# Patient Record
Sex: FEMALE | Race: WHITE | NOT HISPANIC OR LATINO | Employment: OTHER | URBAN - NONMETROPOLITAN AREA
[De-identification: names, ages, dates, MRNs, and addresses within clinical notes are randomized per-mention and may not be internally consistent; named-entity substitution may affect disease eponyms.]

---

## 2017-11-13 ENCOUNTER — TRANSCRIBE ORDERS (OUTPATIENT)
Dept: ADMINISTRATIVE | Facility: HOSPITAL | Age: 57
End: 2017-11-13

## 2017-11-13 DIAGNOSIS — E11.9 DIABETES MELLITUS WITHOUT COMPLICATION (HCC): Primary | ICD-10-CM

## 2021-02-25 DIAGNOSIS — Z23 IMMUNIZATION DUE: ICD-10-CM

## 2021-03-08 ENCOUNTER — IMMUNIZATION (OUTPATIENT)
Dept: VACCINE CLINIC | Facility: HOSPITAL | Age: 61
End: 2021-03-08

## 2021-03-08 PROCEDURE — 91300 HC SARSCOV02 VAC 30MCG/0.3ML IM: CPT | Performed by: INTERNAL MEDICINE

## 2021-03-08 PROCEDURE — 0001A: CPT | Performed by: INTERNAL MEDICINE

## 2021-03-29 ENCOUNTER — IMMUNIZATION (OUTPATIENT)
Dept: VACCINE CLINIC | Facility: HOSPITAL | Age: 61
End: 2021-03-29

## 2021-03-29 PROCEDURE — 0002A: CPT | Performed by: INTERNAL MEDICINE

## 2021-03-29 PROCEDURE — 91300 HC SARSCOV02 VAC 30MCG/0.3ML IM: CPT | Performed by: INTERNAL MEDICINE

## 2021-08-06 LAB — DIABETIC RETINOPATHY: NEGATIVE

## 2022-04-06 ENCOUNTER — OFFICE VISIT (OUTPATIENT)
Dept: INTERNAL MEDICINE CLINIC | Age: 62
End: 2022-04-06
Payer: MEDICAID

## 2022-04-06 VITALS
HEART RATE: 88 BPM | DIASTOLIC BLOOD PRESSURE: 64 MMHG | SYSTOLIC BLOOD PRESSURE: 132 MMHG | WEIGHT: 171.2 LBS | OXYGEN SATURATION: 98 % | BODY MASS INDEX: 27.51 KG/M2 | HEIGHT: 66 IN

## 2022-04-06 DIAGNOSIS — Z12.11 SCREEN FOR COLON CANCER: ICD-10-CM

## 2022-04-06 DIAGNOSIS — K75.81 NASH (NONALCOHOLIC STEATOHEPATITIS): ICD-10-CM

## 2022-04-06 DIAGNOSIS — E78.49 OTHER HYPERLIPIDEMIA: ICD-10-CM

## 2022-04-06 DIAGNOSIS — Z76.89 ENCOUNTER TO ESTABLISH CARE: Primary | ICD-10-CM

## 2022-04-06 DIAGNOSIS — I10 ESSENTIAL HYPERTENSION: ICD-10-CM

## 2022-04-06 DIAGNOSIS — G25.81 RESTLESS LEG SYNDROME: ICD-10-CM

## 2022-04-06 DIAGNOSIS — I25.9 CHRONIC ISCHEMIC HEART DISEASE: ICD-10-CM

## 2022-04-06 DIAGNOSIS — F41.9 ANXIETY AND DEPRESSION: ICD-10-CM

## 2022-04-06 DIAGNOSIS — K21.9 GASTROESOPHAGEAL REFLUX DISEASE WITHOUT ESOPHAGITIS: ICD-10-CM

## 2022-04-06 DIAGNOSIS — E11.42 DIABETIC POLYNEUROPATHY ASSOCIATED WITH TYPE 2 DIABETES MELLITUS (HCC): ICD-10-CM

## 2022-04-06 DIAGNOSIS — F32.A ANXIETY AND DEPRESSION: ICD-10-CM

## 2022-04-06 DIAGNOSIS — E21.3 HYPERPARATHYROIDISM (HCC): ICD-10-CM

## 2022-04-06 DIAGNOSIS — E11.8 TYPE 2 DIABETES MELLITUS WITH COMPLICATIONS (HCC): ICD-10-CM

## 2022-04-06 DIAGNOSIS — C95.11 CHRONIC LEUKEMIA IN REMISSION (HCC): ICD-10-CM

## 2022-04-06 DIAGNOSIS — Z11.59 SCREENING FOR VIRAL DISEASE: ICD-10-CM

## 2022-04-06 DIAGNOSIS — E03.8 OTHER SPECIFIED HYPOTHYROIDISM: ICD-10-CM

## 2022-04-06 DIAGNOSIS — G47.30 SLEEP APNEA, UNSPECIFIED TYPE: ICD-10-CM

## 2022-04-06 DIAGNOSIS — E53.8 B12 DEFICIENCY: ICD-10-CM

## 2022-04-06 DIAGNOSIS — Z12.31 ENCOUNTER FOR SCREENING MAMMOGRAM FOR MALIGNANT NEOPLASM OF BREAST: ICD-10-CM

## 2022-04-06 DIAGNOSIS — J45.30 MILD PERSISTENT ASTHMA WITHOUT COMPLICATION: ICD-10-CM

## 2022-04-06 PROBLEM — J45.20 MILD INTERMITTENT ASTHMA WITHOUT COMPLICATION: Status: ACTIVE | Noted: 2022-04-06

## 2022-04-06 PROBLEM — R26.89 BALANCE DISORDER: Status: ACTIVE | Noted: 2022-04-06

## 2022-04-06 PROBLEM — E03.9 HYPOTHYROIDISM: Status: ACTIVE | Noted: 2022-04-06

## 2022-04-06 PROBLEM — C95.90 LEUKEMIA (HCC): Status: ACTIVE | Noted: 2022-04-06

## 2022-04-06 PROCEDURE — 1036F TOBACCO NON-USER: CPT | Performed by: INTERNAL MEDICINE

## 2022-04-06 PROCEDURE — G8427 DOCREV CUR MEDS BY ELIG CLIN: HCPCS | Performed by: INTERNAL MEDICINE

## 2022-04-06 PROCEDURE — 3046F HEMOGLOBIN A1C LEVEL >9.0%: CPT | Performed by: INTERNAL MEDICINE

## 2022-04-06 PROCEDURE — G8419 CALC BMI OUT NRM PARAM NOF/U: HCPCS | Performed by: INTERNAL MEDICINE

## 2022-04-06 PROCEDURE — 3017F COLORECTAL CA SCREEN DOC REV: CPT | Performed by: INTERNAL MEDICINE

## 2022-04-06 PROCEDURE — 2022F DILAT RTA XM EVC RTNOPTHY: CPT | Performed by: INTERNAL MEDICINE

## 2022-04-06 PROCEDURE — 99205 OFFICE O/P NEW HI 60 MIN: CPT | Performed by: INTERNAL MEDICINE

## 2022-04-06 RX ORDER — LEVOTHYROXINE SODIUM 0.05 MG/1
TABLET ORAL
Qty: 90 TABLET | Refills: 1 | Status: SHIPPED | OUTPATIENT
Start: 2022-04-06 | End: 2022-06-17 | Stop reason: SDUPTHER

## 2022-04-06 RX ORDER — B-COMPLEX WITH VITAMIN C
1 TABLET ORAL DAILY
COMMUNITY

## 2022-04-06 RX ORDER — INSULIN LISPRO 100 [IU]/ML
INJECTION, SOLUTION INTRAVENOUS; SUBCUTANEOUS
Qty: 10 PEN | Refills: 1 | Status: SHIPPED
Start: 2022-04-06 | End: 2022-04-06

## 2022-04-06 RX ORDER — INSULIN LISPRO 100 [IU]/ML
INJECTION, SOLUTION INTRAVENOUS; SUBCUTANEOUS
COMMUNITY
Start: 2021-08-31 | End: 2022-04-06

## 2022-04-06 RX ORDER — LEVOTHYROXINE SODIUM 0.05 MG/1
TABLET ORAL
COMMUNITY
Start: 2021-12-22 | End: 2022-04-06 | Stop reason: SDUPTHER

## 2022-04-06 RX ORDER — METHOCARBAMOL 500 MG/1
500 TABLET, FILM COATED ORAL 3 TIMES DAILY PRN
COMMUNITY
Start: 2021-11-17 | End: 2022-09-07

## 2022-04-06 RX ORDER — NORTRIPTYLINE HYDROCHLORIDE 25 MG/1
25 CAPSULE ORAL NIGHTLY
COMMUNITY
Start: 2022-01-19 | End: 2022-04-06

## 2022-04-06 RX ORDER — EMPAGLIFLOZIN 25 MG/1
25 TABLET, FILM COATED ORAL DAILY
COMMUNITY
Start: 2022-02-15 | End: 2022-04-06

## 2022-04-06 RX ORDER — PANTOPRAZOLE SODIUM 40 MG/1
40 TABLET, DELAYED RELEASE ORAL DAILY
Qty: 90 TABLET | Refills: 1 | Status: SHIPPED | OUTPATIENT
Start: 2022-04-06 | End: 2022-07-05

## 2022-04-06 RX ORDER — ASPIRIN 81 MG/1
81 TABLET ORAL DAILY
COMMUNITY

## 2022-04-06 RX ORDER — ALBUTEROL SULFATE 90 UG/1
2 AEROSOL, METERED RESPIRATORY (INHALATION) EVERY 6 HOURS PRN
COMMUNITY
End: 2022-04-06 | Stop reason: SDUPTHER

## 2022-04-06 RX ORDER — DULOXETIN HYDROCHLORIDE 60 MG/1
60 CAPSULE, DELAYED RELEASE ORAL DAILY
Qty: 90 CAPSULE | Refills: 1 | Status: SHIPPED | OUTPATIENT
Start: 2022-04-06 | End: 2022-07-06

## 2022-04-06 RX ORDER — ISOSORBIDE MONONITRATE 30 MG/1
15 TABLET, EXTENDED RELEASE ORAL DAILY
COMMUNITY
Start: 2021-10-27 | End: 2022-04-06 | Stop reason: SDUPTHER

## 2022-04-06 RX ORDER — ATORVASTATIN CALCIUM 40 MG/1
40 TABLET, FILM COATED ORAL DAILY
COMMUNITY
Start: 2022-03-15 | End: 2022-04-06 | Stop reason: SDUPTHER

## 2022-04-06 RX ORDER — BLOOD-GLUCOSE METER
KIT MISCELLANEOUS
COMMUNITY
End: 2022-04-27 | Stop reason: SDUPTHER

## 2022-04-06 RX ORDER — CLOPIDOGREL BISULFATE 75 MG/1
75 TABLET ORAL DAILY
Qty: 90 TABLET | Refills: 1 | Status: SHIPPED | OUTPATIENT
Start: 2022-04-06 | End: 2022-05-05 | Stop reason: ALTCHOICE

## 2022-04-06 RX ORDER — CLOPIDOGREL BISULFATE 75 MG/1
75 TABLET ORAL DAILY
COMMUNITY
Start: 2021-12-22 | End: 2022-04-06 | Stop reason: SDUPTHER

## 2022-04-06 RX ORDER — INSULIN GLARGINE 100 [IU]/ML
35 INJECTION, SOLUTION SUBCUTANEOUS NIGHTLY
Qty: 5 PEN | Refills: 0
Start: 2022-04-06 | End: 2022-04-07

## 2022-04-06 RX ORDER — ALBUTEROL SULFATE 90 UG/1
2 AEROSOL, METERED RESPIRATORY (INHALATION) EVERY 6 HOURS PRN
Qty: 18 G | Refills: 5 | Status: SHIPPED | OUTPATIENT
Start: 2022-04-06

## 2022-04-06 RX ORDER — ISOSORBIDE MONONITRATE 30 MG/1
15 TABLET, EXTENDED RELEASE ORAL DAILY
Qty: 45 TABLET | Refills: 1 | Status: SHIPPED | OUTPATIENT
Start: 2022-04-06 | End: 2022-04-11

## 2022-04-06 RX ORDER — INSULIN LISPRO 100 [IU]/ML
INJECTION, SOLUTION INTRAVENOUS; SUBCUTANEOUS
Qty: 10 PEN | Refills: 1
Start: 2022-04-06

## 2022-04-06 RX ORDER — INSULIN GLARGINE 100 [IU]/ML
35 INJECTION, SOLUTION SUBCUTANEOUS
COMMUNITY
Start: 2022-01-31 | End: 2022-04-06 | Stop reason: SDUPTHER

## 2022-04-06 RX ORDER — GABAPENTIN 800 MG/1
800 TABLET ORAL 2 TIMES DAILY PRN
COMMUNITY
Start: 2022-01-19 | End: 2022-04-06 | Stop reason: SDUPTHER

## 2022-04-06 RX ORDER — ATORVASTATIN CALCIUM 40 MG/1
40 TABLET, FILM COATED ORAL DAILY
Qty: 90 TABLET | Refills: 1 | Status: SHIPPED | OUTPATIENT
Start: 2022-04-06 | End: 2022-07-05

## 2022-04-06 RX ORDER — MULTIVIT WITH MINERALS/LUTEIN
500 TABLET ORAL DAILY
COMMUNITY

## 2022-04-06 RX ORDER — CETIRIZINE HYDROCHLORIDE 10 MG/1
10 TABLET ORAL DAILY
COMMUNITY
Start: 2022-04-06

## 2022-04-06 RX ORDER — DULOXETIN HYDROCHLORIDE 60 MG/1
60 CAPSULE, DELAYED RELEASE ORAL DAILY
COMMUNITY
Start: 2022-02-01 | End: 2022-04-06 | Stop reason: SDUPTHER

## 2022-04-06 RX ORDER — GABAPENTIN 800 MG/1
800 TABLET ORAL 2 TIMES DAILY
Qty: 180 TABLET | Refills: 1 | Status: SHIPPED | OUTPATIENT
Start: 2022-04-06 | End: 2022-07-05

## 2022-04-06 RX ORDER — PANTOPRAZOLE SODIUM 40 MG/1
40 TABLET, DELAYED RELEASE ORAL DAILY
COMMUNITY
Start: 2021-11-16 | End: 2022-04-06 | Stop reason: SDUPTHER

## 2022-04-06 SDOH — ECONOMIC STABILITY: FOOD INSECURITY: WITHIN THE PAST 12 MONTHS, YOU WORRIED THAT YOUR FOOD WOULD RUN OUT BEFORE YOU GOT MONEY TO BUY MORE.: NEVER TRUE

## 2022-04-06 SDOH — ECONOMIC STABILITY: FOOD INSECURITY: WITHIN THE PAST 12 MONTHS, THE FOOD YOU BOUGHT JUST DIDN'T LAST AND YOU DIDN'T HAVE MONEY TO GET MORE.: NEVER TRUE

## 2022-04-06 ASSESSMENT — ENCOUNTER SYMPTOMS
ABDOMINAL PAIN: 1
VOICE CHANGE: 1
CONSTIPATION: 1
COUGH: 1
TROUBLE SWALLOWING: 1
WHEEZING: 1
SORE THROAT: 1
ROS SKIN COMMENTS: ITCHING
DIARRHEA: 1
SHORTNESS OF BREATH: 1

## 2022-04-06 ASSESSMENT — PATIENT HEALTH QUESTIONNAIRE - PHQ9
DEPRESSION UNABLE TO ASSESS: PT REFUSES
8. MOVING OR SPEAKING SO SLOWLY THAT OTHER PEOPLE COULD HAVE NOTICED. OR THE OPPOSITE, BEING SO FIGETY OR RESTLESS THAT YOU HAVE BEEN MOVING AROUND A LOT MORE THAN USUAL: 0
10. IF YOU CHECKED OFF ANY PROBLEMS, HOW DIFFICULT HAVE THESE PROBLEMS MADE IT FOR YOU TO DO YOUR WORK, TAKE CARE OF THINGS AT HOME, OR GET ALONG WITH OTHER PEOPLE: 1
7. TROUBLE CONCENTRATING ON THINGS, SUCH AS READING THE NEWSPAPER OR WATCHING TELEVISION: 3
SUM OF ALL RESPONSES TO PHQ QUESTIONS 1-9: 14
9. THOUGHTS THAT YOU WOULD BE BETTER OFF DEAD, OR OF HURTING YOURSELF: 0
1. LITTLE INTEREST OR PLEASURE IN DOING THINGS: 3
4. FEELING TIRED OR HAVING LITTLE ENERGY: 3
SUM OF ALL RESPONSES TO PHQ QUESTIONS 1-9: 14
3. TROUBLE FALLING OR STAYING ASLEEP: 3
2. FEELING DOWN, DEPRESSED OR HOPELESS: 1
SUM OF ALL RESPONSES TO PHQ QUESTIONS 1-9: 14
SUM OF ALL RESPONSES TO PHQ QUESTIONS 1-9: 14
6. FEELING BAD ABOUT YOURSELF - OR THAT YOU ARE A FAILURE OR HAVE LET YOURSELF OR YOUR FAMILY DOWN: 0
SUM OF ALL RESPONSES TO PHQ9 QUESTIONS 1 & 2: 4
5. POOR APPETITE OR OVEREATING: 1

## 2022-04-06 ASSESSMENT — SOCIAL DETERMINANTS OF HEALTH (SDOH): HOW HARD IS IT FOR YOU TO PAY FOR THE VERY BASICS LIKE FOOD, HOUSING, MEDICAL CARE, AND HEATING?: NOT HARD AT ALL

## 2022-04-06 NOTE — PROGRESS NOTES
Osman Mitchell (:  1960) is a 60 y.o. female, here for evaluation of the following chief complaint(s):    New Patient      ASSESSMENT/PLAN:  1. Encounter to establish care  Chart extensively reviewed and updated  2. Chronic ischemic heart disease  -     Referral to: Francy Saavedra MD, Cardiology, [facility]  -     clopidogrel (PLAVIX) 75 MG tablet; Take 1 tablet by mouth daily, Disp-90 tablet, R-1Normal  -     isosorbide mononitrate (IMDUR) 30 MG extended release tablet; Take 0.5 tablets by mouth daily, Disp-45 tablet, R-1Normal    3. CORONA (nonalcoholic steatohepatitis)  -     [facility]  4. Gastroesophageal reflux disease without esophagitis  -     pantoprazole (PROTONIX) 40 MG tablet; Take 1 tablet by mouth daily, Disp-90 tablet, R-1Normal  -     Magnesium; Future  -     [facility]  5. Type 2 diabetes mellitus with complications [facility])  -     Lynne Vences MD, Endocrinology, [facility]  -     insulin glargine (LANTUS SOLOSTAR) 100 UNIT/ML injection pen; Inject 35 Units into the skin nightly, Disp-5 pen, R-0Adjust Sig  -     Hemoglobin A1C; Future  -     insulin lispro, 1 Unit Dial, (HUMALOG KWIKPEN) 100 UNIT/ML SOPN; Use up  To 35 units daily per sliding scale, Disp-10 pen, R-1NO PRINT  -Metformin  6. Other specified hypothyroidism  -     levothyroxine (SYNTHROID) 50 MCG tablet; TAKE ONE TABLET BY MOUTH ON MONDAY THROUGH FRIDAY AND 2 TABLETS BY MOUTH ON SATURDAY AND , Disp-90 tablet, R-1Normal  -     TSH; Future  -     T4, Free; Future  7. Diabetic polyneuropathy associated with type 2 diabetes mellitus (HCC)   Stable on gabapentin  8. Other hyperlipidemia  -     atorvastatin (LIPITOR) 40 MG tablet; Take 1 tablet by mouth daily, Disp-90 tablet, R-1Normal    9. Chronic leukemia in remission ([address]  -     CBC; Future  10. B12 deficiency  -     Vitamin B12; Future  11.  Anxiety and depression   Patient feels her symptoms are not well controlled. Referral to Dr. Mari Hernandez  -     DULoxetine (CYMBALTA) 60 MG extended release capsule; Take 1 capsule by mouth daily, Disp-90 capsule, R-1Normal  12. Mild persistent asthma without complication  -     albuterol sulfate  (90 Base) MCG/ACT inhaler; Inhale 2 puffs into the lungs every 6 hours as needed for Wheezing, Disp-18 g, R-5Normal  -     cetirizine (ZYRTEC ALLERGY) 10 MG tablet; Take 1 tablet by mouth dailyOTC  13. Essential hypertension  Reasonable control on metoprolol. Consider ACE or ARB in the future  -     metoprolol tartrate (LOPRESSOR) 25 MG tablet; Take 0.5 tablets by mouth 2 times daily, Disp-90 tablet, R-1Normal  -     Comprehensive Metabolic Panel; Future  14. Hyperparathyroidism ([facility]  -     PTH, Intact; Future  -     Vitamin D 25 Hydroxy; Future  15. Restless leg syndrome  May relate to her SNRI. Continue gabapentin  -     Iron and TIBC; Future  16. Encounter for screening mammogram for malignant neoplasm of breast  -     [facility] DIGITAL SCREEN W OR WO CAD BILATERAL; Future  17. Screening for viral disease  -     Hepatitis C Antibody; Future  -     HIV Screen; Future  18. Screen for colon cancer  -     AFL - Rachel Keys MD, Gastroenterology, [place]  19. Sleep apnea, unspecified type  -     [facility] Sleep Medicine      Return in about 4 weeks (around 5/4/2022). SUBJECTIVE/OBJECTIVE:  HPI     Patient is here to establish care. Chart is reviewed and updated. She feels her antidepressants are not working well enough for her. 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 She states that she has had neuropsychology evaluations at [facility]. She is interested in seeing a psychiatrist to further assess. Regarding her diabetes, she has been off Jardiance for few weeks due to cost.  She was seeing an endocrinologist at [facility].   She would like a referral in the [facility] system. She states they were monitoring her parathyroid. She states that she needs a referral to have her CPAP or BiPAP checked. Review of Systems   Constitutional: Positive for appetite change, fatigue and unexpected weight change. Snoring   HENT: Positive for hearing loss, sore throat, tinnitus, trouble swallowing and voice change. Eyes: Positive for visual disturbance. Respiratory: Positive for cough, shortness of breath and wheezing. Cardiovascular: Positive for chest pain, palpitations and leg swelling. Gastrointestinal: Positive for abdominal pain, constipation and diarrhea. Genitourinary: Positive for frequency. Musculoskeletal: Positive for arthralgias and myalgias. Skin:        itching   Neurological: Positive for headaches. Psychiatric/Behavioral: Positive for decreased concentration, dysphoric mood and sleep disturbance. Past Medical History:   Diagnosis Date    Anxiety and depression     AV block, 1st degree     B12 deficiency     Balance disorder     frequent falls    Chronic ischemic heart disease     sp stents-was at [facility]-dr davalos?     Diabetic polyneuropathy associated with type 2 diabetes mellitus ([address]     Essential (primary) hypertension     GERD (gastroesophageal reflux disease)     Hyperparathyroidism ([address]     Hypothyroidism     Leukemia ([address]     acute lymphoblastic leukemia- [facility]    Low back pain     Mild intermittent asthma without complication     cold induced    Muscle spasms of neck     CORONA (nonalcoholic steatohepatitis)     saw dr Dinah Cooks    Osteoarthritis     hands knees, hips    Osteopenia     Other hyperlipidemia     PSVT (paroxysmal supraventricular tachycardia) (HCC)     Restless leg syndrome     Sleep apnea     Subarachnoid hemorrhage ([address] 2003    Type 2 diabetes mellitus with complications (HCC)        Current Outpatient Medications   Medication Sig Dispense Refill    Ascorbic Acid (VITAMIN C) 1000 MG tablet       aspirin 81 MG EC tablet Take 81 mg by mouth daily      Calcium Carbonate-Vitamin D (OYSTER SHELL CALCIUM/D) 500-200 MG-UNIT TABS Take 1 tablet by mouth 2 times daily (with meals)      Cholecalciferol (VITAMIN D3) 125 MCG (5000 UT) CAPS       cyanocobalamin 250 MCG tablet Take 250 mcg by mouth daily      blood glucose test strips (FREESTYLE LITE) strip       Insulin Pen Needle 32G X 5 MM MISC       Melatonin 10 MG SUBL       methocarbamol (ROBAXIN) 500 MG tablet Take 500 mg by mouth 3 times daily as needed      albuterol sulfate  (90 Base) MCG/ACT inhaler Inhale 2 puffs into the lungs every 6 hours as needed for Wheezing 18 g 5    atorvastatin (LIPITOR) 40 MG tablet Take 1 tablet by mouth daily 90 tablet 1    clopidogrel (PLAVIX) 75 MG tablet Take 1 tablet by mouth daily 90 tablet 1    DULoxetine (CYMBALTA) 60 MG extended release capsule Take 1 capsule by mouth daily 90 capsule 1    gabapentin (NEURONTIN) 800 MG tablet Take 1 tablet by mouth 2 times daily for 90 days.  180 tablet 1    insulin glargine (LANTUS SOLOSTAR) 100 UNIT/ML injection pen Inject 35 Units into the skin nightly 5 pen 0    isosorbide mononitrate (IMDUR) 30 MG extended release tablet Take 0.5 tablets by mouth daily 45 tablet 1    levothyroxine (SYNTHROID) 50 MCG tablet TAKE ONE TABLET BY MOUTH ON MONDAY THROUGH FRIDAY AND 2 TABLETS BY MOUTH ON SATURDAY AND SUNDAY 90 tablet 1    metFORMIN (GLUCOPHAGE) 500 MG tablet Take 2 tablets by mouth 2 times daily 360 tablet 1    metoprolol tartrate (LOPRESSOR) 25 MG tablet Take 0.5 tablets by mouth 2 times daily 90 tablet 1    pantoprazole (PROTONIX) 40 MG tablet Take 1 tablet by mouth daily 90 tablet 1    insulin lispro, 1 Unit Dial, (HUMALOG KWIKPEN) 100 UNIT/ML SOPN Use up  To 35 units daily per sliding scale 10 pen 1    cetirizine (ZYRTEC ALLERGY) 10 MG tablet Take 1 tablet by mouth daily       No current facility-administered medications for this visit.    tylenol, ibuprofen, occ benardyl for itchy dry skin    Physical Exam  Vitals and nursing note reviewed. Constitutional:       General: She is not in acute distress. Appearance: She is well-developed. HENT:      Head: Normocephalic and atraumatic. Right Ear: External ear normal.      Left Ear: External ear normal.   Eyes:      General: No scleral icterus. Extraocular Movements: Extraocular movements intact. Neck:      Thyroid: No thyromegaly. Cardiovascular:      Rate and Rhythm: Normal rate and regular rhythm. Heart sounds: No murmur heard. Pulmonary:      Effort: No respiratory distress. Breath sounds: Normal breath sounds. No wheezing or rales. Abdominal:      General: Bowel sounds are normal. There is no distension. Palpations: Abdomen is soft. Tenderness: There is no abdominal tenderness. Musculoskeletal:         General: Normal range of motion. Right lower leg: No edema. Left lower leg: No edema. Lymphadenopathy:      Cervical: No cervical adenopathy. Skin:     General: Skin is warm and dry. Neurological:      Mental Status: She is alert and oriented to person, place, and time. Cranial Nerves: No cranial nerve deficit. Sensory: No sensory deficit. Coordination: Coordination normal.   Psychiatric:         Behavior: Behavior normal.         On this date I have spent 65 minutes reviewing previous notes, test results and face to face with the patient discussing the diagnosis and importance of compliance with the treatment plan as well as documenting on the day of the visit. This note was generated completely or in part utilizing Dragon dictation speech recognition software. Occasionally, words are mistranscribed and despite editing, the text may contain inaccuracies due to incorrect word recognition.   If further clarification is needed please contact the office at [phone]          An electronic signature was used to

## 2022-04-07 RX ORDER — MAGNESIUM OXIDE 400 MG/1
400 TABLET ORAL DAILY
Qty: 90 TABLET | Refills: 1 | Status: ON HOLD | OUTPATIENT
Start: 2022-04-07 | End: 2022-05-27

## 2022-04-07 RX ORDER — INSULIN GLARGINE 100 [IU]/ML
38 INJECTION, SOLUTION SUBCUTANEOUS NIGHTLY
Qty: 5 PEN | Refills: 0
Start: 2022-04-07 | End: 2022-08-19

## 2022-04-10 DIAGNOSIS — I49.8 OTHER CARDIAC ARRHYTHMIA: Primary | ICD-10-CM

## 2022-04-11 ENCOUNTER — TELEPHONE (OUTPATIENT)
Dept: INTERNAL MEDICINE CLINIC | Age: 62
End: 2022-04-11

## 2022-04-11 DIAGNOSIS — I25.9 CHRONIC ISCHEMIC HEART DISEASE: ICD-10-CM

## 2022-04-11 RX ORDER — ISOSORBIDE MONONITRATE 30 MG/1
30 TABLET, EXTENDED RELEASE ORAL DAILY
Qty: 90 TABLET | Refills: 1
Start: 2022-04-11 | End: 2022-04-13 | Stop reason: SDUPTHER

## 2022-04-12 ENCOUNTER — OFFICE VISIT (OUTPATIENT)
Dept: SLEEP MEDICINE | Age: 62
End: 2022-04-12
Payer: MEDICAID

## 2022-04-12 VITALS
WEIGHT: 170 LBS | TEMPERATURE: 96.6 F | OXYGEN SATURATION: 98 % | BODY MASS INDEX: 27.32 KG/M2 | SYSTOLIC BLOOD PRESSURE: 110 MMHG | DIASTOLIC BLOOD PRESSURE: 60 MMHG | HEIGHT: 66 IN | HEART RATE: 73 BPM | RESPIRATION RATE: 18 BRPM

## 2022-04-12 DIAGNOSIS — R53.83 FATIGUE, UNSPECIFIED TYPE: ICD-10-CM

## 2022-04-12 DIAGNOSIS — G47.33 OSA (OBSTRUCTIVE SLEEP APNEA): Primary | ICD-10-CM

## 2022-04-12 DIAGNOSIS — R06.83 SNORING: ICD-10-CM

## 2022-04-12 DIAGNOSIS — Z86.79 H/O ISCHEMIC HEART DISEASE: ICD-10-CM

## 2022-04-12 DIAGNOSIS — G47.19 EXCESSIVE DAYTIME SLEEPINESS: ICD-10-CM

## 2022-04-12 DIAGNOSIS — R06.89 GASPING FOR BREATH: ICD-10-CM

## 2022-04-12 DIAGNOSIS — G47.30 OBSERVED SLEEP APNEA: ICD-10-CM

## 2022-04-12 PROCEDURE — 1036F TOBACCO NON-USER: CPT | Performed by: PSYCHIATRY & NEUROLOGY

## 2022-04-12 PROCEDURE — G8427 DOCREV CUR MEDS BY ELIG CLIN: HCPCS | Performed by: PSYCHIATRY & NEUROLOGY

## 2022-04-12 PROCEDURE — 3017F COLORECTAL CA SCREEN DOC REV: CPT | Performed by: PSYCHIATRY & NEUROLOGY

## 2022-04-12 PROCEDURE — 99204 OFFICE O/P NEW MOD 45 MIN: CPT | Performed by: PSYCHIATRY & NEUROLOGY

## 2022-04-12 PROCEDURE — G8419 CALC BMI OUT NRM PARAM NOF/U: HCPCS | Performed by: PSYCHIATRY & NEUROLOGY

## 2022-04-12 ASSESSMENT — SLEEP AND FATIGUE QUESTIONNAIRES
ESS TOTAL SCORE: 8
HOW LIKELY ARE YOU TO NOD OFF OR FALL ASLEEP WHILE SITTING AND READING: 2
HOW LIKELY ARE YOU TO NOD OFF OR FALL ASLEEP WHILE WATCHING TV: 2
NECK CIRCUMFERENCE (INCHES): 14.5
HOW LIKELY ARE YOU TO NOD OFF OR FALL ASLEEP WHILE SITTING AND TALKING TO SOMEONE: 0
HOW LIKELY ARE YOU TO NOD OFF OR FALL ASLEEP WHILE SITTING INACTIVE IN A PUBLIC PLACE: 1
HOW LIKELY ARE YOU TO NOD OFF OR FALL ASLEEP WHILE SITTING QUIETLY AFTER LUNCH WITHOUT ALCOHOL: 1
HOW LIKELY ARE YOU TO NOD OFF OR FALL ASLEEP WHEN YOU ARE A PASSENGER IN A CAR FOR AN HOUR WITHOUT A BREAK: 1
HOW LIKELY ARE YOU TO NOD OFF OR FALL ASLEEP IN A CAR, WHILE STOPPED FOR A FEW MINUTES IN TRAFFIC: 0
HOW LIKELY ARE YOU TO NOD OFF OR FALL ASLEEP WHILE LYING DOWN TO REST IN THE AFTERNOON WHEN CIRCUMSTANCES PERMIT: 1

## 2022-04-12 ASSESSMENT — ENCOUNTER SYMPTOMS
CHOKING: 1
GASTROINTESTINAL NEGATIVE: 1
SHORTNESS OF BREATH: 1
WHEEZING: 1
APNEA: 1
SORE THROAT: 1

## 2022-04-12 NOTE — PROGRESS NOTES
MD BARBARA Benavides Board Certified in Sleep Medicine  Certified [facility] Sleep Medicine  Board Certified in Neurology [address]  [address] [place] ([address]  [address], [address]           [address]  [facility] SLEEP MEDICINE [address] [address] [phone]    Subjective:     Patient ID: Shanice Sales is a 60 y.o. female. Chief Complaint   Patient presents with    [facility]    Sleep Apnea       HPI:        Shanice Sales is a 60 y.o. female referred by Dr. Funmilayo Millan for a sleep evaluation. She complains of snoring, snorting, choking, periods of not breathing, tossing and turning, decreased concentration, excessive daytime sleepiness, feels sleepy during the day but she denies knees buckling with laughing, completely or partially paralyzed while falling asleep or waking up, noisy environment, uncomfortable room temperature, uncomfortable bedding. Symptoms began several years ago, gradually worsening since that time. The patient's bed-partner confirmed the snoring and stopped breathing at night  SLEEP SCHEDULE: Goes to bed around 11 PM-12 AM in the weekdays and 11 PM-12 AM in the weekends. It usually takes the patient 120 minutes to fall asleep. The patient gets up 1-2 per night to go to the bathroom. The Patient finally gets up at 9-10 AM during the weekdays and 9-10 AM in the weekends. patient wakes up with dry mouth and sometimes morning headache. . the headache usually dull headache. .   The patient has restless sleep with frequent arousals in addition to the Patient has significant daytime sleepiness. The Patient scored Total score: 8 on Jasper Sleepiness Scale ( more than 10 is indicative of daytime sleepiness)and 57 in fatigue scale ( more than 36 is indicative of daytime fatigue). The patient takes no naps. Previous evaluation and treatment has included- PSG and PSG with C PAP titration. 40 and 17 years ago. DOT/CDL - N/A  LIUDMILA/Erlin - N/A  The patient CAD is stable. Previous Report(s) Reviewed: historical medical records       Social History     Socioeconomic History    Marital status:      Spouse name: Not on file    Number of children: 2    Years of education: Not on file    Highest education level: Not on file   Occupational History    Occupation: retail work, retired nurse   Tobacco Use    Smoking status: Never Smoker    Smokeless tobacco: Never Used   Substance and Sexual Activity    Alcohol use: Never    Drug use: Never    Sexual activity: Not on file   Other Topics Concern    Not on file   Social History Narrative    Lives w daughter    Has 2 daughters 44 and 45 4/22     Social Determinants of Health     Financial Resource Strain: Low Risk     Difficulty of Paying Living Expenses: Not hard at all   Food Insecurity: No Food Insecurity    Worried About Running Out of Food in the Last Year: Never true    Bill of Food in the Last Year: Never true   Transportation Needs:     Lack of Transportation (Medical): Not on file    Lack of Transportation (Non-Medical):  Not on file   Physical Activity:     Days of Exercise per Week: Not on file    Minutes of Exercise per Session: Not on file   Stress:     Feeling of Stress : Not on file   Social Connections:     Frequency of Communication with Friends and Family: Not on file    Frequency of Social Gatherings with Friends and Family: Not on file    Attends [employer] Services: Not on file    Active Member of Clubs or Organizations: Not on file    Attends Club or Organization Meetings: Not on file    Marital Status: Not on file   Intimate Partner Violence:     Fear of Current or Ex-Partner: Not on file    Emotionally Abused: Not on file    Physically Abused: Not on file    Sexually Abused: Not on file   Housing Stability:     Unable to Pay for Housing in the Last Year: Not on file    Number of Places Lived in the Last Year: Not on file    Unstable Housing in the Last Year: Not on file       Prior to Admission medications    Medication Sig Start Date End Date Taking?  Authorizing Provider   isosorbide mononitrate (IMDUR) 30 MG extended release tablet Take 1 tablet by mouth daily 4/11/22  Yes Ru Pryor MD   magnesium oxide (MAG-OX) 400 MG tablet Take 1 tablet by mouth daily 4/7/22  Yes Ru Pryor MD   insulin glargine (LANTUS SOLOSTAR) 100 UNIT/ML injection pen Inject 38 Units into the skin nightly 4/7/22  Yes Ana Luisa Falcon MD   Ascorbic Acid (VITAMIN C) 1000 MG tablet    Yes Historical Provider, MD   aspirin 81 MG EC tablet Take 81 mg by mouth daily   Yes Historical Provider, MD   Calcium Carbonate-Vitamin D (OYSTER SHELL CALCIUM/D) 500-200 MG-UNIT TABS Take 1 tablet by mouth 2 times daily (with meals)   Yes Historical Provider, MD   Cholecalciferol (VITAMIN D3) 125 MCG (5000 UT) CAPS    Yes Historical Provider, MD   cyanocobalamin 250 MCG tablet Take 250 mcg by mouth daily   Yes Historical Provider, MD   blood glucose test strips (FREESTYLE LITE) strip    Yes Historical Provider, MD   Insulin Pen Needle 32G X 5 MM MISC    Yes Historical Provider, MD   Melatonin 10 MG SUBL    Yes Historical Provider, MD   methocarbamol (ROBAXIN) 500 MG tablet Take 500 mg by mouth 3 times daily as needed 11/17/21  Yes Historical Provider, MD   albuterol sulfate  (90 Base) MCG/ACT inhaler Inhale 2 puffs into the lungs every 6 hours as needed for Wheezing 4/6/22  Yes Ru Pryor MD   atorvastatin (LIPITOR) 40 MG tablet Take 1 tablet by mouth daily 4/6/22  Yes Ru Pryor MD   clopidogrel (PLAVIX) 75 MG tablet Take 1 tablet by mouth daily 4/6/22  Yes Ru Pryor MD   DULoxetine (CYMBALTA) 60 MG extended release capsule Take 1 capsule by mouth daily 4/6/22  Yes Meena Whitten MD   gabapentin (NEURONTIN) 800 MG tablet Take 1 tablet by mouth 2 times daily for 90 days.  4/6/22 7/5/22 Yes Ana Luisa Seymour MD   levothyroxine (SYNTHROID) 50 MCG tablet TAKE ONE TABLET BY MOUTH ON MONDAY THROUGH FRIDAY AND 2 TABLETS BY MOUTH ON SATURDAY AND SUNDAY 4/6/22  Yes Meena Whitten MD   metFORMIN (GLUCOPHAGE) 500 MG tablet Take 2 tablets by mouth 2 times daily 4/6/22  Yes Meena Whitten MD   metoprolol tartrate (LOPRESSOR) 25 MG tablet Take 0.5 tablets by mouth 2 times daily 4/6/22  Yes Meena Whitten MD   pantoprazole (PROTONIX) 40 MG tablet Take 1 tablet by mouth daily 4/6/22  Yes Meena Whitten MD   insulin lispro, 1 Unit Dial, (HUMALOG KWIKPEN) 100 UNIT/ML SOPN Use up  To 35 units daily per sliding scale 4/6/22  Yes Meena Whitten MD   cetirizine (ZYRTEC ALLERGY) 10 MG tablet Take 1 tablet by mouth daily 4/6/22  Yes Meena Whitten MD       Allergies as of 04/12/2022 - Fully Reviewed 04/12/2022   Allergen Reaction Noted    Nitrofurantoin Anaphylaxis, Hives, Itching, Shortness Of Breath, and Swelling 05/08/2020    Cisapride Diarrhea 06/25/2014    Fexofenadine-pseudoephed er Palpitations 06/25/2014    Metoclopramide Diarrhea and Hives 06/25/2014    Codeine Dizziness or Vertigo and Other (See Comments) 06/25/2014    Iodides Other (See Comments) 06/25/2014    Oxycodone-acetaminophen Other (See Comments) 06/25/2014    Sulfamethoxazole-trimethoprim Hives and Other (See Comments) 04/09/2018    Ciprofloxacin Hives 06/25/2014    Clarithromycin Hives 06/25/2014       Patient Active Problem List   Diagnosis    Anxiety and depression    B12 deficiency    Chronic ischemic heart disease    Diabetic polyneuropathy associated with type 2 diabetes mellitus ([facility]    GERD (gastroesophageal reflux disease)    Leukemia ([facility]    Hypothyroidism    CORONA (nonalcoholic steatohepatitis)    Other hyperlipidemia    Type 2 diabetes mellitus with complications (HCC)    Restless leg syndrome    Mild intermittent asthma without complication    Balance disorder       Past Medical History:   Diagnosis Date    Anxiety and depression     AV block, 1st degree     B12 deficiency     Balance disorder     frequent falls    Chronic ischemic heart disease     sp stents-[facility]dr davalos?  Diabetic polyneuropathy associated with type 2 diabetes mellitus ([address]     Essential (primary) hypertension     GERD (gastroesophageal reflux disease)     Hyperparathyroidism ([address]     Hypothyroidism     Leukemia ([address]     [facility]    Low back pain     Mild intermittent asthma without complication     cold induced    Muscle spasms of neck     CORONA (nonalcoholic steatohepatitis)     saw dr Paige Jeffrey    Osteoarthritis     hands knees, hips    Osteopenia 2021    dexa    Other hyperlipidemia     PSVT (paroxysmal supraventricular tachycardia) ([address]     Restless leg syndrome     Sleep apnea     Subarachnoid hemorrhage ([address] 2003    Type 2 diabetes mellitus with complications ([facility]        Past Surgical History:   Procedure Laterality Date    ABDOMINAL EXPLORATION SURGERY      CHOLECYSTECTOMY, LAPAROSCOPIC      LIVER BIOPSY      OTHER SURGICAL HISTORY Right     radial head fx    MALLORY AND BSO      2 surgeries, 1985, 2001    UPPER GASTROINTESTINAL ENDOSCOPY      ? Family History   Problem Relation Age of Onset    Asthma Mother     Heart Failure Mother     Heart Disease Father     Lung Cancer Father     Other Brother         factor 5 leiden       Review of Systems   Constitutional: Positive for unexpected weight change. HENT: Positive for congestion and sore throat. Respiratory: Positive for apnea, choking, shortness of breath and wheezing. Cardiovascular: Positive for leg swelling. Gastrointestinal: Negative.     Endocrine: Positive for cold intolerance and heat intolerance. Genitourinary: Positive for frequency. Musculoskeletal: Positive for arthralgias and myalgias. Skin: Negative. Neurological: Positive for headaches. Psychiatric/Behavioral: Positive for decreased concentration and dysphoric mood. The patient is nervous/anxious. Objective:     Vitals:  Weight BMI Neck circumference    Wt Readings from Last 3 Encounters:   04/12/22 170 lb (77.1 kg)   04/06/22 171 lb 3.2 oz (77.7 kg)    Body mass index is 27.27 kg/m². Neck circumference (Inches): 14.5     BP HR SaO2   BP Readings from Last 3 Encounters:   04/12/22 110/60   04/06/22 132/64    Pulse Readings from Last 3 Encounters:   04/12/22 73   04/06/22 88    SpO2 Readings from Last 3 Encounters:   04/12/22 98%   04/06/22 98%        The mandibular molar Class :   []1 []2 []3      Mallampati I Airway Classification:   []1 []2 []3 []4        Physical Exam  Vitals and nursing note reviewed. Constitutional:       Appearance: Normal appearance. HENT:      Head: Atraumatic. Nose: Nose normal.      Mouth/Throat:      Comments: Mallampati class 3, no retrognathia or hypognathia , normal airflow in bilateral nostrils, no septum deviation , crowded oropharynx with low soft palate, no tonsils enlargement. Eyes:      Extraocular Movements: Extraocular movements intact. Cardiovascular:      Rate and Rhythm: Normal rate and regular rhythm. Pulmonary:      Effort: Pulmonary effort is normal.      Breath sounds: Normal breath sounds. Musculoskeletal:      Cervical back: Normal range of motion and neck supple. Right lower leg: Edema (trace ) present. Left lower leg: Edema (trace) present. Neurological:      General: No focal deficit present. Psychiatric:         Mood and Affect: Mood normal.         Assessment:   Obstructive Sleep Apnea/Hypopnea Syndrome     Diagnosis Orders   1.  CHRIS (obstructive sleep apnea)  Baseline Diagnostic Sleep Study    Sleep Study with PAP Titration   2. H/O ischemic heart disease  Baseline Diagnostic Sleep Study   3. Gasping for breath  Baseline Diagnostic Sleep Study   4. Observed sleep apnea  Baseline Diagnostic Sleep Study   5. Snoring  Baseline Diagnostic Sleep Study   6. Fatigue, unspecified type  Baseline Diagnostic Sleep Study   7. Excessive daytime sleepiness  Baseline Diagnostic Sleep Study     Plan:     Patient was counseled about the pathophysiology of obstructive sleep apnea syndrome and the methods for evaluating its presence and severity. Patient was counseled to avoid driving and other potentially hazardous circumstances if the patient is experiencing excessive sleepiness. Treatment considerations include the use of nasal CPAP, oral dental appliance or a surgical intervention, which should be based on otolarygologic findings, In the meantime, the patient should be cautioned to avoid the use of alcohol or other depressant medications because of potential for increasing the duration and severity of apnea and cautioned regarding driving or operating and dangerous equipment if the patient is experiencing daytime sleepiness. .  Most likely treating the CHRIS will have positive impact on CAD control. Orders Placed This Encounter   Procedures    Baseline Diagnostic Sleep Study    Sleep Study with PAP Titration       Return for F/U between 31 and 90 days from the recieving CPAP.     Marlen Mcmahon MD  Medical Director - [facility]

## 2022-04-12 NOTE — TELEPHONE ENCOUNTER
States she used to take a whole one but would get dizziness & BP would drop. Thinks it was due to taking metoprolol as well.  She will remue the whole tablet and let us know if there are issues again

## 2022-04-12 NOTE — PATIENT INSTRUCTIONS
Orders Placed This Encounter   Procedures    Baseline Diagnostic Sleep Study     Standing Status:   Future     Standing Expiration Date:   4/12/2023     Order Specific Question:   Adult or Pediatric     Answer:   Adult Study (>7 Years)     Order Specific Question:   Location For Sleep Study     Answer:   [place]     Order Specific Question:   Select Sleep Lab Location     Answer:   [facility]    Sleep Study with PAP Titration     Standing Status:   Future     Standing Expiration Date:   4/12/2023     Order Specific Question:   Sleep Study Titration Type     Answer:   CPAP     Order Specific Question:   Location For Sleep Study     Answer:   [place]     Order Specific Question:   Select Sleep Lab Location     Answer:   [facility]        Patient Education        Sleep Apnea: Care Instructions  Overview     Sleep apnea means that you frequently stop breathing for 10 seconds or longer during sleep. It can be mild to severe, based on the number of times an hourthat you stop breathing. Blocked or narrowed airways in your nose, mouth, or throat can cause sleep apnea. Your airway can become blocked when your throat muscles and tongue relaxduring sleep. You can help treat sleep apnea at home by making lifestyle changes. You also can use a CPAP breathing machine that keeps tissues in the throat from blocking your airway. Or your doctor may suggest that you use a breathing device while you sleep. It helps keep your airway open. This could be a device that you put in your mouth. In some cases, surgery may be needed to remove enlarged tissuesin the throat. Follow-up care is a key part of your treatment and safety. Be sure to make and go to all appointments, and call your doctor if you are having problems. It's also a good idea to know your test results and keep alist of the medicines you take. How can you care for yourself at home?  Lose weight, if needed.  Sleep on your side.  It may help mild apnea.   Avoid alcohol and medicines such as sleeping pills, opioids, or sedatives before bed.  Don't smoke. If you need help quitting, talk to your doctor.  Prop up the head of your bed.  Treat breathing problems, such as a stuffy nose, that are caused by a cold or allergies.  Try a continuous positive airway pressure (CPAP) breathing machine if your doctor recommends it.  If CPAP doesn't work for you, ask your doctor if you can try other masks, settings, or breathing machines.  Try oral breathing devices or other nasal devices.  Talk to your doctor if your nose feels dry or bleeds, or if it gets runny or stuffy when you use a breathing machine.  Tell your doctor if you're sleepy during the day and it affects your daily life. Don't drive or operate machinery when you're drowsy. When should you call for help? Watch closely for changes in your health, and be sure to contact your doctor if:     You still have sleep apnea even though you have made lifestyle changes.      You are thinking of trying a device such as CPAP.      You are having problems using a CPAP or similar machine.      You are still sleepy during the day, and it affects your daily life. Where can you learn more? Go to https://[email or web address] Steward.[email or web address]. org and sign in to your [employer] account. Enter L588 in the [employer] box to learn more about \"Sleep Apnea: Care Instructions. \"     If you do not have an account, please click on the \"Sign Up Now\" link. Current as of: July 6, 2021               Content Version: 13.2  © 2006-2022 Healthwise, Incorporated. Care instructions adapted under license by [facility] ([facility]). If you have questions about a medical condition or this instruction, always ask your healthcare professional. [place] [address] any warranty or liability for your use of this information.

## 2022-04-12 NOTE — TELEPHONE ENCOUNTER
Tell patient that I got a note from her pharmacy it is not recommended for her to continue to split her isosorbide. Tell her to take a whole isosorbide 30mg tablet. Let me know if she gets side effects such as headache or dizziness.

## 2022-04-27 DIAGNOSIS — E11.8 TYPE 2 DIABETES MELLITUS WITH COMPLICATIONS (HCC): ICD-10-CM

## 2022-04-28 RX ORDER — BLOOD-GLUCOSE METER
1 KIT MISCELLANEOUS DAILY
Qty: 100 EACH | Refills: 0 | Status: SHIPPED | OUTPATIENT
Start: 2022-04-28 | End: 2022-05-02

## 2022-04-29 ENCOUNTER — PATIENT MESSAGE (OUTPATIENT)
Dept: INTERNAL MEDICINE CLINIC | Age: 62
End: 2022-04-29

## 2022-04-29 NOTE — TELEPHONE ENCOUNTER
From: Peter Schaefer  To: Dr. Felipe Lyn: 4/29/2022 4:03 PM EDT  Subject: Test strips    Hi Dr Landy Lundborg,  I saw that you sent a prescription to [facility] for test strips, but my insurance doesnt cover the freestyle lite. You ordered the [facility] 14 day reader and it has a built in glucometer in addition to the flash glucose monitoring system. Can you send an order to [facility] on [address] for the [facility] Precision Chad blood glucose test strips? Thanks!   VALENTINA Heller [facility]  [phone]

## 2022-05-02 ENCOUNTER — HOSPITAL ENCOUNTER (OUTPATIENT)
Dept: SLEEP CENTER | Age: 62
Discharge: HOME | End: 2022-05-02
Payer: MEDICAID

## 2022-05-02 DIAGNOSIS — G47.30 OBSERVED SLEEP APNEA: ICD-10-CM

## 2022-05-02 DIAGNOSIS — G47.33 OSA (OBSTRUCTIVE SLEEP APNEA): ICD-10-CM

## 2022-05-02 DIAGNOSIS — R53.83 FATIGUE, UNSPECIFIED TYPE: ICD-10-CM

## 2022-05-02 DIAGNOSIS — G47.19 EXCESSIVE DAYTIME SLEEPINESS: ICD-10-CM

## 2022-05-02 DIAGNOSIS — Z86.79 H/O ISCHEMIC HEART DISEASE: ICD-10-CM

## 2022-05-02 DIAGNOSIS — R06.83 SNORING: ICD-10-CM

## 2022-05-02 DIAGNOSIS — R06.89 GASPING FOR BREATH: ICD-10-CM

## 2022-05-02 PROCEDURE — 95810 POLYSOM 6/> YRS 4/> PARAM: CPT

## 2022-05-02 RX ORDER — BLOOD SUGAR DIAGNOSTIC
1 STRIP MISCELLANEOUS DAILY
Qty: 100 EACH | Refills: 3 | Status: SHIPPED | OUTPATIENT
Start: 2022-05-02 | End: 2022-06-15

## 2022-05-04 ENCOUNTER — TELEPHONE (OUTPATIENT)
Dept: PULMONOLOGY | Age: 62
End: 2022-05-04

## 2022-05-04 PROCEDURE — 95810 POLYSOM 6/> YRS 4/> PARAM: CPT | Performed by: PSYCHIATRY & NEUROLOGY

## 2022-05-04 NOTE — TELEPHONE ENCOUNTER
Message left for patient re; Sleep study showed a mild CHRIS. AHI was 8.6  per hr. And O2 Desaturations to 88%. Dr Tomas vieyra.

## 2022-05-04 NOTE — TELEPHONE ENCOUNTER
Patient returned call and expressed understanding. She was given the number to schedule a titration.

## 2022-05-04 NOTE — PROGRESS NOTES
[address] [facility]     Outpatient Cardiology         Patient Name:  Iván Wiseman  Requesting Physician: No admitting provider for patient encounter. Primary Care Physician: Tete Rush MD    Reason for Consultation/Chief Complaint:   No chief complaint on file. History of Present Illness:    JIMMY Howard a 60 y.o. female with PMH of CAD, S/p SAMARIA x 3 to LAD on 9/7/48 complicated with RP bleed after,  HLD, sleep apnea, DM II, HTN, PSVT, SAH, CORONA. Here to establish care for CAD/HLD/HTN/PSVT. Referred by PCP. Previously seen by Dr. Fabiola Langley. CAD, asymptomatic. Doing okay with aspirin. Today we will discontinue Plavix. HLD, on Lipitor 40, no side effects  HTN, controlled on current medications  PSVT, symptomatic PACs  Overall she is doing well from a cardiovascular perspective. PMH  Past Medical History:   Diagnosis Date    Anxiety and depression     AV block, 1st degree     B12 deficiency     Balance disorder     frequent falls    Chronic ischemic heart disease     sp stents-was at [facility]-dr davalos?     Diabetic polyneuropathy associated with type 2 diabetes mellitus ([address]     Essential (primary) hypertension     GERD (gastroesophageal reflux disease)     Hyperparathyroidism ([address]     Hypothyroidism     Leukemia ([address]     acute lymphoblastic leukemia- [facility]    Low back pain     Mild intermittent asthma without complication     cold induced    Muscle spasms of neck     CORONA (nonalcoholic steatohepatitis)     saw dr [place]    Osteoarthritis     hands knees, hips    Osteopenia 2021    dexa    Other hyperlipidemia     PSVT (paroxysmal supraventricular tachycardia) (HCC)     Restless leg syndrome     Sleep apnea     Subarachnoid hemorrhage ([address] 2003    Type 2 diabetes mellitus with complications (HCC)        PSH  Past Surgical History:   Procedure Laterality Date    ABDOMINAL EXPLORATION SURGERY      CHOLECYSTECTOMY, LAPAROSCOPIC      LIVER BIOPSY      OTHER SURGICAL HISTORY Right     radial head fx    MALLORY AND BSO      2 surgeries, 1985, 2001    UPPER GASTROINTESTINAL ENDOSCOPY      ? Social HIstory  Social History     Tobacco Use    Smoking status: Never Smoker    Smokeless tobacco: Never Used   Substance Use Topics    Alcohol use: Never    Drug use: Never       Family History  Family History   Problem Relation Age of Onset    Asthma Mother     Heart Failure Mother     Heart Disease Father     Lung Cancer Father     Other Brother         factor 5 leiden       Allergies   Allergies   Allergen Reactions    Nitrofurantoin Anaphylaxis, Hives, Itching, Shortness Of Breath and Swelling    Cisapride Diarrhea    Fexofenadine-Pseudoephed Er Palpitations     HEART RACING      Metoclopramide Diarrhea and Hives    Codeine Dizziness or Vertigo and Other (See Comments)     HALLUCINATE      Iodides Other (See Comments)     SNEEZING    Oxycodone-Acetaminophen Other (See Comments)     HALLUCINATE     Sulfamethoxazole-Trimethoprim Hives and Other (See Comments)     Surpresses bone marrow      Ciprofloxacin Hives    Clarithromycin Hives       Medications:     Home Medications:  Were reviewed and are listed in nursing record. and/or listed below    Prior to Admission medications    Medication Sig Start Date End Date Taking? Authorizing Provider   blood glucose test strips (FREESTYLE PRECISION WALE TEST) strip 1 each by In Vitro route daily As needed.  5/2/22   Carlota Mccracken MD   Continuous Blood Gluc Sensor (FREESTYLE RAND 14 DAY SENSOR) MISC 1 Device by Does not apply route daily DX E11.8 4/21/22   Carlota Mccracken MD   Continuous Blood Gluc  (FREESTYLE RAND 14 DAY READER) JESUS 1 Device by Does not apply route daily DX E11.8 4/21/22   Carlota Mccracken MD   isosorbide mononitrate (IMDUR) 30 MG extended release tablet Take 1 tablet by mouth daily 4/13/22   Carlota Mccracken MD   magnesium oxide (MAG-OX) 400 MG tablet Take 1 tablet by mouth daily 4/7/22   Meena Whitten MD   insulin glargine (LANTUS SOLOSTAR) 100 UNIT/ML injection pen Inject 38 Units into the skin nightly 4/7/22   Meena Whitten MD   Ascorbic Acid (VITAMIN C) 1000 MG tablet     Historical Provider, MD   aspirin 81 MG EC tablet Take 81 mg by mouth daily    Historical Provider, MD   Calcium Carbonate-Vitamin D (OYSTER SHELL CALCIUM/D) 500-200 MG-UNIT TABS Take 1 tablet by mouth 2 times daily (with meals)    Historical Provider, MD   Cholecalciferol (VITAMIN D3) 125 MCG (5000 UT) CAPS     Historical Provider, MD   cyanocobalamin 250 MCG tablet Take 250 mcg by mouth daily    Historical Provider, MD   Insulin Pen Needle 32G X 5 MM [facility]     Historical Provider, MD   Melatonin 10 MG SUBL     Historical Provider, MD   methocarbamol (ROBAXIN) 500 MG tablet Take 500 mg by mouth 3 times daily as needed 11/17/21   Historical Provider, MD   albuterol sulfate  (90 Base) MCG/ACT inhaler Inhale 2 puffs into the lungs every 6 hours as needed for Wheezing 4/6/22   Meena Whitten MD   atorvastatin (LIPITOR) 40 MG tablet Take 1 tablet by mouth daily 4/6/22   Meena Whitten MD   clopidogrel (PLAVIX) 75 MG tablet Take 1 tablet by mouth daily 4/6/22   Meena Whitten MD   DULoxetine (CYMBALTA) 60 MG extended release capsule Take 1 capsule by mouth daily 4/6/22   Meena Whitten MD   gabapentin (NEURONTIN) 800 MG tablet Take 1 tablet by mouth 2 times daily for 90 days.  4/6/22 7/5/22  Meena Whitten MD   levothyroxine (SYNTHROID) 50 MCG tablet TAKE ONE TABLET BY MOUTH ON MONDAY THROUGH FRIDAY AND 2 TABLETS BY MOUTH ON SATURDAY AND SUNDAY 4/6/22   Meena Whitten MD   metFORMIN (GLUCOPHAGE) 500 MG tablet Take 2 tablets by mouth 2 times daily 4/6/22   Meena Whitten MD   metoprolol tartrate (LOPRESSOR) 25 MG tablet Take 0.5 tablets by mouth 2 times daily 4/6/22   Aries Cui MD   pantoprazole (PROTONIX) 40 MG tablet Take 1 tablet by mouth daily 4/6/22   Aries Cui MD   insulin lispro, 1 Unit Dial, (HUMALOG KWIKPEN) 100 UNIT/ML SOPN Use up  To 35 units daily per sliding scale 4/6/22   Aries Cui MD   cetirizine (ZYRTEC ALLERGY) 10 MG tablet Take 1 tablet by mouth daily 4/6/22   Aries Cui MD        Review of Systems    There were no vitals filed for this visit. There were no vitals filed for this visit.     BP Readings from Last 3 Encounters:   04/12/22 110/60   04/06/22 132/64       Wt Readings from Last 3 Encounters:   04/12/22 170 lb (77.1 kg)   04/06/22 171 lb 3.2 oz (77.7 kg)       Physical Exam    Labs:       Lab Results   Component Value Date    WBC 3.6 (L) 04/06/2022    HGB 13.2 04/06/2022    HCT 39.1 04/06/2022    MCV 88.8 04/06/2022     04/06/2022     Lab Results   Component Value Date     04/06/2022    K 4.7 04/06/2022     04/06/2022    CO2 26 04/06/2022    BUN 18 04/06/2022    CREATININE 0.8 04/06/2022    GLUCOSE 178 (H) 04/06/2022    CALCIUM 9.5 04/06/2022    PROT 6.4 04/06/2022    LABALBU 4.4 04/06/2022    BILITOT 0.6 04/06/2022    ALKPHOS 85 04/06/2022    AST 25 04/06/2022    ALT 30 04/06/2022    LABGLOM >60 04/06/2022    GFRAA >60 04/06/2022    AGRATIO 2.2 04/06/2022         Lab Results   Component Value Date    CHOL 168 04/06/2022     Lab Results   Component Value Date    TRIG 158 (H) 04/06/2022     Lab Results   Component Value Date    HDL 57 04/06/2022     Lab Results   Component Value Date    LDLCALC 79 04/06/2022     Lab Results   Component Value Date    LABVLDL 32 04/06/2022     No results found for: CHOLHDLRATIO    No results found for: INR, PROTIME    The 10-year ASCVD risk score (Tiffany Hanks, et al., 2013) is: 5.7%    Values used to calculate the score:      Age: 60 years      Sex: Female      Is Non- : No      Diabetic: Yes      Tobacco smoker: No      Systolic Blood Pressure: 771 mmHg      Is BP treated: No      HDL Cholesterol: 57 mg/dL      Total Cholesterol: 168 mg/dL      Imaging:       Last ECG (if available, Personally interpreted):    Last Monitor/Holter (if available):    Last Stress (if available): 6/10/21  IMPRESSIONS    Normal perfusion images with no evidence of ischemia    Hyperdynamic LV function     Last Cath (if available): 3/8/19  Result Date: 3/8/2019  · 70% stenosis of the mid LAD and 60% of proximal LAD. FFR was 0.59 with significant stenosis of the proximal segment on pullback so both lesions were stented. · 1 SAMARIA placed in the proximal LAD · 2 SAMARIA placed in the mid to distal LAD with good results. Plan: 1. Ticagrelor daily for a minimum of 12 months 2. Daily aspirin indefinitely 3. Standard secondary preventative medical therapy (statin, beta blocker, ACEI if needed) 4. Start Imdur for anti-anginal management     Cardiac Procedure    Result Date: 3/8/2019  · Proximal LAD 30-40%, mid 30-40%, distal 40-50% · Cx 20-30% · Proximal RCA 20%. · The ejection fraction is greater than 55% by visual estimate. Plan FFR of LAD     Last TTE/KARLA(if available):    Last CMR  (if available):    Last Coronary Artery Calcium Score: Ankle-brachial index:    Carotid ultrasound screening:    Abdominal aortic aneurysm screening:       Assessment / Plan:     Coronary artery disease involving native coronary artery of native heart without angina pectoris  Asymptomatic. Continue aspirin 81. Discontinue Plavix. Discontinue Imdur. The change metoprolol to 25 mg daily (succinate)      Primary hypertension  Controlled on current dose of Imdur and metoprolol. Will adjust metoprolol discontinue Imdur. Palpitations  EKG today completely normal.  Likely PACs from apple watch strip    She also takes Lipitor 40, her LDL is at goal.    Follow up in 3 months.     I had the opportunity to review the clinical symptoms and presentation of Wendie Brand. Patient's allergies and medications were reviewed and updated. Patient's past medical, surgical, social and family history were reviewed and updated. Patient's testing including laboratory, ECGs, monitor, imaging (TTE,WILLIAM,CMR,cath) were reviewed. Tobacco use was discussed with the patient and educated on the negative effects. I have asked the patient to not utilize these agents. All questions and concerns were addressed to the patient/family. Alternatives to my treatment were discussed. The note was completed using EMR. Every effort wasmade to ensure accuracy; however, inadvertent computerized transcription errors may be present. Thank you for allowing me to participate in thedaniel or Harry Rodriguez MD, [facility], [facility] [address]

## 2022-05-05 ENCOUNTER — OFFICE VISIT (OUTPATIENT)
Dept: CARDIOLOGY CLINIC | Age: 62
End: 2022-05-05
Payer: MEDICAID

## 2022-05-05 VITALS
DIASTOLIC BLOOD PRESSURE: 84 MMHG | HEART RATE: 70 BPM | SYSTOLIC BLOOD PRESSURE: 122 MMHG | BODY MASS INDEX: 27.88 KG/M2 | WEIGHT: 173.8 LBS

## 2022-05-05 DIAGNOSIS — I25.10 CORONARY ARTERY DISEASE INVOLVING NATIVE CORONARY ARTERY OF NATIVE HEART WITHOUT ANGINA PECTORIS: ICD-10-CM

## 2022-05-05 DIAGNOSIS — I10 PRIMARY HYPERTENSION: ICD-10-CM

## 2022-05-05 DIAGNOSIS — I25.9 CHRONIC ISCHEMIC HEART DISEASE: Primary | ICD-10-CM

## 2022-05-05 DIAGNOSIS — R00.2 PALPITATIONS: ICD-10-CM

## 2022-05-05 DIAGNOSIS — Z01.810 PREOP CARDIOVASCULAR EXAM: ICD-10-CM

## 2022-05-05 PROCEDURE — G8419 CALC BMI OUT NRM PARAM NOF/U: HCPCS | Performed by: INTERNAL MEDICINE

## 2022-05-05 PROCEDURE — 99204 OFFICE O/P NEW MOD 45 MIN: CPT | Performed by: INTERNAL MEDICINE

## 2022-05-05 PROCEDURE — 93000 ELECTROCARDIOGRAM COMPLETE: CPT | Performed by: INTERNAL MEDICINE

## 2022-05-05 PROCEDURE — G8427 DOCREV CUR MEDS BY ELIG CLIN: HCPCS | Performed by: INTERNAL MEDICINE

## 2022-05-05 PROCEDURE — 1036F TOBACCO NON-USER: CPT | Performed by: INTERNAL MEDICINE

## 2022-05-05 PROCEDURE — 3017F COLORECTAL CA SCREEN DOC REV: CPT | Performed by: INTERNAL MEDICINE

## 2022-05-05 RX ORDER — METOPROLOL SUCCINATE 25 MG/1
25 TABLET, EXTENDED RELEASE ORAL DAILY
Qty: 30 TABLET | Refills: 5 | Status: SHIPPED | OUTPATIENT
Start: 2022-05-05 | End: 2022-09-13 | Stop reason: SDUPTHER

## 2022-05-05 NOTE — ASSESSMENT & PLAN NOTE
Okay to proceed with planned procedure  Okay to hold aspirin prior to procedure. Today we are discontinuing Plavix.

## 2022-05-05 NOTE — ASSESSMENT & PLAN NOTE
Asymptomatic. Continue aspirin 81. Discontinue Plavix. Discontinue Imdur.   The change metoprolol to 25 mg daily (succinate)  We will get echocardiogram

## 2022-05-13 ENCOUNTER — HOSPITAL ENCOUNTER (OUTPATIENT)
Dept: NON INVASIVE DIAGNOSTICS | Age: 62
Discharge: HOME | End: 2022-05-13
Payer: MEDICAID

## 2022-05-13 ENCOUNTER — HOSPITAL ENCOUNTER (OUTPATIENT)
Dept: MAMMOGRAPHY | Age: 62
Discharge: HOME | End: 2022-05-13
Payer: MEDICAID

## 2022-05-13 VITALS — WEIGHT: 170 LBS | HEIGHT: 66 IN | BODY MASS INDEX: 27.32 KG/M2

## 2022-05-13 DIAGNOSIS — I25.9 CHRONIC ISCHEMIC HEART DISEASE: ICD-10-CM

## 2022-05-13 DIAGNOSIS — I25.10 CORONARY ARTERY DISEASE INVOLVING NATIVE CORONARY ARTERY OF NATIVE HEART WITHOUT ANGINA PECTORIS: ICD-10-CM

## 2022-05-13 DIAGNOSIS — Z12.31 VISIT FOR SCREENING MAMMOGRAM: ICD-10-CM

## 2022-05-13 LAB
LV EF: 63 %
LVEF MODALITY: NORMAL

## 2022-05-13 PROCEDURE — 93306 TTE W/DOPPLER COMPLETE: CPT

## 2022-05-13 PROCEDURE — 77067 SCR MAMMO BI INCL CAD: CPT

## 2022-05-23 ENCOUNTER — TELEPHONE (OUTPATIENT)
Dept: INTERNAL MEDICINE CLINIC | Age: 62
End: 2022-05-23

## 2022-05-23 DIAGNOSIS — E11.8 TYPE 2 DIABETES MELLITUS WITH COMPLICATIONS (HCC): ICD-10-CM

## 2022-05-23 NOTE — TELEPHONE ENCOUNTER
----- Message from Tram Rob sent at 5/23/2022  4:23 PM EDT -----  Subject: Referral Request    QUESTIONS   Reason for referral request? Dr Grupo Nazario is asking that her thyroid issues   be added to her referral before her appt please. Has the physician seen you for this condition before? Yes  Select a date? 2022-04-06  Select the Provider the patient wants to be referred to, if known (PCP or   Specialist)? Mary Younger   Preferred Specialist (if applicable)? Do you already have an appointment scheduled? Yes  Select Scheduled Date? 2022-08-29  Select Scheduled Physician? Additional Information for Provider?   ---------------------------------------------------------------------------  --------------  CALL BACK INFO  What is the best way for the office to contact you? Do not leave any   message, patient will call back for answer  Preferred Call Back Phone Number? [phone]  ---------------------------------------------------------------------------  --------------  SCRIPT ANSWERS  Relationship to Patient?  Self

## 2022-05-23 NOTE — TELEPHONE ENCOUNTER
----- Message from Macho Steve sent at 5/23/2022  3:12 PM EDT -----  Subject: Refill Request    QUESTIONS  Name of Medication? Insulin Pen Needle 32G X 5 MM MISC  Patient-reported dosage and instructions? Patient uses needles to give   herself shot, 5 per day  How many days do you have left? 0  Preferred Pharmacy? [facility] [phone]  Pharmacy phone number (if available)? [phone]  ---------------------------------------------------------------------------  --------------  [employer]  What is the best way for the office to contact you? OK to leave message on   voicemail, OK to respond with electronic message via [employer] portal (only   for patients who have registered [employer] account)  Preferred Call Back Phone Number? [phone]  ---------------------------------------------------------------------------  --------------  SCRIPT ANSWERS  Relationship to Patient?  Self

## 2022-05-24 ENCOUNTER — TELEPHONE (OUTPATIENT)
Dept: ADMINISTRATIVE | Age: 62
End: 2022-05-24

## 2022-05-24 NOTE — TELEPHONE ENCOUNTER
Submitted PA for FreeStyle Precision Chad Test strips  [facility]  Key: [id number]  STATUS: DENIED. LETTER IS ATTACHED. If this requires a response please respond to the pool. [address]). Please advise patient thank you.

## 2022-05-26 NOTE — TELEPHONE ENCOUNTER
Let her know the Freestyle glucose monitoring system was denied by her insurance company. They want her to use \"One Touch ultra\" and Verio test strips. Does she want to try that instead?

## 2022-05-26 NOTE — TELEPHONE ENCOUNTER
The pt picked up Freestyle Precision Chad monitor and strips ( insurance covered) last week     If patient needs refills the pt will call when she needs refills

## 2022-05-27 ENCOUNTER — HOSPITAL ENCOUNTER (OUTPATIENT)
Age: 62
Setting detail: OUTPATIENT SURGERY
Discharge: HOME | End: 2022-05-27
Attending: INTERNAL MEDICINE | Admitting: INTERNAL MEDICINE
Payer: MEDICAID

## 2022-05-27 ENCOUNTER — ANESTHESIA EVENT (OUTPATIENT)
Dept: ENDOSCOPY | Age: 62
End: 2022-05-27
Payer: MEDICAID

## 2022-05-27 ENCOUNTER — ANESTHESIA (OUTPATIENT)
Dept: ENDOSCOPY | Age: 62
End: 2022-05-27
Payer: MEDICAID

## 2022-05-27 VITALS
HEART RATE: 81 BPM | WEIGHT: 170 LBS | OXYGEN SATURATION: 99 % | HEIGHT: 66 IN | TEMPERATURE: 97.1 F | DIASTOLIC BLOOD PRESSURE: 93 MMHG | BODY MASS INDEX: 27.32 KG/M2 | SYSTOLIC BLOOD PRESSURE: 162 MMHG | RESPIRATION RATE: 18 BRPM

## 2022-05-27 DIAGNOSIS — R13.19 ESOPHAGEAL DYSPHAGIA: ICD-10-CM

## 2022-05-27 DIAGNOSIS — R19.8 ALTERNATING CONSTIPATION AND DIARRHEA: ICD-10-CM

## 2022-05-27 LAB
GLUCOSE BLD-MCNC: 129 MG/DL (ref 70–99)
PERFORMED ON: ABNORMAL

## 2022-05-27 PROCEDURE — 6360000002 HC RX W HCPCS: Performed by: NURSE ANESTHETIST, CERTIFIED REGISTERED

## 2022-05-27 PROCEDURE — 7100000010 HC PHASE II RECOVERY - FIRST 15 MIN: Performed by: INTERNAL MEDICINE

## 2022-05-27 PROCEDURE — 88305 TISSUE EXAM BY PATHOLOGIST: CPT

## 2022-05-27 PROCEDURE — 3700000001 HC ADD 15 MINUTES (ANESTHESIA): Performed by: INTERNAL MEDICINE

## 2022-05-27 PROCEDURE — 2500000003 HC RX 250 WO HCPCS: Performed by: NURSE ANESTHETIST, CERTIFIED REGISTERED

## 2022-05-27 PROCEDURE — 3609012400 HC EGD TRANSORAL BIOPSY SINGLE/MULTIPLE: Performed by: INTERNAL MEDICINE

## 2022-05-27 PROCEDURE — 7100000011 HC PHASE II RECOVERY - ADDTL 15 MIN: Performed by: INTERNAL MEDICINE

## 2022-05-27 PROCEDURE — 3700000000 HC ANESTHESIA ATTENDED CARE: Performed by: INTERNAL MEDICINE

## 2022-05-27 PROCEDURE — 3609027000 HC COLONOSCOPY: Performed by: INTERNAL MEDICINE

## 2022-05-27 PROCEDURE — 88342 IMHCHEM/IMCYTCHM 1ST ANTB: CPT

## 2022-05-27 PROCEDURE — 2709999900 HC NON-CHARGEABLE SUPPLY: Performed by: INTERNAL MEDICINE

## 2022-05-27 PROCEDURE — 88341 IMHCHEM/IMCYTCHM EA ADD ANTB: CPT

## 2022-05-27 PROCEDURE — 2580000003 HC RX 258: Performed by: ANESTHESIOLOGY

## 2022-05-27 RX ORDER — SODIUM CHLORIDE 9 MG/ML
INJECTION, SOLUTION INTRAVENOUS PRN
Status: DISCONTINUED | OUTPATIENT
Start: 2022-05-27 | End: 2022-05-27 | Stop reason: HOSPADM

## 2022-05-27 RX ORDER — PROPOFOL 10 MG/ML
INJECTION, EMULSION INTRAVENOUS PRN
Status: DISCONTINUED | OUTPATIENT
Start: 2022-05-27 | End: 2022-05-27 | Stop reason: SDUPTHER

## 2022-05-27 RX ORDER — POLYETHYLENE GLYCOL-3350 AND ELECTROLYTES 236; 6.74; 5.86; 2.97; 22.74 G/274.31G; G/274.31G; G/274.31G; G/274.31G; G/274.31G
POWDER, FOR SOLUTION ORAL
COMMUNITY
Start: 2022-05-26 | End: 2022-08-08

## 2022-05-27 RX ORDER — SODIUM CHLORIDE 0.9 % (FLUSH) 0.9 %
5-40 SYRINGE (ML) INJECTION EVERY 12 HOURS SCHEDULED
Status: DISCONTINUED | OUTPATIENT
Start: 2022-05-27 | End: 2022-05-27 | Stop reason: HOSPADM

## 2022-05-27 RX ORDER — LIDOCAINE HYDROCHLORIDE 10 MG/ML
1 INJECTION, SOLUTION EPIDURAL; INFILTRATION; INTRACAUDAL; PERINEURAL
Status: DISCONTINUED | OUTPATIENT
Start: 2022-05-27 | End: 2022-05-27 | Stop reason: HOSPADM

## 2022-05-27 RX ORDER — GLYCOPYRROLATE 1 MG/5 ML
SYRINGE (ML) INTRAVENOUS PRN
Status: DISCONTINUED | OUTPATIENT
Start: 2022-05-27 | End: 2022-05-27 | Stop reason: SDUPTHER

## 2022-05-27 RX ORDER — SODIUM CHLORIDE, SODIUM LACTATE, POTASSIUM CHLORIDE, CALCIUM CHLORIDE 600; 310; 30; 20 MG/100ML; MG/100ML; MG/100ML; MG/100ML
INJECTION, SOLUTION INTRAVENOUS CONTINUOUS
Status: DISCONTINUED | OUTPATIENT
Start: 2022-05-27 | End: 2022-05-27 | Stop reason: HOSPADM

## 2022-05-27 RX ORDER — PROPOFOL 10 MG/ML
INJECTION, EMULSION INTRAVENOUS CONTINUOUS PRN
Status: DISCONTINUED | OUTPATIENT
Start: 2022-05-27 | End: 2022-05-27 | Stop reason: SDUPTHER

## 2022-05-27 RX ORDER — SODIUM CHLORIDE 0.9 % (FLUSH) 0.9 %
5-40 SYRINGE (ML) INJECTION PRN
Status: DISCONTINUED | OUTPATIENT
Start: 2022-05-27 | End: 2022-05-27 | Stop reason: HOSPADM

## 2022-05-27 RX ORDER — LIDOCAINE HYDROCHLORIDE 20 MG/ML
INJECTION, SOLUTION EPIDURAL; INFILTRATION; INTRACAUDAL; PERINEURAL PRN
Status: DISCONTINUED | OUTPATIENT
Start: 2022-05-27 | End: 2022-05-27 | Stop reason: SDUPTHER

## 2022-05-27 RX ORDER — LANOLIN ALCOHOL/MO/W.PET/CERES
CREAM (GRAM) TOPICAL
COMMUNITY
Start: 2022-05-23 | End: 2022-06-07

## 2022-05-27 RX ADMIN — SODIUM CHLORIDE, POTASSIUM CHLORIDE, SODIUM LACTATE AND CALCIUM CHLORIDE: 600; 310; 30; 20 INJECTION, SOLUTION INTRAVENOUS at 07:16

## 2022-05-27 RX ADMIN — PROPOFOL 30 MG: 10 INJECTION, EMULSION INTRAVENOUS at 08:01

## 2022-05-27 RX ADMIN — Medication 0.4 MG: at 07:56

## 2022-05-27 RX ADMIN — PROPOFOL 100 MG: 10 INJECTION, EMULSION INTRAVENOUS at 07:44

## 2022-05-27 RX ADMIN — PROPOFOL 125 MCG/KG/MIN: 10 INJECTION, EMULSION INTRAVENOUS at 07:44

## 2022-05-27 RX ADMIN — LIDOCAINE HYDROCHLORIDE 100 MG: 20 INJECTION, SOLUTION EPIDURAL; INFILTRATION; INTRACAUDAL; PERINEURAL at 07:44

## 2022-05-27 ASSESSMENT — PAIN SCALES - GENERAL
PAINLEVEL_OUTOF10: 0
PAINLEVEL_OUTOF10: 0

## 2022-05-27 ASSESSMENT — LIFESTYLE VARIABLES: SMOKING_STATUS: 0

## 2022-05-27 NOTE — PROCEDURES
[address] [place] [address]/[facility]  Colonoscopy Note    Patient: Carlos Seay  : 1960  Acct#:     Procedure: Colonoscopy     Date:  2022    Surgeon:  Alcira Watkins MD     Referring Physician:  Deb Castaneda MD    Anesthesia: IV propofol, per anesthesia    EBL: <50 mL    Indications: This is a 60y.o. year old female who presents today with screening for colon cancer. Procedure: An informed consent was obtained from the patient after explanation of indications, benefits, possible risks and complications of the procedure. The patient was then taken to the endoscopy suite, placed in the left lateral decubitus position, and the above IV anesthesia was administered. A digital rectal examination was performed and revealed negative without mass, lesions or tenderness. The Olympus PCFQ-H190 video colonoscope was placed in the patient's rectum under digital direction and advanced to the cecum. The cecum was identified by characteristic anatomy and ballottment. The prep was adequate. The ileocecal valve was intubated     Findings:  Visualization of the terminal ileum demonstrated normal mucosa. The colon was normal.       The scope was then withdrawn into the rectum and retroflexed. The retroflexed view of the anal verge and rectum demonstrates no significant hemorrhoids. The scope was straightened, the colon was decompressed and the scope was withdrawn from the patient. The patient tolerated the procedure well and was taken to the PACU in good condition. Biopsies:  No     Impression:   Normal terminal ileum. Normal colon.     Recommendations:  Colonoscopy in 10 years    Deb Castaneda MD  [facility]

## 2022-05-27 NOTE — PROCEDURES
[address] [place] [address]  Endoscopy Note    Patient: Regino Mccartney  : 1960  Acct#:     Procedure: Esophagogastroduodenoscopy with biopsy    Date:  2022     Surgeon:  Scott Medina MD      Anesthesia:    IV propofol, per anesthesia       EBL: <50 mL    Indications:   GERD, epigastric abdominal pain, weight loss, nausea vomiting, early satiety, dysphagia      Description of Procedure:    Informed consent was obtained from the patient after explanation of indications, benefits and possible risks and complications of the procedure. The patient was then taken to the endoscopy suite, placed in the left lateral decubitus position and the above IV sedation was administrered. The Olympus videoendoscope ([id number]-H190) was placed in the patient's mouth and under direct visualization passed into the esophagus. The scope was then advanced into the stomach and to the second portion of the duodenum. A retroflexed exam of the gastric cardia and fundus was performed. The scope was then withdrawn back into the stomach, it was decompressed, and the scope was completely withdrawn. Findings:    Normal first and second portion of the duodenum. Biopsies were obtained evaluate for celiac disease. A few erosions and mild patchy erythema in the gastric antrum. Biopsies were obtained evaluate for H. pylori. Normal esophagus. The patient tolerated the procedure well and was taken to the post anesthesia care unit in good condition. Biopsies: Yes. Impression:    Normal first and second portion of the duodenum. Biopsies were obtained evaluate for celiac disease. A few erosions and mild patchy erythema in the gastric antrum. Biopsies were obtained evaluate for H. pylori. Normal esophagus. Recommendations:   Await biopsy results.   PPI daily  Colonoscopy today      Scott Medina MD  [address] [place] [address]/[employer]

## 2022-05-27 NOTE — H&P
Gastroenterology Note                 Pre-operative History and Physical    Patient: Wendie Brand  : 1960  CSN:     History Obtained From:   Patient or guardian. HISTORY OF PRESENT ILLNESS:    The patient is a 60 y.o. female here for EGD/colon for GERD, dysphagia, chronic alt C/D, epig AP, 20# loss, n/v, early satiety. Past Medical History:    Past Medical History:   Diagnosis Date    Anxiety and depression     Asthma     AV block, 1st degree     B12 deficiency     Balance disorder     frequent falls    CAD (coronary artery disease)     Chronic ischemic heart disease     sp stents-was at [facility]-dr davalos?  CVA (cerebral vascular accident) [facility])     2003    Diabetic polyneuropathy associated with type 2 diabetes mellitus ([address]     Essential (primary) hypertension     GERD (gastroesophageal reflux disease)     H/O heart artery stent     X3    H/O total hysterectomy     Hyperparathyroidism ([address]     Hypothyroidism     Leukemia ([facility]     [facility]- [facility]    Low back pain     Mild intermittent asthma without complication     cold induced    Muscle spasms of neck     CORONA (nonalcoholic steatohepatitis)     saw dr Stacy Montoya    CHRIS (obstructive sleep apnea)     Osteoarthritis     hands knees, hips    Osteopenia     dexa    Other hyperlipidemia     PSVT (paroxysmal supraventricular tachycardia) (HCC)     Restless leg syndrome     Sleep apnea     mild    Subarachnoid hemorrhage ([address] 2003    Type 2 diabetes mellitus with complications ([facility]      Past Surgical History:    Past Surgical History:   Procedure Laterality Date    ABDOMINAL EXPLORATION SURGERY      CHOLECYSTECTOMY, LAPAROSCOPIC      LIVER BIOPSY      OTHER SURGICAL HISTORY Right     radial head fx    MALLORY AND BSO      2 surgeries, ,     UPPER GASTROINTESTINAL ENDOSCOPY      ?      Medications Prior to Admission:   No current facility-administered medications on file tablet Take 1 tablet by mouth daily          Allergies: Iodides, Nitrofurantoin, Nitrofurantoin macrocrystal, Cisapride, Fexofenadine-pseudoephed er, Metoclopramide, Codeine, Oxycodone, Oxycodone-acetaminophen, Sulfamethoxazole-trimethoprim, Ciprofloxacin, and Clarithromycin      Social History:   Social History     Tobacco Use    Smoking status: Never Smoker    Smokeless tobacco: Never Used   Substance Use Topics    Alcohol use: Never     Family History:   Family History   Problem Relation Age of Onset    Asthma Mother     Heart Failure Mother     Heart Disease Father     Lung Cancer Father     Other Brother         factor 5 leiden       PHYSICAL EXAM:      BP (!) 156/79   Pulse 67   Temp 97.3 °F (36.3 °C) (Temporal)   Resp 18   Ht 5' 6\" (1.676 m)   Wt 170 lb (77.1 kg)   SpO2 100%   BMI 27.44 kg/m²  I        Heart:  RRR, normal s1s2    Lungs:  CTA and normal effort    Abdomen:   Soft, nt nd. ASSESSMENT AND PLAN:    1. Patient is a 60 y.o. female here for endoscopy with MAC sedation. 2.  Procedure options, risks and benefits reviewed with patient and/or guardian. They express understanding.     Briseida Faye MD  [address] [place] [address]

## 2022-05-27 NOTE — ANESTHESIA POSTPROCEDURE EVALUATION
Department of Anesthesiology  Postprocedure Note    Patient: Guido Hemphill  MRN: [id number]  YOB: 1960  Date of evaluation: 5/27/2022  Time:  1:08 PM     Procedure Summary     Date: 05/27/22 Room / Location: [facility]    Anesthesia Start: 1713 Anesthesia Stop: 3286    Procedures:       EGD BIOPSY (N/A )      COLONOSCOPY (N/A ) Diagnosis:       Esophageal dysphagia      Alternating constipation and diarrhea      (Esophageal dysphagia [R13.19]  Alternating constipation and diarrhea [R19.8])    Surgeons: López Pearl MD Responsible Provider: Wander Bowens MD    Anesthesia Type: MAC ASA Status: 3          Anesthesia Type: No value filed. Saji Phase I: Saji Score: 10    Saji Phase II: Saji Score: 10    Last vitals: Reviewed and per EMR flowsheets.        Anesthesia Post Evaluation    Patient location during evaluation: bedside  Level of consciousness: awake and alert  Airway patency: patent  Nausea & Vomiting: no vomiting  Complications: no  Cardiovascular status: blood pressure returned to baseline  Respiratory status: acceptable  Hydration status: euvolemic  Multimodal analgesia pain management approach

## 2022-05-27 NOTE — ANESTHESIA PRE PROCEDURE
Department of Anesthesiology  Preprocedure Note       Name:  Rosalinda Escalera   Age:  60 y.o.  :  1960                                          MRN:  [id number]         Date:  2022      Surgeon: Aranza Raya):  Jeri Murrieta MD    Procedure: Procedure(s):  ESOPHAGOGASTRODUODENOSCOPY  COLONOSCOPY    Medications prior to admission:   Prior to Admission medications    Medication Sig Start Date End Date Taking? Authorizing Provider   magnesium oxide (MAG-OX) 400 (240 Mg) MG tablet  22   Historical Provider, MD   GAVILYTE-G 236 g solution  22   Historical Provider, MD   Insulin Pen Needle 32G X 5 MM MISC 1 each by Other route 5 times daily 22   Tsering Paulino MD   metoprolol succinate (TOPROL XL) 25 MG extended release tablet Take 1 tablet by mouth daily 22   Hernandez Schultz MD   blood glucose test strips (FREESTYLE PRECISION WALE TEST) strip 1 each by In Vitro route daily As needed.  22   Tsering Paulino MD   Continuous Blood Gluc Sensor (FREESTYLE RAND 14 DAY SENSOR) MISC 1 Device by Does not apply route daily DX E11.8 22   Tsering Paulino MD   Continuous Blood Gluc  (FREESTYLE RAND 14 DAY READER) JESUS 1 Device by Does not apply route daily DX E11.8 22   Tsering Paulino MD   insulin glargine (LANTUS SOLOSTAR) 100 UNIT/ML injection pen Inject 38 Units into the skin nightly 22   Tsering Paulino MD   Ascorbic Acid (VITAMIN C) 1000 MG tablet     Historical Provider, MD   aspirin 81 MG EC tablet Take 81 mg by mouth daily    Historical Provider, MD   Calcium Carbonate-Vitamin D (OYSTER SHELL CALCIUM/D) 500-200 MG-UNIT TABS Take 1 tablet by mouth 2 times daily (with meals)    Historical Provider, MD   Cholecalciferol (VITAMIN D3) 125 MCG (5000 UT) CAPS     Historical Provider, MD   cyanocobalamin 250 MCG tablet Take 250 mcg by mouth daily    Historical Provider, MD   Melatonin 10 MG SUBL     Historical Provider, MD methocarbamol (ROBAXIN) 500 MG tablet Take 500 mg by mouth 3 times daily as needed 11/17/21   Historical Provider, MD   albuterol sulfate  (90 Base) MCG/ACT inhaler Inhale 2 puffs into the lungs every 6 hours as needed for Wheezing 4/6/22   Zachariah Holden MD   atorvastatin (LIPITOR) 40 MG tablet Take 1 tablet by mouth daily 4/6/22   Zachariah Holden MD   DULoxetine (CYMBALTA) 60 MG extended release capsule Take 1 capsule by mouth daily 4/6/22   Zachariah Holden MD   gabapentin (NEURONTIN) 800 MG tablet Take 1 tablet by mouth 2 times daily for 90 days.  4/6/22 7/5/22  Zachariah Holden MD   levothyroxine (SYNTHROID) 50 MCG tablet TAKE ONE TABLET BY MOUTH ON MONDAY THROUGH FRIDAY AND 2 TABLETS BY MOUTH ON SATURDAY AND SUNDAY 4/6/22   Zachariah Holden MD   metFORMIN (GLUCOPHAGE) 500 MG tablet Take 2 tablets by mouth 2 times daily 4/6/22   Zachariah Holden MD   pantoprazole (PROTONIX) 40 MG tablet Take 1 tablet by mouth daily 4/6/22   Zachariah Holden MD   insulin lispro, 1 Unit Dial, (HUMALOG KWIKPEN) 100 UNIT/ML SOPN Use up  To 35 units daily per sliding scale 4/6/22   Zachariah Holden MD   cetirizine (ZYRTEC ALLERGY) 10 MG tablet Take 1 tablet by mouth daily 4/6/22   Zachariah Holden MD       Current medications:    Current Facility-Administered Medications   Medication Dose Route Frequency Provider Last Rate Last Admin    lidocaine PF 1 % injection 1 mL  1 mL IntraDERmal Once PRN Arthur Agarwal MD        lactated ringers infusion   IntraVENous Continuous Arthur Agarwal  mL/hr at 05/27/22 0716 New Bag at 05/27/22 0716    sodium chloride flush 0.9 % injection 5-40 mL  5-40 mL IntraVENous 2 times per day Arthur Agarwal MD        sodium chloride flush 0.9 % injection 5-40 mL  5-40 mL IntraVENous PRN Arthur Agarwal MD        0.9 % sodium chloride infusion   IntraVENous PRN Arthur Agarwal MD mellitus ([address]     Essential (primary) hypertension     GERD (gastroesophageal reflux disease)     H/O heart artery stent     X3    H/O total hysterectomy     Hyperparathyroidism ([facility]     Hypothyroidism     Leukemia ([facility]     [facility]- [facility]    Low back pain     Mild intermittent asthma without complication     cold induced    Muscle spasms of neck     CORONA (nonalcoholic steatohepatitis)     saw dr Eric Wheeler    CHRIS (obstructive sleep apnea)     Osteoarthritis     hands knees, hips    Osteopenia 2021    dexa    Other hyperlipidemia     PSVT (paroxysmal supraventricular tachycardia) (HCC)     Restless leg syndrome     Sleep apnea     mild    Subarachnoid hemorrhage ([address] 2003    Type 2 diabetes mellitus with complications ([facility]        Past Surgical History:        Procedure Laterality Date    ABDOMINAL EXPLORATION SURGERY      CHOLECYSTECTOMY, LAPAROSCOPIC      LIVER BIOPSY      OTHER SURGICAL HISTORY Right     radial head fx    MALLORY AND BSO      2 surgeries, 1985, 2001    UPPER GASTROINTESTINAL ENDOSCOPY      ?        Social History:    Social History     Tobacco Use    Smoking status: Never Smoker    Smokeless tobacco: Never Used   Substance Use Topics    Alcohol use: Never                                Counseling given: Not Answered      Vital Signs (Current):   Vitals:    05/27/22 0643   BP: (!) 156/79   Pulse: 67   Resp: 18   Temp: 97.3 °F (36.3 °C)   TempSrc: Temporal   SpO2: 100%   Weight: 170 lb (77.1 kg)   Height: 5' 6\" (1.676 m)                                              BP Readings from Last 3 Encounters:   05/27/22 (!) 156/79   05/05/22 122/84   04/12/22 110/60       NPO Status: Time of last liquid consumption: 0230                        Time of last solid consumption: 2000                        Date of last liquid consumption: 05/27/22                        Date of last solid food consumption: 05/25/22    BMI:   Wt Readings from Last 3 Encounters: 05/27/22 170 lb (77.1 kg)   05/13/22 170 lb (77.1 kg)   05/05/22 173 lb 12.8 oz (78.8 kg)     Body mass index is 27.44 kg/m². CBC:   Lab Results   Component Value Date    WBC 3.6 04/06/2022    RBC 4.40 04/06/2022    HGB 13.2 04/06/2022    HCT 39.1 04/06/2022    MCV 88.8 04/06/2022    RDW 13.9 04/06/2022     04/06/2022       CMP:   Lab Results   Component Value Date     04/06/2022    K 4.7 04/06/2022     04/06/2022    CO2 26 04/06/2022    BUN 18 04/06/2022    CREATININE 0.8 04/06/2022    GFRAA >60 04/06/2022    AGRATIO 2.2 04/06/2022    LABGLOM >60 04/06/2022    GLUCOSE 178 04/06/2022    PROT 6.4 04/06/2022    CALCIUM 9.5 04/06/2022    BILITOT 0.6 04/06/2022    ALKPHOS 85 04/06/2022    AST 25 04/06/2022    ALT 30 04/06/2022       POC Tests: No results for input(s): POCGLU, POCNA, POCK, POCCL, POCBUN, POCHEMO, POCHCT in the last 72 hours.     Coags: No results found for: PROTIME, INR, APTT    HCG (If Applicable): No results found for: PREGTESTUR, PREGSERUM, HCG, HCGQUANT     ABGs: No results found for: PHART, PO2ART, THZ8CFM, NIW8WYS, BEART, F2OCNKMD     Type & Screen (If Applicable):  No results found for: LABABO, LABRH    Drug/Infectious Status (If Applicable):  No results found for: HIV, HEPCAB    COVID-19 Screening (If Applicable): No results found for: COVID19        Anesthesia Evaluation    Airway: Mallampati: II  TM distance: >3 FB   Neck ROM: full  Mouth opening: > = 3 FB   Dental: normal exam         Pulmonary: breath sounds clear to auscultation  (+) sleep apnea (recent sleep study, does not have cpap yet):  asthma (controlled): seasonal asthma,     (-) not a current smoker                           Cardiovascular:    (+) hypertension:, CAD:, CABG/stent (x3 2019):, hyperlipidemia    (-) dysrhythmias (h/o SVT 2005, several episodes, started metoprolol) and  angina      Rhythm: regular  Rate: normal  Echocardiogram reviewed         Beta Blocker:  Dose within 24 Hrs      ROS comment: 5/13:   Normal left ventricle size, wall thickness, and systolic function with an   estimated ejection fraction of 60%-65%. No regional wall motion abnormalities are seen. Indeterminate diastolic function. Trivial mitral regurgitation. Mildly calcified aortic valve. The pulmonic valve is not well visualized. Neuro/Psych:   (+) CVA (2003 [facility] forgetfulness): residual symptoms, depression/anxiety    (-) seizures           GI/Hepatic/Renal:   (+) GERD: well controlled, liver disease (CORONA):, renal disease: kidney stones,      (-) bowel prep       Endo/Other:    (+) DiabetesType II DM, , hypothyroidism::., malignancy/cancer (leukemia 1975). Abdominal:             Vascular:     - DVT and PE. Other Findings:           Anesthesia Plan      MAC     ASA 3       Induction: intravenous. Anesthetic plan and risks discussed with patient. Plan discussed with CRNA.                     Bia Sanford MD   5/27/2022

## 2022-05-27 NOTE — PROGRESS NOTES
Ambulatory Surgery/Procedure Discharge Note    Vitals:    05/27/22 0830   BP: (!) 162/93   Pulse: 81   Resp: 18   Temp: 97.1 °F (36.2 °C)   SpO2: 99%       In: 500 [I.V.:500]  Out: -     Restroom use offered before discharge. Yes  Pt is toileted and discharge instructions were read at bedside, tolerates PO well. Pain assessment:  none  Pain Level: 0        Patient discharged to home/self care.  Patient discharged via wheel chair by this nurse to waiting family/S.O.       5/27/2022 9:10 AM

## 2022-06-01 ENCOUNTER — TELEPHONE (OUTPATIENT)
Dept: INTERNAL MEDICINE CLINIC | Age: 62
End: 2022-06-01

## 2022-06-01 ENCOUNTER — OFFICE VISIT (OUTPATIENT)
Dept: INTERNAL MEDICINE CLINIC | Age: 62
End: 2022-06-01
Payer: MEDICAID

## 2022-06-01 VITALS
OXYGEN SATURATION: 97 % | DIASTOLIC BLOOD PRESSURE: 70 MMHG | HEART RATE: 91 BPM | BODY MASS INDEX: 27.89 KG/M2 | SYSTOLIC BLOOD PRESSURE: 110 MMHG | WEIGHT: 172.8 LBS

## 2022-06-01 DIAGNOSIS — R42 DIZZINESS: ICD-10-CM

## 2022-06-01 DIAGNOSIS — M54.50 ACUTE BILATERAL LOW BACK PAIN, UNSPECIFIED WHETHER SCIATICA PRESENT: ICD-10-CM

## 2022-06-01 DIAGNOSIS — H93.13 TINNITUS OF BOTH EARS: ICD-10-CM

## 2022-06-01 DIAGNOSIS — E11.8 TYPE 2 DIABETES MELLITUS WITH COMPLICATIONS (HCC): Primary | ICD-10-CM

## 2022-06-01 DIAGNOSIS — Z85.6 HISTORY OF LEUKEMIA: ICD-10-CM

## 2022-06-01 DIAGNOSIS — R50.9 FEVER, UNSPECIFIED FEVER CAUSE: Primary | ICD-10-CM

## 2022-06-01 DIAGNOSIS — M54.2 NECK PAIN: ICD-10-CM

## 2022-06-01 DIAGNOSIS — R10.84 GENERALIZED ABDOMINAL PAIN: Primary | ICD-10-CM

## 2022-06-01 DIAGNOSIS — R06.02 SOB (SHORTNESS OF BREATH): ICD-10-CM

## 2022-06-01 DIAGNOSIS — M79.10 MYALGIA: ICD-10-CM

## 2022-06-01 DIAGNOSIS — R74.01 TRANSAMINITIS: ICD-10-CM

## 2022-06-01 LAB
CREATININE URINE: 345.2 MG/DL (ref 28–259)
MICROALBUMIN UR-MCNC: 5.1 MG/DL
MICROALBUMIN/CREAT UR-RTO: 14.8 MG/G (ref 0–30)

## 2022-06-01 PROCEDURE — 3017F COLORECTAL CA SCREEN DOC REV: CPT | Performed by: INTERNAL MEDICINE

## 2022-06-01 PROCEDURE — 3051F HG A1C>EQUAL 7.0%<8.0%: CPT | Performed by: INTERNAL MEDICINE

## 2022-06-01 PROCEDURE — 2022F DILAT RTA XM EVC RTNOPTHY: CPT | Performed by: INTERNAL MEDICINE

## 2022-06-01 PROCEDURE — G8419 CALC BMI OUT NRM PARAM NOF/U: HCPCS | Performed by: INTERNAL MEDICINE

## 2022-06-01 PROCEDURE — 1036F TOBACCO NON-USER: CPT | Performed by: INTERNAL MEDICINE

## 2022-06-01 PROCEDURE — 99215 OFFICE O/P EST HI 40 MIN: CPT | Performed by: INTERNAL MEDICINE

## 2022-06-01 PROCEDURE — G8427 DOCREV CUR MEDS BY ELIG CLIN: HCPCS | Performed by: INTERNAL MEDICINE

## 2022-06-01 NOTE — TELEPHONE ENCOUNTER
Patient called stating that she has been feeling some pain in her kidney area which started this afternoon, on the right and left side. Stating that she is miserable and has a low grade fever which is at 100.0 She says she was here in the office today but wasn't having as much pain as she is right now. She is not exactly sure what to do but would like some feed back form the Doctor.

## 2022-06-01 NOTE — PATIENT INSTRUCTIONS
[place] [id number]  [phone]  Hater    Due Prevnar 20, Shingrix, Covid 4    Labs and urine    Referral to dr Isidra Snowden    ENT for ear ringing, hearing loss    Increase hydration

## 2022-06-01 NOTE — PROGRESS NOTES
Panfilo Guthrie (:  1960) is a 60 y.o. female, here for evaluation of the following chief complaint(s):    Follow-up      ASSESSMENT/PLAN:  1. Type 2 diabetes mellitus with complications [facility])  Patient has upcoming visit with endocrinology. Remains on insulin and metformin  -     Microalbumin / Creatinine Urine Ratio  -     Diabetic Foot Exam  -     Comprehensive Metabolic Panel, Fasting; Future  2. Dizziness  Suspect some dehydration and electrolyte shifts from the colonoscopy. Encouraged hydration.  -     CBC; Future  -     Magnesium; Future  -     Isabelle Williamson MD, Otolaryngology, [facility]  3. History of leukemia  -     AFL - Zai Martines DO, Hematology Oncology, [facility]  4. Neck pain  -     XR CERVICAL SPINE (2-3 VIEWS); Future  5. Myalgia  -     CK; Future  -     Sedimentation Rate; Future  6. Tinnitus of both ears  -     Isabelle Williamson MD, Otolaryngology, [facility]  ---    Chronic ischemic heart disease  -     Saw Dr Jeramie Kinney who adjusted meds - off plavix and isosorbide, had echo wnl, changed her metoprolol to daily but she didn't change yet. Blood pressure is too low to add arb  CORONA (nonalcoholic steatohepatitis)  Had u/s of liver with GI  Gastroesophageal reflux disease without esophagitis  -     remains on PPI. Had normal EGD per pt    Other specified hypothyroidism  -    Stable on  levothyroxine    Diabetic polyneuropathy associated with type 2 diabetes mellitus   Stable on gabapentin    Other hyperlipidemia  -    stable on atorvastatin     B12 deficiency  -     Vitamin B12; Future  Anxiety and depression   Patient feels her symptoms are not well controlled. Referral to Dr. Sonya Cotter pending. Remains on duloxetine    Mild persistent asthma without complication  -    stable on albuterol and zyrtec    Restless leg syndrome  May relate to her SNRI. Continue gabapentin. Advised compression stockings for swelling in her legs.     Sleep apnea, unspecified type  - [facility] - cpap advised    Return in about 3 months (around 9/1/2022). SUBJECTIVE/OBJECTIVE:  HPI   Patient is here to follow-up on first visit. She complains of being very tired and dizzy and that she hurts all over. She also has tinnitus. She has noted all the symptoms since undergoing her colonoscopy prep last week. She also notes pain in her neck for years. She thinks she has a bulging disc which she would like further evaluated. Review of Systems   Constitutional: Positive for fatigue. HENT: Positive for hearing loss. Cardiovascular: Positive for leg swelling. Musculoskeletal: Positive for myalgias and neck pain. Neurological: Positive for dizziness. Psychiatric/Behavioral: Positive for dysphoric mood. Past Medical History:   Diagnosis Date    Anxiety and depression     Asthma     AV block, 1st degree     B12 deficiency     Balance disorder     frequent falls    CAD (coronary artery disease)     Chronic ischemic heart disease     sp stents-was at [facility]-dr davalos?     CVA (cerebral vascular accident) [facility])     2003    Diabetic polyneuropathy associated with type 2 diabetes mellitus ([address]     Essential (primary) hypertension     GERD (gastroesophageal reflux disease)     H/O heart artery stent     X3    H/O total hysterectomy     Hyperparathyroidism ([address]     Hypothyroidism     Leukemia ([address]     acute lymphoblastic leukemia- [facility]    Low back pain     Mild intermittent asthma without complication     cold induced    Muscle spasms of neck     CORONA (nonalcoholic steatohepatitis)     saw dr Eric Wheeler    CHRIS (obstructive sleep apnea)     Osteoarthritis     hands knees, hips    Osteopenia 2021    dexa    Other hyperlipidemia     PSVT (paroxysmal supraventricular tachycardia) (HCC)     Restless leg syndrome     Sleep apnea     mild    Subarachnoid hemorrhage ([address] 2003    Type 2 diabetes mellitus with complications [facility])        Current Outpatient Medications   Medication Sig Dispense Refill    magnesium oxide (MAG-OX) 400 (240 Mg) MG tablet       GAVILYTE-G 236 g solution       Insulin Pen Needle 32G X 5 MM MISC 1 each by Other route 5 times daily 100 each 11    metoprolol succinate (TOPROL XL) 25 MG extended release tablet Take 1 tablet by mouth daily 30 tablet 5    blood glucose test strips (FREESTYLE PRECISION WALE TEST) strip 1 each by In Vitro route daily As needed. 100 each 3    Continuous Blood Gluc Sensor (FREESTYLE RAND 14 DAY SENSOR) MISC 1 Device by Does not apply route daily DX E11.8 2 each 5    Continuous Blood Gluc  (FREESTYLE RAND 14 DAY READER) JESUS 1 Device by Does not apply route daily DX E11.8 2 each 5    insulin glargine (LANTUS SOLOSTAR) 100 UNIT/ML injection pen Inject 38 Units into the skin nightly 5 pen 0    Ascorbic Acid (VITAMIN C) 1000 MG tablet       aspirin 81 MG EC tablet Take 81 mg by mouth daily      Calcium Carbonate-Vitamin D (OYSTER SHELL CALCIUM/D) 500-200 MG-UNIT TABS Take 1 tablet by mouth 2 times daily (with meals)      Cholecalciferol (VITAMIN D3) 125 MCG (5000 UT) CAPS       cyanocobalamin 250 MCG tablet Take 250 mcg by mouth daily      Melatonin 10 MG SUBL       methocarbamol (ROBAXIN) 500 MG tablet Take 500 mg by mouth 3 times daily as needed      albuterol sulfate  (90 Base) MCG/ACT inhaler Inhale 2 puffs into the lungs every 6 hours as needed for Wheezing 18 g 5    atorvastatin (LIPITOR) 40 MG tablet Take 1 tablet by mouth daily 90 tablet 1    DULoxetine (CYMBALTA) 60 MG extended release capsule Take 1 capsule by mouth daily 90 capsule 1    gabapentin (NEURONTIN) 800 MG tablet Take 1 tablet by mouth 2 times daily for 90 days.  180 tablet 1    levothyroxine (SYNTHROID) 50 MCG tablet TAKE ONE TABLET BY MOUTH ON MONDAY THROUGH FRIDAY AND 2 TABLETS BY MOUTH ON SATURDAY AND SUNDAY 90 tablet 1    metFORMIN (GLUCOPHAGE) 500 MG tablet Take 2 tablets by mouth 2 times daily 360 tablet 1  pantoprazole (PROTONIX) 40 MG tablet Take 1 tablet by mouth daily 90 tablet 1    insulin lispro, 1 Unit Dial, (HUMALOG KWIKPEN) 100 UNIT/ML SOPN Use up  To 35 units daily per sliding scale 10 pen 1    cetirizine (ZYRTEC ALLERGY) 10 MG tablet Take 1 tablet by mouth daily       No current facility-administered medications for this visit. Physical Exam  Vitals and nursing note reviewed. Constitutional:       General: She is not in acute distress. Appearance: She is well-developed. HENT:      Head: Normocephalic and atraumatic. Right Ear: Tympanic membrane normal.      Left Ear: Tympanic membrane normal.   Eyes:      General: No scleral icterus. Extraocular Movements: Extraocular movements intact. Neck:      Thyroid: No thyromegaly. Cardiovascular:      Rate and Rhythm: Normal rate and regular rhythm. Heart sounds: No murmur heard. Pulmonary:      Effort: No respiratory distress. Breath sounds: Normal breath sounds. No wheezing or rales. Abdominal:      General: Bowel sounds are normal. There is no distension. Palpations: Abdomen is soft. Tenderness: There is no abdominal tenderness. Musculoskeletal:         General: Normal range of motion. Right lower leg: Edema (tr) present. Left lower leg: Edema (tr) present. Lymphadenopathy:      Cervical: No cervical adenopathy. Skin:     General: Skin is warm and dry. Neurological:      Mental Status: She is alert and oriented to person, place, and time. Cranial Nerves: No cranial nerve deficit. Sensory: No sensory deficit. Coordination: Coordination normal.   Psychiatric:         Behavior: Behavior normal.        100/60 w standing 110/70 w sitting  Sensory exam of the foot is normal, tested with the monofilament. Good pulses, no lesions or ulcers, good peripheral pulses.         On this date  I have spent 40 minutes reviewing previous notes, test results and face to face with the patient discussing the diagnosis and importance of compliance with the treatment plan as well as documenting on the day of the visit. This note was generated completely or in part utilizing Dragon dictation speech recognition software. Occasionally, words are mistranscribed and despite editing, the text may contain inaccuracies due to incorrect word recognition. If further clarification is needed please contact the office at [phone]      An electronic signature was used to authenticate this note.     --Vadim Kramer MD

## 2022-06-02 ENCOUNTER — HOSPITAL ENCOUNTER (OUTPATIENT)
Age: 62
Discharge: HOME | End: 2022-06-02
Payer: MEDICAID

## 2022-06-02 ENCOUNTER — TELEPHONE (OUTPATIENT)
Dept: INTERNAL MEDICINE CLINIC | Age: 62
End: 2022-06-02

## 2022-06-02 ENCOUNTER — HOSPITAL ENCOUNTER (OUTPATIENT)
Dept: GENERAL RADIOLOGY | Age: 62
Discharge: HOME | End: 2022-06-02
Payer: MEDICAID

## 2022-06-02 DIAGNOSIS — R10.84 GENERALIZED ABDOMINAL PAIN: ICD-10-CM

## 2022-06-02 DIAGNOSIS — R06.02 SOB (SHORTNESS OF BREATH): ICD-10-CM

## 2022-06-02 DIAGNOSIS — M54.2 NECK PAIN: ICD-10-CM

## 2022-06-02 DIAGNOSIS — R50.9 FEVER, UNSPECIFIED FEVER CAUSE: ICD-10-CM

## 2022-06-02 PROCEDURE — 74018 RADEX ABDOMEN 1 VIEW: CPT

## 2022-06-02 PROCEDURE — 71046 X-RAY EXAM CHEST 2 VIEWS: CPT

## 2022-06-02 PROCEDURE — 72040 X-RAY EXAM NECK SPINE 2-3 VW: CPT

## 2022-06-02 NOTE — TELEPHONE ENCOUNTER
Did she go to the emergency room yesterday as we discussed? Did she got the x-rays ordered by gastroenterology yesterday evening? What is her temperature now?

## 2022-06-02 NOTE — TELEPHONE ENCOUNTER
Patient daughter is requesting to speak with Dr. Medrano Him regarding concerns she has. She lives with patient, however patient is unaware Saima Ortiz is calling. She states patient has had \"weird behavior\" so she  \"called adult protective services and they instructed her to call Dr. Medrano Him and explain what's going on. \"  No additional information was provided. Please contact her at phone # provided. [phone]

## 2022-06-02 NOTE — TELEPHONE ENCOUNTER
Pt states she did not go to the ER because she laid down and got comfortable and did not want to move. She states she took tylenol and her fever came down. She state she is getting the xrays today.  He temp is 99.5 right now

## 2022-06-02 NOTE — TELEPHONE ENCOUNTER
Patient called stating that her temperature went up to 101.7 last night and woke up in sweat around 1 am.
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
## 2022-06-04 PROBLEM — Z01.810 PREOP CARDIOVASCULAR EXAM: Status: RESOLVED | Noted: 2022-05-05 | Resolved: 2022-06-04

## 2022-06-06 ENCOUNTER — CARE COORDINATION (OUTPATIENT)
Dept: CARE COORDINATION | Age: 62
End: 2022-06-06

## 2022-06-06 ENCOUNTER — OFFICE VISIT (OUTPATIENT)
Dept: PSYCHIATRY | Age: 62
End: 2022-06-06
Payer: MEDICAID

## 2022-06-06 VITALS
HEART RATE: 76 BPM | RESPIRATION RATE: 12 BRPM | SYSTOLIC BLOOD PRESSURE: 130 MMHG | WEIGHT: 171.6 LBS | BODY MASS INDEX: 27.7 KG/M2 | DIASTOLIC BLOOD PRESSURE: 76 MMHG

## 2022-06-06 DIAGNOSIS — F41.1 GAD (GENERALIZED ANXIETY DISORDER): ICD-10-CM

## 2022-06-06 DIAGNOSIS — F33.1 MODERATE EPISODE OF RECURRENT MAJOR DEPRESSIVE DISORDER (HCC): Primary | ICD-10-CM

## 2022-06-06 DIAGNOSIS — F43.23 ADJUSTMENT REACTION WITH ANXIETY AND DEPRESSION: ICD-10-CM

## 2022-06-06 PROBLEM — E53.8 B12 DEFICIENCY: Status: RESOLVED | Noted: 2022-04-06 | Resolved: 2022-06-06

## 2022-06-06 PROCEDURE — 99204 OFFICE O/P NEW MOD 45 MIN: CPT | Performed by: STUDENT IN AN ORGANIZED HEALTH CARE EDUCATION/TRAINING PROGRAM

## 2022-06-06 PROCEDURE — 1036F TOBACCO NON-USER: CPT | Performed by: STUDENT IN AN ORGANIZED HEALTH CARE EDUCATION/TRAINING PROGRAM

## 2022-06-06 PROCEDURE — G8419 CALC BMI OUT NRM PARAM NOF/U: HCPCS | Performed by: STUDENT IN AN ORGANIZED HEALTH CARE EDUCATION/TRAINING PROGRAM

## 2022-06-06 PROCEDURE — G8428 CUR MEDS NOT DOCUMENT: HCPCS | Performed by: STUDENT IN AN ORGANIZED HEALTH CARE EDUCATION/TRAINING PROGRAM

## 2022-06-06 PROCEDURE — 3017F COLORECTAL CA SCREEN DOC REV: CPT | Performed by: STUDENT IN AN ORGANIZED HEALTH CARE EDUCATION/TRAINING PROGRAM

## 2022-06-06 RX ORDER — DULOXETIN HYDROCHLORIDE 30 MG/1
30 CAPSULE, DELAYED RELEASE ORAL DAILY
Qty: 30 CAPSULE | Refills: 2 | Status: SHIPPED | OUTPATIENT
Start: 2022-06-06 | End: 2022-08-15 | Stop reason: SDUPTHER

## 2022-06-06 ASSESSMENT — ENCOUNTER SYMPTOMS
EYES NEGATIVE: 1
ALLERGIC/IMMUNOLOGIC NEGATIVE: 1
GASTROINTESTINAL NEGATIVE: 1
RESPIRATORY NEGATIVE: 1

## 2022-06-06 ASSESSMENT — PATIENT HEALTH QUESTIONNAIRE - PHQ9
1. LITTLE INTEREST OR PLEASURE IN DOING THINGS: 2
SUM OF ALL RESPONSES TO PHQ QUESTIONS 1-9: 22
3. TROUBLE FALLING OR STAYING ASLEEP: 3
7. TROUBLE CONCENTRATING ON THINGS, SUCH AS READING THE NEWSPAPER OR WATCHING TELEVISION: 3
5. POOR APPETITE OR OVEREATING: 2
6. FEELING BAD ABOUT YOURSELF - OR THAT YOU ARE A FAILURE OR HAVE LET YOURSELF OR YOUR FAMILY DOWN: 3
10. IF YOU CHECKED OFF ANY PROBLEMS, HOW DIFFICULT HAVE THESE PROBLEMS MADE IT FOR YOU TO DO YOUR WORK, TAKE CARE OF THINGS AT HOME, OR GET ALONG WITH OTHER PEOPLE: 2
2. FEELING DOWN, DEPRESSED OR HOPELESS: 2
8. MOVING OR SPEAKING SO SLOWLY THAT OTHER PEOPLE COULD HAVE NOTICED. OR THE OPPOSITE, BEING SO FIGETY OR RESTLESS THAT YOU HAVE BEEN MOVING AROUND A LOT MORE THAN USUAL: 2
SUM OF ALL RESPONSES TO PHQ QUESTIONS 1-9: 22
SUM OF ALL RESPONSES TO PHQ9 QUESTIONS 1 & 2: 4
4. FEELING TIRED OR HAVING LITTLE ENERGY: 3
SUM OF ALL RESPONSES TO PHQ QUESTIONS 1-9: 20
9. THOUGHTS THAT YOU WOULD BE BETTER OFF DEAD, OR OF HURTING YOURSELF: 2
SUM OF ALL RESPONSES TO PHQ QUESTIONS 1-9: 22

## 2022-06-06 ASSESSMENT — ANXIETY QUESTIONNAIRES
6. BECOMING EASILY ANNOYED OR IRRITABLE: 3
4. TROUBLE RELAXING: 3
IF YOU CHECKED OFF ANY PROBLEMS ON THIS QUESTIONNAIRE, HOW DIFFICULT HAVE THESE PROBLEMS MADE IT FOR YOU TO DO YOUR WORK, TAKE CARE OF THINGS AT HOME, OR GET ALONG WITH OTHER PEOPLE: VERY DIFFICULT
3. WORRYING TOO MUCH ABOUT DIFFERENT THINGS: 2
GAD7 TOTAL SCORE: 17
7. FEELING AFRAID AS IF SOMETHING AWFUL MIGHT HAPPEN: 3
1. FEELING NERVOUS, ANXIOUS, OR ON EDGE: 2
2. NOT BEING ABLE TO STOP OR CONTROL WORRYING: 2
5. BEING SO RESTLESS THAT IT IS HARD TO SIT STILL: 2

## 2022-06-06 NOTE — CARE COORDINATION
Referral from PCP re diabetes supplies  Pt received letter from insurance does not cover Freestyle Precision Chad test strips, however pt picked up freestyle precision chad glucometer and 25 test strips     she also has Freestyle Mela CGM and the supplies. Call to [employer] re above,  pharmacist ran test strips, now requiring PA  It appears the insurance has changed the formulary brand-- now is One Touch and True Metrix    Freestyle precision Chad strips have a cash price of $8 for 25 strips. Plan   pharmacy will send over PA for Freestyle Precision Chad   If not covered and pt does not want to pay OOP, then a new prescription for One Touch or True Metrix glucometer and test strips and lancets will need to be sent to pharmacy. Call to patient re above, she will check w insurance re Freestyle mela too. Aware PA being sent to office re Freestyle Precision chad but likely will not be covered any longer.  Pt is going to file an appeal as well  Pt has this ACM nname and number to call with questions/ concerns

## 2022-06-07 ENCOUNTER — TELEPHONE (OUTPATIENT)
Dept: ADMINISTRATIVE | Age: 62
End: 2022-06-07

## 2022-06-07 RX ORDER — LANOLIN ALCOHOL/MO/W.PET/CERES
400 CREAM (GRAM) TOPICAL 2 TIMES DAILY
Qty: 60 TABLET | Refills: 3 | Status: SHIPPED | OUTPATIENT
Start: 2022-06-07 | End: 2022-10-14

## 2022-06-08 NOTE — TELEPHONE ENCOUNTER
RECEIVED DENIAL: LETTER ATTACHED. If this requires a response please respond to the pool. [address]). Please advise patient thank you. patient biba [facility] s[facility]p fall out of wheel chair, patient finger stick at facility was 49 patient unresponsive, patient more alert at this time, confused patient suzette from [facility] s/p fall out of wheel chair, patient finger stick at facility was 49 patient unresponsive at [place]. patient received 2 tubes of oral glucose, EMS states that patient did eat dinner tonight, patient more alert at this time, confused at this time. Dr Guerrero at bedside for evaluation

## 2022-06-14 ENCOUNTER — HOSPITAL ENCOUNTER (OUTPATIENT)
Dept: SLEEP CENTER | Age: 62
Discharge: HOME | End: 2022-06-14
Payer: MEDICAID

## 2022-06-14 DIAGNOSIS — G47.33 OSA (OBSTRUCTIVE SLEEP APNEA): ICD-10-CM

## 2022-06-14 PROCEDURE — 95811 POLYSOM 6/>YRS CPAP 4/> PARM: CPT | Performed by: PSYCHIATRY & NEUROLOGY

## 2022-06-14 PROCEDURE — 95811 POLYSOM 6/>YRS CPAP 4/> PARM: CPT

## 2022-06-15 RX ORDER — LANCETS 30 GAUGE
1 EACH MISCELLANEOUS 2 TIMES DAILY
Qty: 100 EACH | Refills: 5 | Status: SHIPPED | OUTPATIENT
Start: 2022-06-15

## 2022-06-15 RX ORDER — BLOOD-GLUCOSE METER
1 EACH MISCELLANEOUS DAILY
Qty: 1 KIT | Refills: 0 | Status: SHIPPED | OUTPATIENT
Start: 2022-06-15

## 2022-06-15 RX ORDER — GLUCOSAMINE HCL/CHONDROITIN SU 500-400 MG
CAPSULE ORAL
Qty: 100 STRIP | Refills: 5 | Status: SHIPPED | OUTPATIENT
Start: 2022-06-15

## 2022-06-15 NOTE — PROGRESS NOTES
Shanita Braga         : 1960  [] [facility]     [] [facility]      [] Valerie     []Violet    [] [facility]  [x] [facility]   [] Other:  Diagnosis: [x] CHRIS (G47.33) [] CSA (G47.31) [] Apnea (G47.30)   Length of Need: [] 12 Months [x] 99 Months [] Other:    Machine (KIRK!): [x] Emma [x] ResMed AirSense     Auto [] Other:     [x]  CPAP () [] Bilevel ()   Mode: [x] Auto [] Spontaneous    Mode: [] Auto [] Spontaneous           12 cm                 Comfort Settings:   - Ramp Pressure: 5 cmH2O                                        - Ramp time: 15 min                                     -  Flex/EPR - 3 full time                                    - For ResMed Bilevel (TiMax-4 sec   TiMin- 0.2 sec)     Humidifier: [x] Heated ()        [x] Water chamber replacement ()/ 1 per 6 months        Mask:  Please always start with the mask the patient used during the titraion   [x] Nasal () /1 per 3 months [x] Full Face () /1 per 3 months   [x] Patient choice -Size and fit mask [x] Patient Choice - Size and fit mask   [] Dispense:   small/medium Wisp nasal [] Dispense:    [x] Headgear () / 1 per 3 months [x] Headgear () / 1 per 3 months   [x] Replacement Nasal Cushion ()/2 per month [x] Interface Replacement ()/1 per month   [x] Replacement Nasal Pillows ()/2 per month         Tubing: [x] Heated ()/1 per 3 months    [] Standard ()/1 per 3 months [] Other:           Filters: [x] Non-disposable ()/1 per 6 months     [x] Ultra-Fine, Disposable ()/2 per month        Miscellaneous: [x] Chin Strap ()/ 1 per 6 months [] O2 bleed-in:       LPM   [] Oximetry on CPAP/Bilevel []  Other:          Start Order Date: 06/15/22    MEDICAL JUSTIFICATION:  I, the undersigned, certify that the above prescribed supplies are medically necessary for this patients wellbeing.   In my opinion, the supplies are both reasonable and necessary in reference to accepted standards of medicalpractice in treatment of this patients condition.     Clementine Molina MD      NPI: [id number]       Order Signed Date: 06/15/22    Electronically signed by Clementine Molina MD on 6/15/2022 at 11:59 AM

## 2022-06-15 NOTE — TELEPHONE ENCOUNTER
[facility] formulary glucometer is One Touch or Juan Metrix  Need to send new order for One touch glucometer, lancets, test strips.     Pt is filing appeal w insurance

## 2022-06-17 ENCOUNTER — TELEPHONE (OUTPATIENT)
Dept: PULMONOLOGY | Age: 62
End: 2022-06-17

## 2022-06-17 DIAGNOSIS — E03.8 OTHER SPECIFIED HYPOTHYROIDISM: ICD-10-CM

## 2022-06-17 RX ORDER — LEVOTHYROXINE SODIUM 0.05 MG/1
TABLET ORAL
Qty: 90 TABLET | Refills: 1 | Status: SHIPPED | OUTPATIENT
Start: 2022-06-17 | End: 2022-09-07

## 2022-06-17 NOTE — TELEPHONE ENCOUNTER
Sleep study showed mild CHRIS. AHI was 8.6  per hr. And O2 Desaturations to 88%.     adequate control of events at a CPAP pressure 12cm    Pt l/m   wcb  Needs DME

## 2022-06-20 ENCOUNTER — PROCEDURE VISIT (OUTPATIENT)
Dept: AUDIOLOGY | Age: 62
End: 2022-06-20
Payer: MEDICAID

## 2022-06-20 ENCOUNTER — OFFICE VISIT (OUTPATIENT)
Dept: ENT CLINIC | Age: 62
End: 2022-06-20
Payer: MEDICAID

## 2022-06-20 VITALS — BODY MASS INDEX: 27.48 KG/M2 | WEIGHT: 171 LBS | HEIGHT: 66 IN

## 2022-06-20 DIAGNOSIS — H93.13 TINNITUS OF BOTH EARS: Primary | ICD-10-CM

## 2022-06-20 DIAGNOSIS — H90.3 SENSORINEURAL HEARING LOSS, BILATERAL: Primary | ICD-10-CM

## 2022-06-20 DIAGNOSIS — R42 DIZZINESS AND GIDDINESS: ICD-10-CM

## 2022-06-20 DIAGNOSIS — H90.3 SENSORINEURAL HEARING LOSS (SNHL) OF BOTH EARS: ICD-10-CM

## 2022-06-20 DIAGNOSIS — H93.11 TINNITUS, RIGHT EAR: ICD-10-CM

## 2022-06-20 PROCEDURE — 1036F TOBACCO NON-USER: CPT | Performed by: OTOLARYNGOLOGY

## 2022-06-20 PROCEDURE — G8419 CALC BMI OUT NRM PARAM NOF/U: HCPCS | Performed by: OTOLARYNGOLOGY

## 2022-06-20 PROCEDURE — 3017F COLORECTAL CA SCREEN DOC REV: CPT | Performed by: OTOLARYNGOLOGY

## 2022-06-20 PROCEDURE — 92557 COMPREHENSIVE HEARING TEST: CPT | Performed by: AUDIOLOGIST

## 2022-06-20 PROCEDURE — G8427 DOCREV CUR MEDS BY ELIG CLIN: HCPCS | Performed by: OTOLARYNGOLOGY

## 2022-06-20 PROCEDURE — 92567 TYMPANOMETRY: CPT | Performed by: AUDIOLOGIST

## 2022-06-20 PROCEDURE — 99203 OFFICE O/P NEW LOW 30 MIN: CPT | Performed by: OTOLARYNGOLOGY

## 2022-06-20 ASSESSMENT — ENCOUNTER SYMPTOMS
WHEEZING: 0
SHORTNESS OF BREATH: 0
DIARRHEA: 0
EYE PAIN: 0
COUGH: 0
FACIAL SWELLING: 0
BLOOD IN STOOL: 0
SINUS PRESSURE: 0
CHEST TIGHTNESS: 0
VOICE CHANGE: 0
RHINORRHEA: 0
EYE DISCHARGE: 0
SINUS PAIN: 0
SORE THROAT: 0
NAUSEA: 0
CHOKING: 0
STRIDOR: 0
CONSTIPATION: 0
VOMITING: 0
BACK PAIN: 0
TROUBLE SWALLOWING: 0
COLOR CHANGE: 0
APNEA: 0

## 2022-06-20 NOTE — LETTER
[facility]  [address]  Phone: [phone]  Fax: [phone]    Aisha Hudson MD    June 20, 2022     Rayna Tirado, [address] [place] [address]    Patient: Leigh Mejia   MR Number: [id number]   YOB: 1960   Date of Visit: 6/20/2022       Dear Rayna Tirado:    Thank you for referring Leigh Mejia to me for evaluation/treatment. Below are the relevant portions of my assessment and plan of care. Diagnosis Orders   1. Tinnitus of both ears     2. Sensorineural hearing loss (SNHL) of both ears           Given pamphlet on tinnitus and discussed etiologies and prognosis. Reviewed masking techniques (fan at night, noise machine, classical music during the day); as well as, indications for amplification. Avoidance of sodium, caffeine, nicotine, alcohol, high dose aspirin, aspirin products, opioids, furosemide, and theophylline; as well as, other potential foods and medications. Smart Phone Aps: Hearing & Tinnitus, White Noise, etc    No evidence of acute loss from procedure as bilateral and symmetric. Discussed imbalance. Recommend balance therapy. Cannot perform VNG secondary to neurontin and cannot stop. Follow up in 1 year for repeat audio. Audiology visit only. If you have questions, please do not hesitate to call me. I look forward to following Buzz Michaels along with you.     Sincerely,      Aisha Hudson MD
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
## 2022-06-20 NOTE — PATIENT INSTRUCTIONS
Good Communication Strategies    Communication can be challenging for anyone, but can be especially difficult for those with some degree of hearing loss. While we may not be able to control every factor that may lead to difficulty with communication, there are Good Communication Strategies that we can all use in our day-to-day lives. Communication takes both parties working together for it to be successful. Tips as a Listener:   1. Control your environment. It is important to limit the amount of background noise in the room when possible. You should also consider having a good light source in the room to best see the other person. 2. Ask for clarification. Instead of saying \"What?\", you can use parts of what you heard to make a new question. For example, if you heard the word \"Thursday\" but not the rest of the week, you may ask \"What was that about Thursday? \" or \"What did you want to do Thursday? \". This shows the person talking that you are listening and will help them better explain what they are saying. 3. Be an advocate for yourself. If you are hearing but not understanding, tell the other person \"I can hear you, but I need you to slow down when you speak. \"  Or if someone is facing the other direction, say \"I cannot hear you when you are not looking at me when we talk. \"       Tips as a Talker:   - Sit or stand 3 to 6 feet away to maximize audibility         -- It is unrealistic to believe someone else will fully hear your message if you are speaking from across the room or in a different room in the house   - Stay at eye level to help with visual cues   - Make sure you have the persons attention before speaking   - Use facial expressions and gestures to accentuate your message   - Raise your voice slightly (do not scream)   - Speak slowly and distinctly   - Use short, simple sentences   - Rephrase your words if the person is having a hard time understanding you    - To avoid distortion, dont speak If it goes on all the time, you may have tinnitus. Tinnitus is usually caused by long-term exposure to loud noise. This damages the nerves in the inner ear. It can occur with all types of hearing loss. It may be a symptom of almost any ear problem. Tinnitus may be caused by a buildup of earwax. Or, it may be caused by ear infections or certain medicines (especially antibiotics or large amounts of aspirin). You can also hear noises in your ears because of an injury to the ears, drinking too much alcohol or caffeine, or a medical condition. Other conditions may also contribute to tinnitus, including: head and neck trauma, temporomandibular joint disorder (TMJ), sinus pressure and barometric trauma, traumatic brain injury, metabolic disorders, autoimmune disorders, stress, and high blood pressure. You may need tests to evaluate your hearing and to find causes of long-lasting tinnitus. Your doctor may suggest one or more treatments to help you cope with the tinnitus. You can also do things at home to help reduce symptoms. Follow-up care is a key part of your treatment and safety. Be sure to make and go to all appointments, and call your doctor if you are having problems. It's also a good idea to know your test results and keep a list of the medicines you take. How can you care for yourself at home? · Limit or cut out alcohol, caffeine, and sodium. They can make your symptoms worse. · Do not smoke or use other tobacco products. Nicotine reduces blood flow to the ear and makes tinnitus worse. If you need help quitting, talk to your doctor about stop-smoking programs and medicines. These can increase your chances of quitting for good. · Talk to your doctor about whether to stop taking aspirin and similar products such as ibuprofen or naproxen. · Get exercise often. It can help improve blood flow to the ear. Ways to manage/cope with tinnitus  Some tinnitus may last a long time.  To manage your tinnitus, try to:  · Avoid noises that you think caused your tinnitus. If you can't avoid loud noises, wear earplugs or earmuffs. · Ignore the sound by paying attention to other things. Keeping your brain busy with other tasks or background noise can help your brain not focus on the tinnitus. · Try to not give the tinnitus an emotional reaction. Do your best to ignore the sound and not let it bother you. Relax using biofeedback, meditation, or yoga. Feeling stressed and being tired can make tinnitus worse. · Play music or white noise to help you sleep. Background noise may cover up the noise that you hear in your ears. You can buy a tabletop machine or a device that sits under your pillow to play soothing sounds, like ocean waves. · Smart phones have free apps, such as Whist, Relax Melodies, ReSound Relief, and White Noise Lite. These apps have different types of sounds/noise, some of which you can blend together to find sounds that are most soothing to you. · Hearing aid technology, especially when there is some hearing loss, may help reduce tinnitus symptoms by giving your brain better access to the sounds it is missing. There are some hearing aids with built-in noise generator programs, which may help when amplification alone is not enough. Additional resources may be found through the [employer] at www.basil.org    When should you call for help? Call 911 anytime you think you may need emergency care. For example, call if:    · You have symptoms of a stroke. These may include:  ? Sudden numbness, tingling, weakness, or loss of movement in your face, arm, or leg, especially on only one side of your body. ? Sudden vision changes. ? Sudden trouble speaking. ? Sudden confusion or trouble understanding simple statements. ? Sudden problems with walking or balance. ? A sudden, severe headache that is different from past headaches.     Call your doctor now or seek immediate medical care if:    · You develop other symptoms. These may include hearing loss (or worse hearing loss), balance problems, dizziness, nausea, or vomiting. Watch closely for changes in your health, and be sure to contact your doctor if:    · Your tinnitus moves from both ears to one ear. · Your hearing loss gets worse within 1 day after an ear injury. · Your tinnitus or hearing loss does not get better within 1 week after an ear injury. · Your tinnitus bothers you enough that you want to take medicines to help you cope with it. If you notice changes in your tinnitus and/or your hearing, it is recommended that you have your hearing tested by your audiologist and to follow-up with your physician that manages your hearing loss (such as your ENT or Primary Care doctor). Hearing Loss: Care Instructions  Your Care Instructions      Hearing loss is a sudden or slow decrease in how well you hear. It can range from mild to profound. Permanent hearing loss can occur with aging, and it can happen when you are exposed long-term to loud noise. Examples include listening to loud music, riding motorcycles, or being around other loud machines. Hearing loss can affect your work and home life. It can make you feel lonely or depressed. You may feel that you have lost your independence. But hearing aids and other devices can help you hear better and feel connected to others. Follow-up care is a key part of your treatment and safety. Be sure to make and go to all appointments, and call your doctor if you are having problems. It's also a good idea to know your test results and keep a list of the medicines you take. How can you care for yourself at home? · Avoid loud noises whenever possible. This helps keep your hearing from getting worse. Always wear hearing protection around loud noises. · If appropriate, wear hearing aid(s) as directed.   It is recommended that hearing aids are worn during all waking hours to keep your brain active and give it access to the sounds it is missing. · If you are beginning your process with hearing aid(s), schedule a \"Hearing Aid Evaluation\" with an audiologist to discuss your lifestyle, features of hearing aid technology, and styles of hearing aids available. It is recommended that you contact your insurance company to determine if you have a hearing aid benefit, as this may dictate who you can see for these services. · Have hearing tests as your doctor suggests. They can show whether your hearing has changed. Your hearing aid may need to be adjusted. · Use other assistive devices as needed. These may include:  ? Telephone amplifiers and hearing aids that can connect to a television, stereo, radio, or microphone. ? Devices that use lights or vibrations. These alert you to the doorbell, a ringing telephone, or a baby monitor. ? Television closed-captioning. This shows the words at the bottom of the screen. Most new TVs can do this. ? TTY (text telephone). This lets you type messages back and forth on the telephone instead of talking or listening. These devices are also called TDD. When messages are typed on the keyboard, they are sent over the phone line to a receiving TTY. The message is shown on a monitor. · Use pagers, fax machines, text, and email if it is hard for you to communicate by telephone. · Try to learn a listening technique called speech-reading. It is not lip-reading. You pay attention to people's gestures, expressions, posture, and tone of voice. These clues can help you understand what a person is saying. Face the person you are talking to, and have him or her face you. Make sure the lighting is good. You need to see the other person's face clearly. · Think about counseling if you need help to adjust to your hearing loss. When should you call for help?   Watch closely for changes in your health, and be sure to contact your doctor if:    · You think your hearing is getting worse. · You have new symptoms, such as dizziness or nausea. Dizziness: Care Instructions  Your Care Instructions  Dizziness is the feeling of unsteadiness or fuzziness in your head. It is different than having vertigo, which is a feeling that the room is spinning or that you are moving or falling. It is also different from lightheadedness, which is the feeling that you are about to faint. It can be hard to know what causes dizziness. Some people feel dizzy when they have migraine headaches. Sometimes bouts of flu can make you feel dizzy. Some medical conditions, such as heart problems or high blood pressure, can make you feel dizzy. Many medicines can cause dizziness, including medicines for high blood pressure, pain, or anxiety. If a medicine causes your symptoms, your doctor may recommend that you stop or change the medicine. If it is a problem with your heart, you may need medicine to help your heart work better. If there is no clear reason for your symptoms, your doctor may suggest watching and waiting for a while to see if the dizziness goes away on its own. Follow-up care is a key part of your treatment and safety. Be sure to make and go to all appointments, and call your doctor if you are having problems. It's also a good idea to know your test results and keep a list of the medicines you take. How can you care for yourself at home? · If your doctor recommends or prescribes medicine, take it exactly as directed. Call your doctor if you think you are having a problem with your medicine. · Do not drive while you feel dizzy. · Try to prevent falls. Steps you can take include:  ? Using nonskid mats, adding grab bars near the tub, and using night-lights. ? Clearing your home so that walkways are free of anything you might trip on.  ? Letting family and friends know that you have been feeling dizzy. This will help them know how to help you.     When should you call for help?  Call 911 anytime you think you may need emergency care. For example, call if:    · You passed out (lost consciousness). · You have dizziness along with symptoms of a heart attack. These may include:  ? Chest pain or pressure, or a strange feeling in the chest.  ? Sweating. ? Shortness of breath. ? Nausea or vomiting. ? Pain, pressure, or a strange feeling in the back, neck, jaw, or upper belly or in one or both shoulders or arms. ? Lightheadedness or sudden weakness. ? A fast or irregular heartbeat. · You have symptoms of a stroke. These may include:  ? Sudden numbness, tingling, weakness, or loss of movement in your face, arm, or leg, especially on only one side of your body. ? Sudden vision changes. ? Sudden trouble speaking. ? Sudden confusion or trouble understanding simple statements. ? Sudden problems with walking or balance. ? A sudden, severe headache that is different from past headaches. Call your doctor now or seek immediate medical care if:    · You feel dizzy and have a fever, headache, or ringing in your ears. · You have new or increased nausea and vomiting. · Your dizziness does not go away or comes back. Watch closely for changes in your health, and be sure to contact your doctor if:    · You do not get better as expected. Where can you learn more? Go to https://IIDpeBuyHappy.Bout[employer] Window. org and sign in to your [employer] account. Enter R487 in the [facility] box to learn more about \"Dizziness: Care Instructions. \"     If you do not have an account, please click on the \"Sign Up Now\" link. Current as of: September 23, 2018  Content Version: 11.9  © 4001-5613 [employer], [employer]. Care instructions adapted under license by [facility] ([facility]). If you have questions about a medical condition or this instruction, always ask your healthcare professional. [address] any warranty or liability for your use of this information.

## 2022-06-20 NOTE — Clinical Note
Dr. Sami Rowe,    Please see note from this patient's audiogram.  Please let me know if there is anything further you need.       Pratik Backbone [address] [place]  Audiologist

## 2022-06-20 NOTE — PROGRESS NOTES
Peter Schaefer   1960, 60 y.o. female   [id number]       Referring Provider: Whitney Glasgow MD, PhD  Referral Type: In an order in [address] [place] [address]    Reason for Visit: Evaluation of suspected change in hearing, tinnitus, or balance. ADULT AUDIOLOGIC EVALUATION      Peter Schaefer is a 60 y.o. female seen today, 6/20/2022, for an initial audiologic evaluation. AUDIOLOGIC AND OTHER PERTINENT MEDICAL HISTORY:        Peter Schaefer noted concern for right ear tinnitus and decreased hearing, present for at least a few months with constant ringing, but would come and go for longer, decreased hearing in right ear for about 5 years; dizziness - history of falls within past 5 years, including with injury; history of head trauma - noted subarachnoid hemorrhage in 2003; family history of hearing loss on father's side. Peter Schaefer denied otalgia, aural fullness, otorrhea, history of occupational/recreational noise exposure, and history of ear surgery. IMPRESSIONS:       Today's results are consistent with bilateral sensorineural hearing loss with excellent word recognition bilaterally; right ear with reduced TM mobility and left ear with normal middle ear function. Hearing loss is significant enough to result in difficulty understanding speech in at least some listening environments, such as those with background noise. Discussed tinnitus management strategies; follow medical recommendations from Dr. Nam More. ASSESSMENT AND FINDINGS:       Otoscopy revealed: Non-occlusive deep cerumen in right ear canal; clear ear canal left ear      RIGHT EAR:  Hearing Sensitivity: Borderline-normal to moderate sensorineural hearing loss. Speech Recognition Threshold: 30 dBHL  Word Recognition: Excellent (%), based on NU-6 25-word list at 55 dBHL using recorded speech stimuli. Tympanometry: Normal peak pressure with low compliance, Type As tympanogram, consistent with reduced tympanic membrane mobility.       LEFT EAR:  Hearing Sensitivity: Within normal limits to moderate sensorineural hearing loss. Speech Recognition Threshold: 25 dBHL  Word Recognition: Excellent (%), based on NU-6 25-word list at 50 dBHL using recorded speech stimuli. Tympanometry: Normal peak pressure and compliance, Type A tympanogram, consistent with normal middle ear function. Reliability: Good  Transducer: Inserts    See scanned audiogram dated 6/20/2022 for results. PATIENT EDUCATION:       The following items were discussed with the patient:   - Good Communication Strategies  - Hearing Loss and Hearing Aids  - Tinnitus Management Strategies    - Dizziness    Educational information was shared in the After Visit Summary. RECOMMENDATIONS:                                                                                                                                                                                                                                                                      The following items are recommended based on patient report and results from today's appointment:  - Continue medical follow-up with Lexa Leung MD, PhD.  - Montey Beery hearing as medically indicated and/or sooner if a change in hearing is noted. - Briefly discussed future considerations for amplification. If desired, schedule a Hearing Aid Evaluation (HAE) appointment to discuss hearing aid options. - Utilize \"Good Communication Strategies\" as discussed to assist in speech understanding with communication partners. - Maintain a sound enriched environment to assist in the management of tinnitus symptoms. - If medically indicated, consider vestibular evaluation to further investigate symptoms of dizziness.          [facility] [place], [address]  Audiologist      Chart CC'd to: Lexa Leung MD, PhD      Degree of   Hearing Sensitivity dB Range   Within Normal Limits (WNL) 0 - 20 Mild 20 - 40   Moderate 40 - 55   Moderately-Severe 55 - 70   Severe 70 - 90   Profound 90 +

## 2022-06-21 ENCOUNTER — HOSPITAL ENCOUNTER (OUTPATIENT)
Dept: PHYSICAL THERAPY | Age: 62
Setting detail: THERAPIES SERIES
Discharge: HOME | End: 2022-06-21
Payer: MEDICAID

## 2022-06-21 PROCEDURE — 97162 PT EVAL MOD COMPLEX 30 MIN: CPT

## 2022-06-21 PROCEDURE — 97110 THERAPEUTIC EXERCISES: CPT

## 2022-06-21 NOTE — PLAN OF CARE
[facility]patient [facility]  [address] [place] [address], BRISEYDA Cano 50, [address]  Phone: [phone]   Fax: [phone]                                                       Hansa Dai    Dear Dr. Henrry Armstrong MD    ,    We had the pleasure of evaluating the following patient for physical therapy services at [facility] ([facility]). A summary of our findings can be found in the initial assessment below. This includes our plan of care. If you have any questions or concerns regarding these findings, please do not hesitate to contact me at the office phone number checked above. Thank you for the referral.       Physician Signature:_______________________________Date:__________________  By signing above (or electronic signature), therapist's plan is approved by physician      Patient: Shanice Sales   : 1960   MRN: [id number]  Referring Physician:   Henrry Armstrong MD        Evaluation Date: 2022      Medical Diagnosis Information:  Diagnosis: Neck pain (M54.2)   Treatment Diagnosis: Neck pain M54.2                                         Insurance information: PT Insurance Information: [facility] [place], [place] required     Precautions/ Contra-indications: asthma, chronic ischemic heart disease, CAD - stent placed, CVA, DM, diabetic polyneuropathy, HTN, Hx leukemia, OA, anxiety, depression, AV block, osteoporosis, PSVT, fibromyalgia, dizziness/vertigo - orthostatic hypotension, R radial head fx sx, balance issues, memory problems  Latex Allergy:  [x]NO      []YES  Preferred Language for Healthcare:   [x]English       []other:  C-SSRS Triggered by Intake questionnaire (Past 2 wk assessment):   [] No, Questionnaire did not trigger screening.    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 Pain to center of posterior neck and goes up into head and gets awful HAs and also has had bad balance issues. Pain also goes into R shoulder blade, not down arm. No falls in the last month but has had falls beginning in 2015, no specific cause other than at one time iron was low, was treated and then no issues with iron since but still with balance issues. Pt is R hand dominant. Pain initially started out in that it would come and go but is now constant which began about 4 years ago, no specific cause other than more tension. More problems with memory in the last couple of years - went to a neuropsychologist.  Previously diagnosed with R TOS ~ 40 years ago - no treatments, and R carpal tunnel - had injection which helped.             Pain       Patient reports pain is 4/10 pain at present, and 9/10 pain at its worst.  Pain increases with: driving, sleeping, reaching, OH movements, lifting, self care, position changes, standing (ankles swell), stairs limiting household chores and social activities        Decreases with: massager, heat    Pain description:  Achy, sore, constant  Paresthesias: R pinky and ring finger gets N/T from time to time  Pt. reports pain with coughing, sneezing and laughing:   [x]Yes   []No    []NA    Imaging: x-ray: Degenerative spondylosis and intervertebral osteochondrosis at C5-C6 and C6-C7      Current Functional Limitations:   [x]Yes   []No  Functional Complaints: driving, sleeping, reaching, OH movements, lifting, self care, position changes, standing (ankles swell), stairs limiting household chores and social activities          PLOF:   [x]No functional limitations prior to 6-7 years ago and prior to 4 years ago pain only would come and go   []Pre-exisiting limitations:  Pt's sleep is affected?    [x]Yes   []No      Social support/Environment:    Family/caregiver support:   []Yes   [x]No  Daughter lives upstairs - daughter does laundry    Home Environment:   []1 story   []>2 story   []MING   []No rail   []R handrail    []L handrail    Bathroom Environment:   []Walk in shower   []Tub   []Tub/shower combo     []Grab bars   []Shower seat   []Modified toilet   []Hand held shower head    Equipment:    []Rolling walker   []Standard walker   []Rollator   []Vinnie-walker  []Wheelchair   []Quad cane   []Straight cane  []Bedside commode  []Hospital bed  Other:          Relevant Medical History: asthma, chronic ischemic heart disease, CAD - stent placed, CVA, DM, diabetic polyneuropathy, HTN, Hx leukemia, OA, anxiety, depression, AV block, osteoporosis, PSVT, fibromyalgia, dizziness/vertigo - orthostatic hypotension, R radial head fx sx, balance issues, memory problems      Co-morbidities/Complexities (which will affect course of rehabilitation):  []None           Arthritic conditions   []Rheumatoid arthritis (M05.9)  [x]Osteoarthritis (M19.91)   Cardiovascular conditions   [x]Hypertension (I10)  [x]Hyperlipidemia (E78.5)  []Angina pectoris (I20)  []Atherosclerosis (I70)   Musculoskeletal conditions   []Disc pathology   []Congenital spine pathologies   []Prior surgical intervention  [x]Osteoporosis (M81.8)  []Osteopenia (M85.8)   Endocrine conditions   [x]Hypothyroid (E03.9)  []Hyperthyroid Gastrointestinal conditions   []Constipation (K83.78)   Metabolic conditions   []Morbid obesity (E66.01)  [x]Diabetes type 1(E10.65) or 2 (E11.65)   [x]Neuropathy (G60.9)     Pulmonary conditions   [x]Asthma (J45)  []Coughing   []COPD (J44.9)   Psychological Disorders  [x]Anxiety (F41.9)  [x]Depression (F32.9)   []Other:   [x]Other: chronic ischemic heart disease, CAD - stent placed, CVA, Hx leukemia, AV block, PSVT, fibromyalgia, dizziness/vertigo - orthostatic hypotension, R radial head fx sx, balance issues, memory problems            Occupation/School: semi-retired, 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 with standing    Sports/Hobbies/Recreational Activities: reading, sewing, gardening      OBJECTIVE:     Functional Scale/Score: FOTO neck = 44    Gait/Steps/Balance       []Gait WNL                           []Deviations on a level linoleum surface include:      Balance tests      Hautard's test: [x]NT []Neg  []Pos with R rot  []Pos with L rot  []Pos with ext      Posture: []WNL  [x]Forward head/shoulder  []Forward flexed trunk   []Pronounced CT junction    []Increased thoracic kyphosis   []Scoliosis  []Scapular winging  []Decreased WB on   []R   []L     []Other:       Range of Motion  []All [facility]  [x]Cervical Spine   []Thoracic Spine     [x]All NT except as marked below  ROM Deficits Identified             *Denotes pain AROM               PROM               MMT/Resisted Tests   Flexion 75% tightness     Extension 10% stiffness     Sidebending Left 50% tightness     Sidebending Right 50% tightness     Rotation Left 50% tightness     Rotation Right 50% grinding and tightness         UE Range of Motion/Strength Testing      [x]All ROM NT except as marked below   [x]All strength NT except as marked below    [x]All myotomes NT except as marked below    Range Tested MMT/ Resisted PROM AROM Comments   *denotes pain Left Right Left Right Left Right    Cervical Flexion C1-2          Cervical Sidebend  C3          Shoulder Shrug  C4          Shoulder Flexion 4+/5 4-/5*   WFL WFL - sore on R shoulder blade area Tight across back   Shoulder Extension          Shoulder Abduction  C5 5/5 4/5*   [facility] WFL*    Shoulder Adduction           Shoulder IR 5/5 5/5   limited 25% Limited 25%    Shoulder ER 4-/5 4-/5*   WFL WFL*    Elbow Flexion  C6 5/5 4-/5*        Elbow Extension C7 5/5 4-/5*        Pronation          Supination          Wrist Flexion C7          Wrist Extension C6 Radial Deviation          Ulnar Deviation                     Thumb Ext C8          Intrinsics T1              Scapular Strength    []All WFL (5/5) except as marked below   [x]NT   Scapula Left Right   Upper Trapezius     Middle Trapezius     Lower Trapezius     Rhomboid     Serratus Anterior       Latissimus Dorsi         Deep Tendon Reflexes   []All reflexes WNL (2+)    [x]NT   Abnormal Reflex Findings Left Right   Biceps (C5,6)     Brachioradialis (C6)     Triceps (C7)     Abductor Digiti Minimi (C8,T1)     Quadriceps (L3,4)     Achilles (S1,2)     Ankle clonus     Shane's reflex      Babinski's reflex         Dermatomal Sensation  []All dermatomes WNL for light touch except as marked below   [x]All NT except as marked below  Abnormal Dermatome Findings Left Right   Posterior Head (C2)     Posterior Neck (C3)     Supraclavicular (C4)     Lateral Upper Arm (C5)     Lateral Forearm/1st(C6)     3rd digit (C7)     5th digit (C8)      Medial Arm (T1)        Flexibility   [x]All NT except as marked below  Muscle Abnormal Findings   Pectoralis Minor []WNL   []Decreased R   []Decreased L      Levator Scapula []WNL   []Decreased R   []Decreased L      Scalenes []WNL   []Decreased R   []Decreased L      Upper Trapezius  []WNL   []Decreased R   []Decreased L      Suboccipitals  []WNL   []Decreased R   []Decreased L      Other:  []WNL   []Decreased R   []Decreased L        Special Tests- cervical/thoracic seated   [x]All NT except as marked below  Special Test Abnormal Findings   [place] c-spine rules  []Neg   []Pos   []RAFY [place] []Neg   []Pos      Vertebral Artery/Positional Provocative Testing []Neg   []Pos R   []Pos L      Spurling's/Foraminal []Neg   []Pos R   []Pos L      Distraction []Relief noted   []No relief noted      Compression  []Neg   []Pos      Elevated Arm Stress Test (EAST)  []Neg   []Pos R   []Pos L      Thoracic Outlet   Other:  []Neg   []Pos R   []Pos L      Other:  []Neg   []Pos R []Pos L      Other:  []Neg   []Pos R   []Pos L          Special Tests-supine   [x]All NT except as marked below  Special Test Abnormal Findings   Alar ligament/ C2 spinous kick test []Neg   []Pos R   []Pos L      Vertebral artery/Positional provocative testing []Neg   []Pos R   []Pos L      Modified shear test  []Neg   []Pos R   []Pos L      Median nerve tension test []Neg   []Pos R   []Pos L      Radial nerve tension test []Neg   []Pos R   []Pos L      Ulnar nerve tension test  []Neg   []Pos R   []Pos L      Other []Neg   []Pos R   []Pos L      Other  []Neg   []Pos R   []Pos L        Palpation     Patient reported tenderness with palpation:  []Yes   []No   [x]NT  Location:  NA    Bandages/Dressings/Incisions: NA                       [x] Patient history, allergies, meds reviewed. Medical chart reviewed. See intake form. Review Of Systems (ROS):  [x]Performed Review of systems (Integumentary, CardioPulmonary, Neurological) by intake and observation. Intake form has been scanned into medical record. Patient has been instructed to contact their primary care physician regarding ROS issues if not already being addressed at this time. Barriers to/and or personal factors that will affect rehab potential:              []Age  []Sex    []Smoker              []Motivation/Lack of Motivation                        [x]Co-Morbidities              []Cognitive Function, education/learning barriers              []Environmental, home barriers              []profession/work barriers  []past PT/medical experience  [x]other: duration of symptoms  Justification:     Falls Risk Assessment (30 days):   [x] Falls Risk assessed and no intervention required. [] Falls Risk assessed and Patient requires intervention due to being higher risk   TUG score (>12s at risk):     [] Falls education provided, including:         ASSESSMENT: Pt is 60 Y. O female, presenting with c/o neck pain and HA.    Assessment reveals deficits in strength, ROM, alignment as well as increased pain limiting driving, sleeping, reaching, OH movements, lifting, self care, position changes, standing (ankles swell), stairs limiting household chores and social activities. Pt will benefit from cont PT to address these deficits and promote return to highest level of functional independence. Functional Impairments:     []Noted cervical/thoracic/Glenohumeral joint hypomobility  [x]Decreased cervical/UE functional ROM  []Decreased Deep Cervical Flexor control or ability to hold head up  [x]Decreased Rotator Cuff/scapular/core strength and neuromuscular control   [x]Decreased Upper Extremity functional strength     []Noted cervical/thoracic/GHJ joint hypermobility  []Abnormal reflexes/sensation/myotomal/dermatomal deficits  [x]Noted Headache pain aggravated by neck movements with/without dizziness    Functional Activity Limitations (from functional questionnaire and intake)   [x]Reduced tolerance to prolonged functional positions  [x]Reduced tolerance to changes of positions or transfers between positions  [x]Reduced ability to maintain good posture and demonstrate good body mechanics with sitting, bending, and lifting  [x]Reduced ability to perform lifting, reaching, carrying tasks    [x]Reduced ability to sleep   []Reduced tolerance to driving  []Reduced tolerance to computer work  []Reduced ability to concentrate    []Reduced ability to tolerate any impact through UE or spine  []Reduced ability to ambulate prolonged functional periods/distances     Participation Restrictions   [x]Reduced participation in self care activities   [x]Reduced participation in home management activities   []Reduced participation in work activities   [x]Reduced participation in social activities. []Reduced participation in sport/recreational activities.     Classification:   [x]signs/symptoms consistent with neck pain with mobility deficits    []signs/symptoms consistent with neck pain with movement coordinated impairments     []signs/symptoms consistent with neck pain with radiating pain   [x]signs/symptoms consistent with neck pain with headaches (cervicogenic)   []signs/symptoms consistent with nerve root involvement including myotome & dermatome dysfunction    []sign/symptoms consistent with facet dysfunction of cervical and thoracic spine     []signs/symptoms consistent with discogenic cervical pain  []signs/symptoms consistent with rib dysfunction  [x]signs/symptoms consistent with postural dysfunction    []signs/symptoms consistent with shoulder pathology   []signs/symptoms consistent with post-surgical status including decreased ROM, strength and function.   []signs/symptoms consistent suggesting central cord compression/UMN syndromes  []signs/symptoms consistent with pathology which may benefit from Dry Needling    []signs/symptoms which may limit the use of advanced manual therapy techniques: (Elevated CV risk profile, recent trauma, intolerance to end range positions, prior TIA, visual issues, UE neurological compromise)       Prognosis/Rehab Potential:      []Excellent   []Good    [x]Fair   []Poor    Tolerance of evaluation/treatment:    []Excellent   [x]Good    []Fair   []Poor     Physical Therapy Evaluation Complexity Justification  [x] A history of present problem with:  [] no personal factors and/or comorbidities that impact the plan of care;  []1-2 personal factors and/or comorbidities that impact the plan of care  [x]3 personal factors and/or comorbidities that impact the plan of care  [x] An examination of body systems using standardized tests and measures addressing any of the following: body structures and functions (impairments), activity limitations, and/or participation restrictions;:  [] a total of 1-2 or more elements   [] a total of 3 or more elements   [x] a total of 4 or more elements   [x] A clinical presentation with:  [] stable and/or uncomplicated characteristics   [x] evolving clinical presentation with changing characteristics  [] unstable and unpredictable characteristics;   [x] Clinical decision making of [] low, [x] moderate, [] high complexity using standardized patient assessment instrument and/or measurable assessment of functional outcome. [] EVAL (LOW) 95256 (typically 20 minutes face-to-face)  [x] EVAL (MOD) 67053 (typically 30 minutes face-to-face)  [] EVAL (HIGH) 86749 (typically 45 minutes face-to-face)  [] RE-EVAL     PLAN:   Frequency/Duration: 2 days per week for 6 Weeks:  Interventions:  [x]  Therapeutic exercise including: strength training, ROM, for cervical spine,scapula, core and Upper extremity, including postural re-education. [x]  NMR activation and proprioception for Deep cervical flexors, periscapular and RC muscles and Core, including postural re-education. [x]  Manual therapy as indicated for C/T spine, ribs, Soft tissue to include: Dry Needling/IASTM, STM, PROM, Gr I-IV mobilizations, manipulation. [x]  Therapeutic Activities for Deep cervical flexors, periscapular and RC muscles and Core, including postural re-education. [x]  Modalities as needed that may include: thermal agents, E-stim, Biofeedback, US, iontophoresis, mechanical traction as indicated  [x]  Patient education on joint protection, postural re-education, activity modification, progression of HEP. HEP: UT and levator stretches. Access Code [email or web address]    GOALS:  Patient stated goal: \"flexibility and decrease of pain\"  [] Progressing: [] Met: [] Not Met: [] Adjusted    Therapist goals for Patient:   Short Term Goals: To be achieved in: 3 weeks  - Independent in initial HEP per patient tolerance, in order to prevent re-injury. [] Progressing: [] Met: [] Not Met: [] Adjusted  - Patient will have a decrease in pain 6/10 at worst for improved tolerance to sleeping and position changes. [] Progressing: [] Met: [] Not Met: [] Adjusted    Long Term Goals:  To be achieved in: 6 weeks  - Disability index score of 52 or better on FOTO neck to assist with reaching prior level of function. [] Progressing: [] Met: [] Not Met: [] Adjusted  - Patient will demonstrate increased AROM of cervical spine 90%, B shoulder AROM WFLs to allow for driving and reaching without increased pain   [] Progressing: [] Met: [] Not Met: [] Adjusted  - Patient will demonstrate an increase in Strength B shoulders 5/5 to allow for lifting, household chores without increased pain   [] Progressing: [] Met: [] Not Met: [] Adjusted  -Patient will demonstrate an increase in postural awareness to allow for proper functional mobility as indicated by patients Functional Deficits. [] Progressing: [] Met: [] Not Met: [] Adjusted  - Patient will have a decrease in pain 3/10 at worst and decreased HA 75% for rare complaints with sleeping and position changes. [] Progressing: [] Met: [] Not Met: [] Adjusted  - Independent in HEP progression per patient tolerance, in order to prevent re-injury. [] Progressing: [] Met: [] Not Met: [] Adjusted        Electronically signed by:   Merly Lopez, [facility], DPT [id number]

## 2022-06-21 NOTE — FLOWSHEET NOTE
[facility]  [address], [address], [address]  Phone: [phone]   Fax: [phone]    Physical Therapy Treatment Note/ Progress Report:     Date:  2022    Patient Name:  Wendie Brand    :  1960  MRN: [id number]  Medical/Treatment Diagnosis Information:  Diagnosis: Neck pain (M54.2)  Treatment Diagnosis: Neck pain A84.4  Insurance/Certification information:  PT Insurance Information: [email or web address], [id number] required  Physician Information:    Konstantin Lake MD    Plan of care signed:    [] Yes  [x] No    Date of Patient follow up with Physician: Sept     Progress Report: []  Yes  [x]  No     Date Range for reporting period:  Beginnin2022  Ending:  NA    Progress report due (10 Rx/or 30 days whichever is less):      Recertification due (POC duration/ or 90 days whichever is less):      Visit # Insurance Allowable Auth Needed    1 [x]Yes   []No     RESTRICTIONS/PRECAUTIONS: asthma, chronic ischemic heart disease, CAD - stent placed, CVA, DM, diabetic polyneuropathy, HTN, Hx leukemia, OA, anxiety, depression, AV block, osteoporosis, PSVT, fibromyalgia, dizziness/vertigo - orthostatic hypotension, R radial head fx sx, balance issues, memory problems  Latex Allergy:  [x]NO      []YES  Preferred Language for Healthcare:   [x]English       []other:  Functional Scale: FOTO neck Date assessed:2022    Pain level:  4/10     SUBJECTIVE:  See eval    OBJECTIVE: See eval   Observation:    Test measurements:      Exercises/Interventions: Exercises in bold performed in department today. Items not bolded are carried forward from prior visits for continuity of the record.     Exercise/Equipment Resistance/Repetitions Other comments   UT stretch 15\" x 3 each B    Levator stretch 15\" x 3 each B    Posterior capsule stretch     Chin tuck     scap retraction                                   Assess palpation Home Exercise Program:Pt. demonstrated good understanding and knowledge of HEP. Written instructions provided. 06/21/2022: UT and levator stretches. Access Code [id number]    Therapeutic Exercise:   [x] (67072) Provided verbal/tactile cueing for activities related to strengthening, flexibility, endurance, ROM  for improvements in scapular, scapulothoracic and UE control with self care, reaching, carrying, lifting, house/yardwork, driving/computer work. NMR:   [] (85597) Provided verbal/tactile cueing for activities related to improving balance, coordination, kinesthetic sense, posture, motor skill, proprioception  to assist with  scapular, scapulothoracic and UE control with self care, reaching, carrying, lifting, house/yardwork, driving/computer work. Therapeutic Activities:    [] (54968) Provided verbal/tactile cueing for activities related to improving balance, coordination, kinesthetic sense, posture, motor skill, proprioception and motor activation to allow for proper function of scapular, scapulothoracic and UE control with self care, carrying, lifting, driving/computer work. Home Exercise Program:    [x] (57425) Reviewed/Progressed HEP activities related to strengthening, flexibility, endurance, ROM of scapular, scapulothoracic and UE control with self care, reaching, carrying, lifting, house/yardwork, driving/computer work.   [] (24821) Reviewed/Progressed HEP activities related to improving balance, coordination, kinesthetic sense, posture, motor skill, proprioception of scapular, scapulothoracic and UE control with self care, reaching, carrying, lifting, house/yardwork, driving/computer work.      Manual Treatments:  PROM / STM / Oscillations-Mobs:  G-I, II, III, IV (PA's, Inf., Post.)  [] (64891) Provided manual therapy to mobilize soft tissue/joints of cervical/CT, scapular GHJ and UE for the purpose of modulating pain, promoting relaxation,  increasing ROM, reducing/eliminating soft tissue swelling/inflammation/restriction, improving soft tissue extensibility and allowing for proper ROM for normal function with self care, reaching, carrying, lifting, house/yardwork, driving/computer work. Modalities:    [] Electric Stimulation:   [] Ultrasound:   [] Other:       Charges:  Timed Code Treatment Minutes: TE: 8   Total Treatment Minutes: 37      [] EVAL (LOW) 57231 (typically 20 minutes face-to-face)  [x] EVAL (MOD) 30486 (typically 30 minutes face-to-face)  [] EVAL (HIGH) 38059 (typically 45 minutes face-to-face)  [] RE-EVAL     [x] AZ(34807) x  1     [] NMR (03064) x       [] Manual (02649) x       [] TA (19460) x         [] ES(attended) (05354)   [] ES (un) (88646)   [] DRY NEEDLE 1 OR 2 MUSCLES  [] DRY NEEDLE 3+ MUSCLES  [] Mech Traction (73248)  [] Ultrasound (90150)  [] Other:    GOALS:    Patient stated goal: \"flexibility and decrease of pain\"  []? Progressing: []? Met: []? Not Met: []? Adjusted     Therapist goals for Patient:   Short Term Goals: To be achieved in: 3 weeks  - Independent in initial HEP per patient tolerance, in order to prevent re-injury. []? Progressing: []? Met: []? Not Met: []? Adjusted  - Patient will have a decrease in pain 6/10 at worst for improved tolerance to sleeping and position changes. []? Progressing: []? Met: []? Not Met: []? Adjusted     Long Term Goals: To be achieved in: 6 weeks  - Disability index score of 52 or better on FOTO neck to assist with reaching prior level of function. []? Progressing: []? Met: []? Not Met: []? Adjusted  - Patient will demonstrate increased AROM of cervical spine 90%, B shoulder AROM WFLs to allow for driving and reaching without increased pain   []? Progressing: []? Met: []? Not Met: []? Adjusted  - Patient will demonstrate an increase in Strength B shoulders 5/5 to allow for lifting, household chores without increased pain       []? Progressing: []? Met: []? Not Met: []?  Adjusted  -Patient will demonstrate an increase in postural awareness to allow for proper functional mobility as indicated by patients Functional Deficits. []? Progressing: []? Met: []? Not Met: []? Adjusted  - Patient will have a decrease in pain 3/10 at worst and decreased HA 75% for rare complaints with sleeping and position changes. []? Progressing: []? Met: []? Not Met: []? Adjusted  - Independent in HEP progression per patient tolerance, in order to prevent re-injury. []? Progressing: []? Met: []? Not Met: []? Adjusted         ASSESSMENT:  See eval      Treatment/Activity Tolerance:  [x] Patient tolerated treatment well [] Patient limited by fatique  [] Patient limited by pain  [] Patient limited by other medical complications  [] Other:     Overall Progression Towards Functional goals/ Treatment Progress Update:  [] Patient is progressing as expected towards functional goals listed. [] Progression is slowed due to complexities/Impairments listed. [] Progression has been slowed due to co-morbidities. [x] Plan just implemented, too soon to assess goals progression <30days   [] Goals require adjustment due to lack of progress  [] Patient is not progressing as expected and requires additional follow up with physician  [] Other    Prognosis for POC: [x] Good [] Fair  [] Poor    Patient requires continued skilled intervention: [x] Yes  [] No        PLAN: See eval  [] Continue per plan of care [] Alter current plan (see comments)  [x] Plan of care initiated [] Hold pending MD visit [] Discharge    Electronically signed by: Merly Lopez PT, DPT [id number]    Note: If patient does not return for scheduled/recommended follow up visits, this note will serve as a discharge from care along with the most recent update on progress.

## 2022-07-03 DIAGNOSIS — I25.9 CHRONIC ISCHEMIC HEART DISEASE: ICD-10-CM

## 2022-07-03 DIAGNOSIS — E11.42 DIABETIC POLYNEUROPATHY ASSOCIATED WITH TYPE 2 DIABETES MELLITUS (HCC): ICD-10-CM

## 2022-07-03 DIAGNOSIS — I10 ESSENTIAL HYPERTENSION: ICD-10-CM

## 2022-07-03 DIAGNOSIS — F41.9 ANXIETY AND DEPRESSION: ICD-10-CM

## 2022-07-03 DIAGNOSIS — F32.A ANXIETY AND DEPRESSION: ICD-10-CM

## 2022-07-03 DIAGNOSIS — E78.49 OTHER HYPERLIPIDEMIA: ICD-10-CM

## 2022-07-03 DIAGNOSIS — K21.9 GASTROESOPHAGEAL REFLUX DISEASE WITHOUT ESOPHAGITIS: ICD-10-CM

## 2022-07-03 DIAGNOSIS — E11.8 TYPE 2 DIABETES MELLITUS WITH COMPLICATIONS (HCC): ICD-10-CM

## 2022-07-05 RX ORDER — ATORVASTATIN CALCIUM 40 MG/1
40 TABLET, FILM COATED ORAL DAILY
Qty: 90 TABLET | Refills: 1 | Status: SHIPPED | OUTPATIENT
Start: 2022-07-05 | End: 2022-09-15 | Stop reason: SDUPTHER

## 2022-07-05 RX ORDER — GABAPENTIN 800 MG/1
TABLET ORAL
Qty: 180 TABLET | Refills: 0 | Status: SHIPPED | OUTPATIENT
Start: 2022-07-05 | End: 2022-08-15 | Stop reason: SDUPTHER

## 2022-07-05 RX ORDER — PANTOPRAZOLE SODIUM 40 MG/1
40 TABLET, DELAYED RELEASE ORAL DAILY
Qty: 90 TABLET | Refills: 1 | Status: SHIPPED | OUTPATIENT
Start: 2022-07-05

## 2022-07-06 RX ORDER — CLOPIDOGREL BISULFATE 75 MG/1
75 TABLET ORAL DAILY
Qty: 90 TABLET | Refills: 1 | OUTPATIENT
Start: 2022-07-06

## 2022-07-06 RX ORDER — DULOXETIN HYDROCHLORIDE 60 MG/1
60 CAPSULE, DELAYED RELEASE ORAL DAILY
Qty: 90 CAPSULE | Refills: 1 | Status: SHIPPED | OUTPATIENT
Start: 2022-07-06

## 2022-07-06 NOTE — TELEPHONE ENCOUNTER
Make sure patient and pharmacy know that the metoprolol was changed to once daily Toprol and Plavix was discontinued by cardiologist.

## 2022-07-11 ENCOUNTER — OFFICE VISIT (OUTPATIENT)
Dept: PSYCHIATRY | Age: 62
End: 2022-07-11
Payer: MEDICAID

## 2022-07-11 VITALS
WEIGHT: 174.2 LBS | SYSTOLIC BLOOD PRESSURE: 112 MMHG | HEART RATE: 78 BPM | DIASTOLIC BLOOD PRESSURE: 72 MMHG | BODY MASS INDEX: 28.12 KG/M2

## 2022-07-11 DIAGNOSIS — F33.1 MODERATE EPISODE OF RECURRENT MAJOR DEPRESSIVE DISORDER (HCC): Primary | ICD-10-CM

## 2022-07-11 DIAGNOSIS — F41.1 GAD (GENERALIZED ANXIETY DISORDER): ICD-10-CM

## 2022-07-11 PROCEDURE — 3017F COLORECTAL CA SCREEN DOC REV: CPT | Performed by: STUDENT IN AN ORGANIZED HEALTH CARE EDUCATION/TRAINING PROGRAM

## 2022-07-11 PROCEDURE — G8419 CALC BMI OUT NRM PARAM NOF/U: HCPCS | Performed by: STUDENT IN AN ORGANIZED HEALTH CARE EDUCATION/TRAINING PROGRAM

## 2022-07-11 PROCEDURE — 99214 OFFICE O/P EST MOD 30 MIN: CPT | Performed by: STUDENT IN AN ORGANIZED HEALTH CARE EDUCATION/TRAINING PROGRAM

## 2022-07-11 PROCEDURE — 1036F TOBACCO NON-USER: CPT | Performed by: STUDENT IN AN ORGANIZED HEALTH CARE EDUCATION/TRAINING PROGRAM

## 2022-07-11 PROCEDURE — G8427 DOCREV CUR MEDS BY ELIG CLIN: HCPCS | Performed by: STUDENT IN AN ORGANIZED HEALTH CARE EDUCATION/TRAINING PROGRAM

## 2022-07-11 RX ORDER — TRAZODONE HYDROCHLORIDE 50 MG/1
50 TABLET ORAL NIGHTLY
Qty: 30 TABLET | Refills: 1 | Status: SHIPPED | OUTPATIENT
Start: 2022-07-11 | End: 2022-08-15 | Stop reason: SDUPTHER

## 2022-07-11 ASSESSMENT — ENCOUNTER SYMPTOMS
GASTROINTESTINAL NEGATIVE: 1
EYES NEGATIVE: 1
ALLERGIC/IMMUNOLOGIC NEGATIVE: 1
RESPIRATORY NEGATIVE: 1

## 2022-07-11 ASSESSMENT — PATIENT HEALTH QUESTIONNAIRE - PHQ9
SUM OF ALL RESPONSES TO PHQ QUESTIONS 1-9: 21
10. IF YOU CHECKED OFF ANY PROBLEMS, HOW DIFFICULT HAVE THESE PROBLEMS MADE IT FOR YOU TO DO YOUR WORK, TAKE CARE OF THINGS AT HOME, OR GET ALONG WITH OTHER PEOPLE: 3
5. POOR APPETITE OR OVEREATING: 3
3. TROUBLE FALLING OR STAYING ASLEEP: 3
SUM OF ALL RESPONSES TO PHQ QUESTIONS 1-9: 23
SUM OF ALL RESPONSES TO PHQ QUESTIONS 1-9: 23
6. FEELING BAD ABOUT YOURSELF - OR THAT YOU ARE A FAILURE OR HAVE LET YOURSELF OR YOUR FAMILY DOWN: 3
2. FEELING DOWN, DEPRESSED OR HOPELESS: 2
SUM OF ALL RESPONSES TO PHQ QUESTIONS 1-9: 23
4. FEELING TIRED OR HAVING LITTLE ENERGY: 3
9. THOUGHTS THAT YOU WOULD BE BETTER OFF DEAD, OR OF HURTING YOURSELF: 2
7. TROUBLE CONCENTRATING ON THINGS, SUCH AS READING THE NEWSPAPER OR WATCHING TELEVISION: 3
SUM OF ALL RESPONSES TO PHQ9 QUESTIONS 1 & 2: 4
1. LITTLE INTEREST OR PLEASURE IN DOING THINGS: 2
8. MOVING OR SPEAKING SO SLOWLY THAT OTHER PEOPLE COULD HAVE NOTICED. OR THE OPPOSITE, BEING SO FIGETY OR RESTLESS THAT YOU HAVE BEEN MOVING AROUND A LOT MORE THAN USUAL: 2

## 2022-07-11 ASSESSMENT — ANXIETY QUESTIONNAIRES
5. BEING SO RESTLESS THAT IT IS HARD TO SIT STILL: 2
4. TROUBLE RELAXING: 3
6. BECOMING EASILY ANNOYED OR IRRITABLE: 2
1. FEELING NERVOUS, ANXIOUS, OR ON EDGE: 3
GAD7 TOTAL SCORE: 18
IF YOU CHECKED OFF ANY PROBLEMS ON THIS QUESTIONNAIRE, HOW DIFFICULT HAVE THESE PROBLEMS MADE IT FOR YOU TO DO YOUR WORK, TAKE CARE OF THINGS AT HOME, OR GET ALONG WITH OTHER PEOPLE: EXTREMELY DIFFICULT
3. WORRYING TOO MUCH ABOUT DIFFERENT THINGS: 3
2. NOT BEING ABLE TO STOP OR CONTROL WORRYING: 3
7. FEELING AFRAID AS IF SOMETHING AWFUL MIGHT HAPPEN: 2
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
## 2022-07-14 ENCOUNTER — HOSPITAL ENCOUNTER (OUTPATIENT)
Dept: PHYSICAL THERAPY | Age: 62
Setting detail: THERAPIES SERIES
Discharge: HOME | End: 2022-07-14
Payer: MEDICAID

## 2022-07-14 PROCEDURE — 97140 MANUAL THERAPY 1/> REGIONS: CPT

## 2022-07-14 PROCEDURE — 97110 THERAPEUTIC EXERCISES: CPT

## 2022-07-14 NOTE — FLOWSHEET NOTE
[facility] Outpatient Therapy  [address] [place] [address], BRISEYDA Cano [address], [address]  Phone: [phone]   Fax: [phone]    Physical Therapy Treatment Note/ Progress Report:     Date:  2022    Patient Name:  Delia Humphreys    :  1960  MRN: [id number]  Medical/Treatment Diagnosis Information:  Diagnosis: Neck pain (M54.2)  Treatment Diagnosis: Neck pain P18.9  Insurance/Certification information:  PT Insurance Information: [employer] [place] required  Physician Information:    Ted Foote MD    Plan of care signed:    [x] Yes  [] No    Date of Patient follow up with Physician: Sept     Progress Report: []  Yes  [x]  No     Date Range for reporting period:  Beginnin2022  Ending:  NA    Progress report due (10 Rx/or 30 days whichever is less):      Recertification due (POC duration/ or 90 days whichever is less):      Visit # Insurance Allowable Auth Needed    12 visits 2022 - 10/03/2022 ([facility] traction, US, TE, NMR, MT, TA) [x]Yes   []No     RESTRICTIONS/PRECAUTIONS: asthma, chronic ischemic heart disease, CAD - stent placed, CVA, DM, diabetic polyneuropathy, HTN, Hx leukemia, OA, anxiety, depression, AV block, osteoporosis, PSVT, fibromyalgia, dizziness/vertigo - orthostatic hypotension, R radial head fx sx, balance issues, memory problems  Latex Allergy:  [x]NO      []YES  Preferred Language for Healthcare:   [x]English       []other:  Functional Scale: FOTO neck Date assessed:2022    Pain level:  3-4/10     SUBJECTIVE:  Pt notes she could tell she was sore after IE. Just got medication yesterday to help her sleep and did help last night. OBJECTIVE:   Moderate tightness B cervical paraspinals and B UT    Exercises/Interventions: Exercises in bold performed in department today. Items not bolded are carried forward from prior visits for continuity of the record.     Exercise/Equipment Resistance/Repetitions Other comments   UT stretch 15\" x 3 each B    Levator stretch 15\" x 3 each B    Posterior capsule stretch 15\" x 3 each B    Chin tuck X 10    scap retraction                                                                        Home Exercise Program:Pt. demonstrated good understanding and knowledge of HEP. Written instructions provided. 06/21/2022: UT and levator stretches. Access Code [email or web address]  07/14/2022: chin tuck, posterior capsule stretch. Access Code [email or web address]    Therapeutic Exercise:   [x] (90587) Provided verbal/tactile cueing for activities related to strengthening, flexibility, endurance, ROM  for improvements in scapular, scapulothoracic and UE control with self care, reaching, carrying, lifting, house/yardwork, driving/computer work. NMR:   [] (38294) Provided verbal/tactile cueing for activities related to improving balance, coordination, kinesthetic sense, posture, motor skill, proprioception  to assist with  scapular, scapulothoracic and UE control with self care, reaching, carrying, lifting, house/yardwork, driving/computer work. Therapeutic Activities:    [] (14349) Provided verbal/tactile cueing for activities related to improving balance, coordination, kinesthetic sense, posture, motor skill, proprioception and motor activation to allow for proper function of scapular, scapulothoracic and UE control with self care, carrying, lifting, driving/computer work. Home Exercise Program:    [x] (94605) Reviewed/Progressed HEP activities related to strengthening, flexibility, endurance, ROM of scapular, scapulothoracic and UE control with self care, reaching, carrying, lifting, house/yardwork, driving/computer work.   [] (27884) Reviewed/Progressed HEP activities related to improving balance, coordination, kinesthetic sense, posture, motor skill, proprioception of scapular, scapulothoracic and UE control with self care, reaching, carrying, lifting, house/yardwork, driving/computer work. Manual Treatments:  PROM / STM / Oscillations-Mobs:  G-I, II, III, IV (PA's, Inf., Post.)  [x] (84326) Provided manual therapy to mobilize soft tissue/joints of cervical/CT, scapular GHJ and UE for the purpose of modulating pain, promoting relaxation,  increasing ROM, reducing/eliminating soft tissue swelling/inflammation/restriction, improving soft tissue extensibility and allowing for proper ROM for normal function with self care, reaching, carrying, lifting, house/yardwork, driving/computer work. STM to B cervical paraspinals and B UT with pt in prone and good tolerance reported. Manual B scapular depression stretch with pt in prone and good stretch noted. Modalities:    [] Electric Stimulation:   [] Ultrasound:   [] Other:       Charges:  Timed Code Treatment Minutes: TE: 15, MT: 23   Total Treatment Minutes: 38      [] EVAL (LOW) 45005 (typically 20 minutes face-to-face)  [] EVAL (MOD) 66673 (typically 30 minutes face-to-face)  [] EVAL (HIGH) 92774 (typically 45 minutes face-to-face)  [] RE-EVAL     [x] UL(87059) x  1     [] NMR (84465) x       [x] Manual (87992) x 2      [] TA (37666) x         [] ES(attended) (83903)       [] Mech Traction (16388)  [] Ultrasound (91502)  [] Other:    GOALS:    Patient stated goal: \"flexibility and decrease of pain\"  []? Progressing: []? Met: []? Not Met: []? Adjusted     Therapist goals for Patient:   Short Term Goals: To be achieved in: 3 weeks  - Independent in initial HEP per patient tolerance, in order to prevent re-injury. []? Progressing: []? Met: []? Not Met: []? Adjusted  - Patient will have a decrease in pain 6/10 at worst for improved tolerance to sleeping and position changes. []? Progressing: []? Met: []? Not Met: []? Adjusted     Long Term Goals: To be achieved in: 6 weeks  - Disability index score of 52 or better on FOTO neck to assist with reaching prior level of function. []? Progressing: []? Met: []? Not Met: []?  Adjusted  - Patient will demonstrate increased AROM of cervical spine 90%, B shoulder AROM WFLs to allow for driving and reaching without increased pain   []? Progressing: []? Met: []? Not Met: []? Adjusted  - Patient will demonstrate an increase in Strength B shoulders 5/5 to allow for lifting, household chores without increased pain       []? Progressing: []? Met: []? Not Met: []? Adjusted  -Patient will demonstrate an increase in postural awareness to allow for proper functional mobility as indicated by patients Functional Deficits. []? Progressing: []? Met: []? Not Met: []? Adjusted  - Patient will have a decrease in pain 3/10 at worst and decreased HA 75% for rare complaints with sleeping and position changes. []? Progressing: []? Met: []? Not Met: []? Adjusted  - Independent in HEP progression per patient tolerance, in order to prevent re-injury. []? Progressing: []? Met: []? Not Met: []? Adjusted         ASSESSMENT:  Pt with good tolerance to new exercises and initiating manual.  Pain decreased to 2-3/10 after session. Pt reports also feeling less tight. Pt will benefit from additional therapy to address deficits to return to PLOF. Treatment/Activity Tolerance:  [x] Patient tolerated treatment well [] Patient limited by fatique  [] Patient limited by pain  [] Patient limited by other medical complications  [] Other:     Overall Progression Towards Functional goals/ Treatment Progress Update:  [] Patient is progressing as expected towards functional goals listed. [] Progression is slowed due to complexities/Impairments listed. [] Progression has been slowed due to co-morbidities.   [x] Plan just implemented, too soon to assess goals progression <30days   [] Goals require adjustment due to lack of progress  [] Patient is not progressing as expected and requires additional follow up with physician  [] Other    Prognosis for POC: [x] Good [] Fair  [] Poor    Patient requires continued skilled intervention: [x] Yes  [] No        PLAN:   [x] Continue per plan of care [] Alter current plan (see comments)  [] Plan of care initiated [] Hold pending MD visit [] Discharge    Electronically signed by: Bozena Medrano PT, DPT [id number]    Note: If patient does not return for scheduled/recommended follow up visits, this note will serve as a discharge from care along with the most recent update on progress.

## 2022-07-18 NOTE — CARE COORDINATION
[facility] Outpatient Therapy  [address] [place] [address], [address]  Phone: [phone]   Fax: [phone]    Physical Therapy Missed Visit Note     Date:  2022    Patient Name:  Kayla León      :  1960    MRN: [id number]      Cancelled visits to date: 1  2022  No-shows to date: 0    For today's appointment patient:  [x]  Cancelled  []  Rescheduled appointment  []  No-show     Reason given by patient:  []  Patient ill  []  Conflicting appointment  []  No transportation    []  Conflict with work  []  No reason given  [x]  Other:     Comments:  patient showing cold symptoms    Electronically signed by:   Stephen Hernandez, DPT [id number]

## 2022-07-19 ENCOUNTER — HOSPITAL ENCOUNTER (OUTPATIENT)
Dept: PHYSICAL THERAPY | Age: 62
Setting detail: THERAPIES SERIES
Discharge: HOME | End: 2022-07-19
Payer: MEDICAID

## 2022-07-26 ENCOUNTER — HOSPITAL ENCOUNTER (OUTPATIENT)
Dept: PHYSICAL THERAPY | Age: 62
Setting detail: THERAPIES SERIES
Discharge: HOME | End: 2022-07-26
Payer: MEDICAID

## 2022-07-26 PROCEDURE — 97140 MANUAL THERAPY 1/> REGIONS: CPT

## 2022-07-26 PROCEDURE — 97110 THERAPEUTIC EXERCISES: CPT

## 2022-07-26 NOTE — PROGRESS NOTES
[facility] Outpatient Therapy  [address] [place] [address] [address], [address]  Phone: [phone]   Fax: [phone]    Physical Therapy Treatment Note/ Progress Report:     Date:  2022    Patient Name:  Amena Caal    :  1960  MRN: [id number]  Medical/Treatment Diagnosis Information:  Diagnosis: Neck pain (M54.2)  Treatment Diagnosis: Neck pain D76.9  Insurance/Certification information:  PT Insurance Information: [address], [address] [facility]  Physician Information:    Darnell Martinez MD    Plan of care signed:    [x] Yes  [] No    Date of Patient follow up with Physician: Sept     Progress Report: [x]  Yes  []  No     Date Range for reporting period:  Beginnin2022  Endin2022  3 visits attended, 1 cancel, 0 no shows    Progress report due (10 Rx/or 30 days whichever is less):      Recertification due (POC duration/ or 90 days whichever is less):      Visit # Insurance Allowable Auth Needed   3/12 12 visits 2022 - 10/03/2022 (mech traction, US, TE, NMR, MT, TA) [x]Yes   []No     RESTRICTIONS/PRECAUTIONS: asthma, chronic ischemic heart disease, CAD - stent placed, CVA, DM, diabetic polyneuropathy, HTN, Hx leukemia, OA, anxiety, depression, AV block, osteoporosis, PSVT, fibromyalgia, dizziness/vertigo - orthostatic hypotension, R radial head fx sx, balance issues, memory problems  Latex Allergy:  [x]NO      []YES  Preferred Language for Healthcare:   [x]English       []other:  Functional Scale: FOTO neck Date assessed:2022    Pain level:  2-3/10 primarily on L side     SUBJECTIVE:  Pt notes that after last session, she had a full day of no pain. Pt notes the weekend before last she felt the L side of neck has been worse, unsure of cause. R side is still a lot better than it was, just now tender to touch. Hasn't had the \"bad\" pain at the end of the day recently with pain at worst 8/10 recently.       OBJECTIVE: Cervical AROM flexion WFLs with cracking and sore on L side, ext 25% stiffness, L rot 75%, R rot 50% with pain, L SB 75%, R SB 50% with pain. FOTO neck = 49    Exercises/Interventions: Exercises in bold performed in department today. Items not bolded are carried forward from prior visits for continuity of the record. Exercise/Equipment Resistance/Repetitions Other comments   UT stretch 15\" x 3 each B    Levator stretch 15\" x 3 each B    Posterior capsule stretch 15\" x 3 each B    Chin tuck X 10    scap retraction X 10    Corner pec stretch 15\" x 3                                                                   Home Exercise Program:Pt. demonstrated good understanding and knowledge of HEP. Written instructions provided. 06/21/2022: UT and levator stretches. Access Code [email or web address]  07/14/2022: chin tuck, posterior capsule stretch. Access Code [facility]  07/26/2022: scap retraction, corner pec stretch. Access Code [facility]    Therapeutic Exercise:   [x] (92657) Provided verbal/tactile cueing for activities related to strengthening, flexibility, endurance, ROM  for improvements in scapular, scapulothoracic and UE control with self care, reaching, carrying, lifting, house/yardwork, driving/computer work. NMR:   [] (64259) Provided verbal/tactile cueing for activities related to improving balance, coordination, kinesthetic sense, posture, motor skill, proprioception  to assist with  scapular, scapulothoracic and UE control with self care, reaching, carrying, lifting, house/yardwork, driving/computer work. Therapeutic Activities:    [] (84328) Provided verbal/tactile cueing for activities related to improving balance, coordination, kinesthetic sense, posture, motor skill, proprioception and motor activation to allow for proper function of scapular, scapulothoracic and UE control with self care, carrying, lifting, driving/computer work.      Home Exercise Program:    [x] (43825) Reviewed/Progressed HEP activities related to strengthening, flexibility, endurance, ROM of scapular, scapulothoracic and UE control with self care, reaching, carrying, lifting, house/yardwork, driving/computer work.   [] (42243) Reviewed/Progressed HEP activities related to improving balance, coordination, kinesthetic sense, posture, motor skill, proprioception of scapular, scapulothoracic and UE control with self care, reaching, carrying, lifting, house/yardwork, driving/computer work. Manual Treatments:  PROM / STM / Oscillations-Mobs:  G-I, II, III, IV (PA's, Inf., Post.)  [x] (09440) Provided manual therapy to mobilize soft tissue/joints of cervical/CT, scapular GHJ and UE for the purpose of modulating pain, promoting relaxation,  increasing ROM, reducing/eliminating soft tissue swelling/inflammation/restriction, improving soft tissue extensibility and allowing for proper ROM for normal function with self care, reaching, carrying, lifting, house/yardwork, driving/computer work. STM to B cervical paraspinals and B UT with pt in prone and good tolerance reported. Manual B scapular depression stretch with pt in prone and good stretch noted. Modalities:    [] Electric Stimulation:   [] Ultrasound:   [] Other:       Charges:  Timed Code Treatment Minutes: TE: 25, MT: 15   Total Treatment Minutes: 40      [] EVAL (LOW) 98411 (typically 20 minutes face-to-face)  [] EVAL (MOD) 26126 (typically 30 minutes face-to-face)  [] EVAL (HIGH) 16640 (typically 45 minutes face-to-face)  [] RE-EVAL     [x] PH(77899) x  2    [] NMR (38153) x       [x] Manual (86383) x 1     [] TA (08026) x         [] ES(attended) (98400)   [] Mech Traction (79776)  [] Ultrasound (82146)  [] Other:    GOALS: assessed 07/26/2022    Patient stated goal: \"flexibility and decrease of pain\"  [x] Progressing: [] Met: [] Not Met: [] Adjusted     Therapist goals for Patient:   Short Term Goals:  To be achieved in: 3 weeks  - Independent in initial HEP per patient tolerance, in order to prevent re-injury. [] Progressing: [x] Met: [] Not Met: [] Adjusted  - Patient will have a decrease in pain 6/10 at worst for improved tolerance to sleeping and position changes. [x] Progressing: [] Met: [] Not Met: [] Adjusted     Long Term Goals: To be achieved in: 6 weeks  - Disability index score of 52 or better on FOTO neck to assist with reaching prior level of function. [x] Progressing: [] Met: [] Not Met: [] Adjusted  - Patient will demonstrate increased AROM of cervical spine 90%, B shoulder AROM WFLs to allow for driving and reaching without increased pain   [x] Progressing: [] Met: [] Not Met: [] Adjusted  - Patient will demonstrate an increase in Strength B shoulders 5/5 to allow for lifting, household chores without increased pain  not assessed     [] Progressing: [] Met: [] Not Met: [] Adjusted  -Patient will demonstrate an increase in postural awareness to allow for proper functional mobility as indicated by patients Functional Deficits. [x] Progressing: [] Met: [] Not Met: [] Adjusted  - Patient will have a decrease in pain 3/10 at worst and decreased HA 75% for rare complaints with sleeping and position changes. [x] Progressing: [] Met: [] Not Met: [] Adjusted  - Independent in HEP progression per patient tolerance, in order to prevent re-injury. [x] Progressing: [] Met: [] Not Met: [] Adjusted         ASSESSMENT:  Pt with steady progress being made towards goals in just 3 visits. Pt still with pain and limited ROM. Pt will benefit from additional therapy to address deficits to return to PLOF. Recommend pt continue 2x per week for 5 weeks.       Treatment/Activity Tolerance:  [x] Patient tolerated treatment well [] Patient limited by fatique  [] Patient limited by pain  [] Patient limited by other medical complications  [] Other:     Overall Progression Towards Functional goals/ Treatment Progress Update:  [x] Patient is progressing as expected towards functional goals listed. [] Progression is slowed due to complexities/Impairments listed. [] Progression has been slowed due to co-morbidities. [] Plan just implemented, too soon to assess goals progression <30days   [] Goals require adjustment due to lack of progress  [] Patient is not progressing as expected and requires additional follow up with physician  [] Other    Prognosis for POC: [x] Good [] Fair  [] Poor    Patient requires continued skilled intervention: [x] Yes  [] No        PLAN:   [x] Continue per plan of care [] Alter current plan (see comments)  [] Plan of care initiated [] Hold pending MD visit [] Discharge    Electronically signed by: Bobby Corona PT, DPT [id number]    Note: If patient does not return for scheduled/recommended follow up visits, this note will serve as a discharge from care along with the most recent update on progress.

## 2022-07-29 ENCOUNTER — HOSPITAL ENCOUNTER (OUTPATIENT)
Dept: PHYSICAL THERAPY | Age: 62
Setting detail: THERAPIES SERIES
Discharge: HOME | End: 2022-07-29
Payer: MEDICAID

## 2022-07-29 PROCEDURE — 97140 MANUAL THERAPY 1/> REGIONS: CPT

## 2022-07-29 PROCEDURE — 97110 THERAPEUTIC EXERCISES: CPT

## 2022-07-29 NOTE — FLOWSHEET NOTE
[facility] Outpatient Therapy  [address], [address] [facility], [address] [place] [address] Ave  Phone: [phone]   Fax: [phone]    Physical Therapy Treatment Note/ Progress Report:     Date:  2022    Patient Name:  Rocío Ortiz    :  1960  MRN: [id number]  Medical/Treatment Diagnosis Information:  Diagnosis: Neck pain (M54.2)  Treatment Diagnosis: Neck pain N72.7  Insurance/Certification information:  PT Insurance Information: [employer], [place] required  Physician Information:    Cassandra Schilling MD    Plan of care signed:    [x] Yes  [] No    Date of Patient follow up with Physician: Sept     Progress Report: []  Yes  [x]  No     Date Range for reporting period:  Beginnin2022  Ending:  NA    Progress report due (10 Rx/or 30 days whichever is less):      Recertification due (POC duration/ or 90 days whichever is less):      Visit # Insurance Allowable Auth Needed    + 1/10 12 visits 2022 - 10/03/2022 ([facility] traction, US, TE, NMR, MT, TA) [x]Yes   []No     RESTRICTIONS/PRECAUTIONS: asthma, chronic ischemic heart disease, CAD - stent placed, CVA, DM, diabetic polyneuropathy, HTN, Hx leukemia, OA, anxiety, depression, AV block, osteoporosis, PSVT, fibromyalgia, dizziness/vertigo - orthostatic hypotension, R radial head fx sx, balance issues, memory problems  Latex Allergy:  [x]NO      []YES  Preferred Language for Healthcare:   [x]English       []other:  Functional Scale: FOTO neck Date assessed:2022    Pain level:  3/10 neck pain at rest    SUBJECTIVE:  Pt notes had great relief of pain for the 1st day after last session. Then got working on cleaning and felt increased neck pain but not as bad as what it usually is. OBJECTIVE:      Exercises/Interventions: Exercises in bold performed in department today. Items not bolded are carried forward from prior visits for continuity of the record.     Exercise/Equipment Resistance/Repetitions Other comments   UT stretch 15\" x 3 each B Cues to avoid leaning body   Levator stretch 15\" x 3 each B Cues for slow transitions   Posterior capsule stretch 15\" x 3 each B    Chin tuck X 10    scap retraction X 10    Corner pec stretch 15\" x 3                                                                   Home Exercise Program:Pt. demonstrated good understanding and knowledge of HEP. Written instructions provided. 06/21/2022: UT and levator stretches. Access Code [email or web address]  07/14/2022: chin tuck, posterior capsule stretch. Access Code [facility]  07/26/2022: scap retraction, corner pec stretch. Access Code [facility]    Therapeutic Exercise:   [x] (89031) Provided verbal/tactile cueing for activities related to strengthening, flexibility, endurance, ROM  for improvements in scapular, scapulothoracic and UE control with self care, reaching, carrying, lifting, house/yardwork, driving/computer work. NMR:   [] (10626) Provided verbal/tactile cueing for activities related to improving balance, coordination, kinesthetic sense, posture, motor skill, proprioception  to assist with  scapular, scapulothoracic and UE control with self care, reaching, carrying, lifting, house/yardwork, driving/computer work. Therapeutic Activities:    [] (01146) Provided verbal/tactile cueing for activities related to improving balance, coordination, kinesthetic sense, posture, motor skill, proprioception and motor activation to allow for proper function of scapular, scapulothoracic and UE control with self care, carrying, lifting, driving/computer work.      Home Exercise Program:    [x] (64538) Reviewed/Progressed HEP activities related to strengthening, flexibility, endurance, ROM of scapular, scapulothoracic and UE control with self care, reaching, carrying, lifting, house/yardwork, driving/computer work.   [] (03233) Reviewed/Progressed HEP activities related to improving balance, coordination, kinesthetic Disability index score of 52 or better on FOTO neck to assist with reaching prior level of function. [x] Progressing: [] Met: [] Not Met: [] Adjusted  - Patient will demonstrate increased AROM of cervical spine 90%, B shoulder AROM WFLs to allow for driving and reaching without increased pain   [x] Progressing: [] Met: [] Not Met: [] Adjusted  - Patient will demonstrate an increase in Strength B shoulders 5/5 to allow for lifting, household chores without increased pain  not assessed     [] Progressing: [] Met: [] Not Met: [] Adjusted  -Patient will demonstrate an increase in postural awareness to allow for proper functional mobility as indicated by patients Functional Deficits. [x] Progressing: [] Met: [] Not Met: [] Adjusted  - Patient will have a decrease in pain 3/10 at worst and decreased HA 75% for rare complaints with sleeping and position changes. [x] Progressing: [] Met: [] Not Met: [] Adjusted  - Independent in HEP progression per patient tolerance, in order to prevent re-injury. [x] Progressing: [] Met: [] Not Met: [] Adjusted         ASSESSMENT:  Pt with good tolerance to visit today, less tension reported after manual.  Overall is feeling less pain. Pt will benefit from additional therapy to address deficits to return to PLOF. Treatment/Activity Tolerance:  [x] Patient tolerated treatment well [] Patient limited by fatique  [] Patient limited by pain  [] Patient limited by other medical complications  [] Other:     Overall Progression Towards Functional goals/ Treatment Progress Update:  [x] Patient is progressing as expected towards functional goals listed. [] Progression is slowed due to complexities/Impairments listed. [] Progression has been slowed due to co-morbidities.   [] Plan just implemented, too soon to assess goals progression <30days   [] Goals require adjustment due to lack of progress  [] Patient is not progressing as expected and requires additional follow up with physician  [] Other    Prognosis for POC: [x] Good [] Fair  [] Poor    Patient requires continued skilled intervention: [x] Yes  [] No        PLAN:   [x] Continue per plan of care [] Alter current plan (see comments)  [] Plan of care initiated [] Hold pending MD visit [] Discharge    Electronically signed by: Ruth Vaca PT, DPT [id number]    Note: If patient does not return for scheduled/recommended follow up visits, this note will serve as a discharge from care along with the most recent update on progress.

## 2022-08-01 NOTE — PROGRESS NOTES
[facility]     Outpatient Cardiology         Patient Name:  Maíra Currie  Requesting Physician: No admitting provider for patient encounter. Primary Care Physician: Chelo Baker MD    Reason for Consultation/Chief Complaint:   Chief Complaint   Patient presents with    3 Month Follow-Up     C/o sob, swelling, chest pain. Chest pain ab 1 month ago       History of Present Illness:    JIMMY Lantigua a 60 y.o. female with PMH of CAD, S/p SAMARIA x 3 to LAD on 7/3/63 complicated with RP bleed after,  HLD, sleep apnea, DM II, HTN, PSVT, SAH, CORONA. Here for follow up for CAD/HLD/HTN/PSVT. CAD, complaining of some chest/neck discomfort, it was present previously when she required PCI. HLD, on Lipitor 40, no side effects  HTN, controlled on current medications, taking only metoprolol  PSVT, symptomatic PACs      PMH  Past Medical History:   Diagnosis Date    Anxiety and depression     Asthma     AV block, 1st degree     B12 deficiency     Balance disorder     frequent falls    CAD (coronary artery disease)     Chronic ischemic heart disease     sp stents-was at [facility]-dr davalos?     CVA (cerebral vascular accident) ([address]     2003    Diabetic polyneuropathy associated with type 2 diabetes mellitus ([address]     Essential (primary) hypertension     GERD (gastroesophageal reflux disease)     H/O heart artery stent     X3    H/O total hysterectomy     Hyperparathyroidism ([address]     Hypothyroidism     Leukemia ([address]     acute lymphoblastic leukemia- [facility]    Low back pain     Mild intermittent asthma without complication     cold induced    Muscle spasms of neck     CORONA (nonalcoholic steatohepatitis)     saw dr Yovany Li    CHRIS (obstructive sleep apnea)     Osteoarthritis     hands knees, hips    Osteopenia 2021    dexa    Other hyperlipidemia     PSVT (paroxysmal supraventricular tachycardia) ([address]     Restless leg syndrome     Sleep apnea     mild    Subarachnoid hemorrhage ([address] 2003 Type 2 diabetes mellitus with complications (HCC)        PSH  Past Surgical History:   Procedure Laterality Date    ABDOMINAL EXPLORATION SURGERY      CHOLECYSTECTOMY, LAPAROSCOPIC      COLONOSCOPY N/A 05/27/2022    fu 10 yrs, COLONOSCOPY performed by Jono De Los Santos MD at [address]     radial head fx    MALLORY AND BSO (CERVIX REMOVED)      2 surgeries, 1985, 2001    UPPER GASTROINTESTINAL ENDOSCOPY N/A 05/27/2022    EGD BIOPSY performed by Jono De Los Santos MD at [facility] ENDOSCOPY        Social HIstory  Social History     Tobacco Use    Smoking status: Never    Smokeless tobacco: Never   Vaping Use    Vaping Use: Never used   Substance Use Topics    Alcohol use: Never    Drug use: Never       Family History  Family History   Problem Relation Age of Onset    Asthma Mother     Heart Failure Mother     Heart Disease Father     Lung Cancer Father     Other Brother         factor 5 leiden    Mental Illness Paternal Grandmother         hospitalized with anger issues    Suicide Nephew         hanged self in 2012    Depression Daughter     Anxiety Disorder Daughter        Allergies   Allergies   Allergen Reactions    Iodides Other (See Comments) and Shortness Of Breath     SNEEZING  SNEEZING      Nitrofurantoin Anaphylaxis, Hives, Itching, Shortness Of Breath and Swelling    Nitrofurantoin Macrocrystal Anaphylaxis    Cisapride Diarrhea    Fexofenadine-Pseudoephed Er Palpitations     HEART RACING      Metoclopramide Diarrhea and Hives    Codeine Dizziness or Vertigo and Other (See Comments)     HALLUCINATE      Oxycodone Hallucinations    Oxycodone-Acetaminophen Other (See Comments)     HALLUCINATE     Sulfamethoxazole-Trimethoprim Hives and Other (See Comments)     Surpresses bone marrow      Ciprofloxacin Hives    Clarithromycin Hives       Medications:     Home Medications:  Were reviewed and are listed in nursing record.  and/or listed below    Prior to Admission medications Medication Sig Start Date End Date Taking? Authorizing Provider   traZODone (DESYREL) 50 MG tablet Take 1 tablet by mouth nightly 7/11/22  Yes Audrey Anderson MD   DULoxetine (CYMBALTA) 60 MG extended release capsule TAKE 1 CAPSULE BY MOUTH  DAILY 7/6/22  Yes Stephany Ricks MD   atorvastatin (LIPITOR) 40 MG tablet TAKE 1 TABLET BY MOUTH  DAILY 7/5/22  Yes Stephany Ricks MD   gabapentin (NEURONTIN) 800 MG tablet TAKE 1 TABLET BY MOUTH  TWICE DAILY  Patient taking differently: Take 800 mg by mouth in the morning and 800 mg before bedtime. Takes both at night.  7/5/22 10/3/22 Yes Stephany Ricks MD   metFORMIN (GLUCOPHAGE) 500 MG tablet TAKE 2 TABLETS BY MOUTH  TWICE DAILY 7/5/22  Yes Stephany Ricks MD   pantoprazole (PROTONIX) 40 MG tablet TAKE 1 TABLET BY MOUTH  DAILY 7/5/22  Yes Stephany Ricks MD   empagliflozin (JARDIANCE) 25 MG tablet Take 1 tablet by mouth daily 7/1/22  Yes Ana Luisa Perdomo MD   levothyroxine (SYNTHROID) 50 MCG tablet TAKE ONE TABLET BY MOUTH ON MONDAY THROUGH FRIDAY AND 2 TABLETS BY MOUTH ON SATURDAY AND SUNDAY 6/17/22  Yes Stephany Ricks MD   Blood Glucose Monitoring Suppl (ONE TOUCH ULTRA 2) w/Device KIT 1 kit by Does not apply route daily 6/15/22  Yes Stephany Ricks MD   Lancets MISC 1 each by Does not apply route 2 times daily 6/15/22  Yes Stephany Ricks MD   blood glucose monitor strips Test 2 times a day & as needed for symptoms of irregular blood glucose 6/15/22  Yes Stephany Ricks MD   magnesium oxide (MAG-OX) 400 (240 Mg) MG tablet Take 1 tablet by mouth 2 times daily 6/7/22 8/8/22 Yes Stephany Ricks MD   DULoxetine (CYMBALTA) 30 MG extended release capsule Take 1 capsule by mouth daily Take with the 60mg capsule for a total of 90mg 6/6/22  Yes Audrey Anderson MD   Insulin Pen Needle 32G X 5 MM MISC 1 each by Other route 5 times daily 5/23/22  Yes Nicole Rushing Peter Ramos MD   insulin glargine (LANTUS SOLOSTAR) 100 UNIT/ML injection pen Inject 38 Units into the skin nightly 4/7/22  Yes Ana Luisa Ramos MD   Ascorbic Acid (VITAMIN C) 1000 MG tablet    Yes Historical Provider, MD   aspirin 81 MG EC tablet Take 81 mg by mouth daily   Yes Historical Provider, MD   Calcium Carbonate-Vitamin D (OYSTER SHELL CALCIUM/D) 500-200 MG-UNIT TABS Take 1 tablet by mouth 2 times daily (with meals)   Yes Historical Provider, MD   Cholecalciferol (VITAMIN D3) 125 MCG (5000 UT) CAPS    Yes Historical Provider, MD   cyanocobalamin 250 MCG tablet Take 250 mcg by mouth daily   Yes Historical Provider, MD   Melatonin 10 MG SUBL    Yes Historical Provider, MD   methocarbamol (ROBAXIN) 500 MG tablet Take 500 mg by mouth 3 times daily as needed 11/17/21  Yes Historical Provider, MD   albuterol sulfate  (90 Base) MCG/ACT inhaler Inhale 2 puffs into the lungs every 6 hours as needed for Wheezing 4/6/22  Yes Paige Mack MD   insulin lispro, 1 Unit Dial, (HUMALOG KWIKPEN) 100 UNIT/ML SOPN Use up  To 35 units daily per sliding scale 4/6/22  Yes Paige Mack MD   cetirizine (ZYRTEC ALLERGY) 10 MG tablet Take 1 tablet by mouth daily 4/6/22  Yes Paige Mack MD   metoprolol succinate (TOPROL XL) 25 MG extended release tablet Take 1 tablet by mouth daily  Patient not taking: Reported on 8/8/2022 5/5/22   Clemente Butt MD        Review of Systems   Constitutional:  Negative for activity change, appetite change, diaphoresis, fatigue, fever and unexpected weight change. HENT:  Negative for congestion, facial swelling, mouth sores and nosebleeds. Eyes:  Negative for discharge and visual disturbance. Respiratory:  Positive for chest tightness. Negative for cough, shortness of breath and wheezing. Cardiovascular:  Negative for chest pain, palpitations and leg swelling.    Gastrointestinal:  Negative for abdominal distention, abdominal pain, blood in stool and vomiting. Endocrine: Negative for cold intolerance, heat intolerance and polyuria. Genitourinary:  Negative for difficulty urinating, dysuria, frequency and hematuria. Musculoskeletal:  Negative for back pain, joint swelling, myalgias and neck pain. Skin:  Negative for color change, pallor and rash. Allergic/Immunologic: Negative for immunocompromised state. Neurological:  Negative for dizziness, syncope, weakness, light-headedness, numbness and headaches. Hematological:  Negative for adenopathy. Does not bruise/bleed easily. Psychiatric/Behavioral:  Negative for behavioral problems, confusion, decreased concentration and suicidal ideas. The patient is not nervous/anxious. Vitals:    08/08/22 1252   BP: 138/80   Pulse: 66    Weight: 174 lb 9.6 oz (79.2 kg)       Vitals:    08/08/22 1252   BP: 138/80   Pulse: 66   Weight: 174 lb 9.6 oz (79.2 kg)       BP Readings from Last 3 Encounters:   08/08/22 138/80   07/11/22 112/72   06/06/22 130/76       Wt Readings from Last 3 Encounters:   08/08/22 174 lb 9.6 oz (79.2 kg)   07/11/22 174 lb 3.2 oz (79 kg)   06/20/22 171 lb (77.6 kg)       Physical Exam  Constitutional:       General: She is not in acute distress. Appearance: She is well-developed. She is not diaphoretic. HENT:      Head: Normocephalic and atraumatic. Eyes:      Pupils: Pupils are equal, round, and reactive to light. Neck:      Thyroid: No thyromegaly. Vascular: No JVD. Cardiovascular:      Rate and Rhythm: Normal rate and regular rhythm. Chest Wall: PMI is not displaced. Heart sounds: Normal heart sounds, S1 normal and S2 normal. No murmur heard. No friction rub. No gallop. Pulmonary:      Effort: Pulmonary effort is normal. No respiratory distress. Breath sounds: Normal breath sounds. No stridor. No wheezing or rales. Chest:      Chest wall: No tenderness.    Abdominal:      General: Bowel sounds are normal. There is no distension. Palpations: Abdomen is soft. Tenderness: There is no abdominal tenderness. There is no guarding or rebound. Musculoskeletal:         General: No tenderness. Normal range of motion. Cervical back: Normal range of motion. Lymphadenopathy:      Cervical: No cervical adenopathy. Skin:     General: Skin is warm and dry. Findings: No erythema or rash. Neurological:      Mental Status: She is alert and oriented to person, place, and time. Coordination: Coordination normal.   Psychiatric:         Behavior: Behavior normal.         Thought Content:  Thought content normal.         Judgment: Judgment normal.       Labs:       Lab Results   Component Value Date    WBC 2.0 (L) 06/01/2022    HGB 13.5 06/01/2022    HCT 39.9 06/01/2022    MCV 89.6 06/01/2022     06/01/2022     Lab Results   Component Value Date     (L) 06/01/2022    K 4.4 06/01/2022    CL 96 (L) 06/01/2022    CO2 23 06/01/2022    BUN 12 06/01/2022    CREATININE 1.0 06/01/2022    GLUCOSE 178 (H) 04/06/2022    CALCIUM 9.3 06/01/2022    PROT 6.2 (L) 06/01/2022    LABALBU 4.3 06/01/2022    BILITOT 0.8 06/01/2022    ALKPHOS 96 06/01/2022    AST 62 (H) 06/01/2022    ALT 66 (H) 06/01/2022    LABGLOM 56 (A) 06/01/2022    GFRAA >60 06/01/2022    AGRATIO 2.3 (H) 06/01/2022         Lab Results   Component Value Date    CHOL 168 04/06/2022     Lab Results   Component Value Date    TRIG 158 (H) 04/06/2022     Lab Results   Component Value Date    HDL 57 04/06/2022     Lab Results   Component Value Date    LDLCALC 79 04/06/2022     Lab Results   Component Value Date    LABVLDL 32 04/06/2022     No results found for: CHOLHDLRATIO    No results found for: INR, PROTIME    The 10-year ASCVD risk score (Jah Whitman, et al., 2013) is: 8.7%    Values used to calculate the score:      Age: 60 years      Sex: Female      Is Non- : No      Diabetic: Yes      Tobacco smoker: No      Systolic Blood Pressure: 138 mmHg      Is BP treated: No      HDL Cholesterol: 57 mg/dL      Total Cholesterol: 168 mg/dL      Imaging:       Last ECG (if available, Personally interpreted):    Last Monitor/Holter (if available):    Last Stress (if available): 6/10/21  IMPRESSIONS    Normal perfusion images with no evidence of ischemia    Hyperdynamic LV function     Last Cath (if available): 3/8/19  Result Date: 3/8/2019  · 70% stenosis of the mid LAD and 60% of proximal LAD. FFR was 0.59 with significant stenosis of the proximal segment on pullback so both lesions were stented. · 1 SAMARIA placed in the proximal LAD · 2 SAMARIA placed in the mid to distal LAD with good results. Plan: 1. Ticagrelor daily for a minimum of 12 months 2. Daily aspirin indefinitely 3. Standard secondary preventative medical therapy (statin, beta blocker, ACEI if needed) 4. Start Imdur for anti-anginal management     Cardiac Procedure    Result Date: 3/8/2019  · Proximal LAD 30-40%, mid 30-40%, distal 40-50% · Cx 20-30% · Proximal RCA 20%. · The ejection fraction is greater than 55% by visual estimate. Plan FFR of LAD     Last TTE/KARLA(if available): 5/13/22   Summary   Normal left ventricle size, wall thickness, and systolic function with an   estimated ejection fraction of 60%-65%. No regional wall motion abnormalities are seen. Indeterminate diastolic function. Trivial mitral regurgitation. Mildly calcified aortic valve. The pulmonic valve is not well visualized. Last CMR  (if available):    Last Coronary Artery Calcium Score: Ankle-brachial index:    Carotid ultrasound screening:    Abdominal aortic aneurysm screening:       Assessment / Plan:     Chronic ischemic heart disease  Having some chest discomfort and neck discomfort, previously she required PCI when these were her symptoms. Plan for Paulette Avendano. Continue aspirin and Lipitor for now.     Other hyperlipidemia   Continue Lipitor    Will also start Imdur 30 mg daily  Having some lower extremity edema, will start Lasix 20 as needed for edema    Follow up in 1 months. I had the opportunity to review the clinical symptoms and presentation of Eleazar Medina. Patient's allergies and medications were reviewed and updated. Patient's past medical, surgical, social and family history were reviewed and updated. Patient's testing including laboratory, ECGs, monitor, imaging (TTE,WILLIAM,CMR,cath) were reviewed. All questions and concerns were addressed to the patient/family. Alternatives to my treatment were discussed. The note was completed using EMR. Every effort wasmade to ensure accuracy; however, inadvertent computerized transcription errors may be present. Thank you for allowing me to participate in thecare or Ramesh Bravo MD, [facility], [facility] [address]

## 2022-08-02 ENCOUNTER — HOSPITAL ENCOUNTER (OUTPATIENT)
Dept: PHYSICAL THERAPY | Age: 62
Setting detail: THERAPIES SERIES
Discharge: HOME | End: 2022-08-02
Payer: MEDICAID

## 2022-08-02 PROCEDURE — 97110 THERAPEUTIC EXERCISES: CPT

## 2022-08-02 PROCEDURE — 97140 MANUAL THERAPY 1/> REGIONS: CPT

## 2022-08-02 NOTE — FLOWSHEET NOTE
[facility] Outpatient Therapy  [address] [place] [address], [address] [facility], [address] [place] [address] Av  Phone: [phone]   Fax: [phone]    Physical Therapy Treatment Note/ Progress Report:     Date:  2022    Patient Name:  Anand Rasheed    :  1960  MRN: [id number]  Medical/Treatment Diagnosis Information:  Diagnosis: Neck pain (M54.2)  Treatment Diagnosis: Neck pain S43.4  Insurance/Certification information:  PT Insurance Information: [address] [place], [place] required  Physician Information:    Vik Vega MD    Plan of care signed:    [x] Yes  [] No    Date of Patient follow up with Physician: Sept     Progress Report: []  Yes  [x]  No     Date Range for reporting period:  Beginnin2022  Ending:  NA    Progress report due (10 Rx/or 30 days whichever is less): 43/10/6991     Recertification due (POC duration/ or 90 days whichever is less):      Visit # Insurance Allowable Auth Needed    + 2/10 12 visits 2022 - 10/03/2022 ([facility] traction, US, TE, NMR, MT, TA) [x]Yes   []No     RESTRICTIONS/PRECAUTIONS: asthma, chronic ischemic heart disease, CAD - stent placed, CVA, DM, diabetic polyneuropathy, HTN, Hx leukemia, OA, anxiety, depression, AV block, osteoporosis, PSVT, fibromyalgia, dizziness/vertigo - orthostatic hypotension, R radial head fx sx, balance issues, memory problems  Latex Allergy:  [x]NO      []YES  Preferred Language for Healthcare:   [x]English       []other:  Functional Scale: FOTO neck Date assessed:2022    Pain level:  0/10 neck pain at rest    SUBJECTIVE:  Pt notes is feeling pretty good today. Had a lot of pain in L neck yesterday likely due to busy weekend with grandkids. OBJECTIVE:      Exercises/Interventions: Exercises in bold performed in department today. Items not bolded are carried forward from prior visits for continuity of the record.     Exercise/Equipment Resistance/Repetitions Other comments   UT stretch 15\" x 3 each B Cues for slow transitions   Levator stretch 15\" x 3 each B Cues for slow transitions   Posterior capsule stretch 15\" x 3 each B    Chin tuck X 10    scap retraction X 10    Corner pec stretch 15\" x 3                                                                   Home Exercise Program:Pt. demonstrated good understanding and knowledge of HEP. Written instructions provided. 06/21/2022: UT and levator stretches. Access Code [email or web address]  07/14/2022: chin tuck, posterior capsule stretch. Access Code [facility]  07/26/2022: scap retraction, corner pec stretch. Access Code [facility]  08/02/2022: encouraged use of moist heat as helpful    Therapeutic Exercise:   [x] (02170) Provided verbal/tactile cueing for activities related to strengthening, flexibility, endurance, ROM  for improvements in scapular, scapulothoracic and UE control with self care, reaching, carrying, lifting, house/yardwork, driving/computer work. NMR:   [] (43068) Provided verbal/tactile cueing for activities related to improving balance, coordination, kinesthetic sense, posture, motor skill, proprioception  to assist with  scapular, scapulothoracic and UE control with self care, reaching, carrying, lifting, house/yardwork, driving/computer work. Therapeutic Activities:    [] (04538) Provided verbal/tactile cueing for activities related to improving balance, coordination, kinesthetic sense, posture, motor skill, proprioception and motor activation to allow for proper function of scapular, scapulothoracic and UE control with self care, carrying, lifting, driving/computer work.      Home Exercise Program:    [x] (92543) Reviewed/Progressed HEP activities related to strengthening, flexibility, endurance, ROM of scapular, scapulothoracic and UE control with self care, reaching, carrying, lifting, house/yardwork, driving/computer work.   [] (43354) Reviewed/Progressed HEP activities related to improving balance, coordination, kinesthetic sense, posture, motor skill, proprioception of scapular, scapulothoracic and UE control with self care, reaching, carrying, lifting, house/yardwork, driving/computer work. Manual Treatments:  PROM / STM / Oscillations-Mobs:  G-I, II, III, IV (PA's, Inf., Post.)  [x] (43875) Provided manual therapy to mobilize soft tissue/joints of cervical/CT, scapular GHJ and UE for the purpose of modulating pain, promoting relaxation,  increasing ROM, reducing/eliminating soft tissue swelling/inflammation/restriction, improving soft tissue extensibility and allowing for proper ROM for normal function with self care, reaching, carrying, lifting, house/yardwork, driving/computer work. STM to B cervical paraspinals and B UT with pt in prone and good tolerance reported. Manual B scapular depression stretch with pt in prone and good stretch noted. Modalities:    [] Electric Stimulation:   [] Ultrasound:   [] Other:       Charges:  Timed Code Treatment Minutes: TE: 18, MT: 12   Total Treatment Minutes: 30      [] EVAL (LOW) 19864 (typically 20 minutes face-to-face)  [] EVAL (MOD) 46545 (typically 30 minutes face-to-face)  [] EVAL (HIGH) 59480 (typically 45 minutes face-to-face)  [] RE-EVAL     [x] JI(78575) x  1    [] NMR (43698) x       [x] Manual (47065) x 1     [] TA (74908) x         [] ES(attended) (83245)   [] Mech Traction (04911)  [] Ultrasound (25092)  [] Other:    GOALS: assessed 07/26/2022    Patient stated goal: \"flexibility and decrease of pain\"  [x] Progressing: [] Met: [] Not Met: [] Adjusted     Therapist goals for Patient:   Short Term Goals: To be achieved in: 3 weeks  - Independent in initial HEP per patient tolerance, in order to prevent re-injury. [] Progressing: [x] Met: [] Not Met: [] Adjusted  - Patient will have a decrease in pain 6/10 at worst for improved tolerance to sleeping and position changes. [x] Progressing: [] Met: [] Not Met: [] Adjusted     Long Term Goals:  To be achieved in: 6 weeks  - Disability index score of 52 or better on FOTO neck to assist with reaching prior level of function. [x] Progressing: [] Met: [] Not Met: [] Adjusted  - Patient will demonstrate increased AROM of cervical spine 90%, B shoulder AROM WFLs to allow for driving and reaching without increased pain   [x] Progressing: [] Met: [] Not Met: [] Adjusted  - Patient will demonstrate an increase in Strength B shoulders 5/5 to allow for lifting, household chores without increased pain  not assessed     [] Progressing: [] Met: [] Not Met: [] Adjusted  -Patient will demonstrate an increase in postural awareness to allow for proper functional mobility as indicated by patients Functional Deficits. [x] Progressing: [] Met: [] Not Met: [] Adjusted  - Patient will have a decrease in pain 3/10 at worst and decreased HA 75% for rare complaints with sleeping and position changes. [x] Progressing: [] Met: [] Not Met: [] Adjusted  - Independent in HEP progression per patient tolerance, in order to prevent re-injury. [x] Progressing: [] Met: [] Not Met: [] Adjusted         ASSESSMENT:  Improved pain levels today, did feel tightness after exercises that improved with manual.  Pt will benefit from additional therapy to address deficits to return to PLOF. Treatment/Activity Tolerance:  [x] Patient tolerated treatment well [] Patient limited by fatique  [] Patient limited by pain  [] Patient limited by other medical complications  [] Other:     Overall Progression Towards Functional goals/ Treatment Progress Update:  [x] Patient is progressing as expected towards functional goals listed. [] Progression is slowed due to complexities/Impairments listed. [] Progression has been slowed due to co-morbidities.   [] Plan just implemented, too soon to assess goals progression <30days   [] Goals require adjustment due to lack of progress  [] Patient is not progressing as expected and requires additional follow up with physician  [] Other    Prognosis for POC: [x] Good [] Fair  [] Poor    Patient requires continued skilled intervention: [x] Yes  [] No        PLAN:   [x] Continue per plan of care [] Alter current plan (see comments)  [] Plan of care initiated [] Hold pending MD visit [] Discharge    Electronically signed by: Bambi Tomlinson PT, DPT [id number]    Note: If patient does not return for scheduled/recommended follow up visits, this note will serve as a discharge from care along with the most recent update on progress.

## 2022-08-05 ENCOUNTER — HOSPITAL ENCOUNTER (OUTPATIENT)
Dept: PHYSICAL THERAPY | Age: 62
Setting detail: THERAPIES SERIES
Discharge: HOME | End: 2022-08-05
Payer: MEDICAID

## 2022-08-05 NOTE — CARE COORDINATION
[facility]  [address] [facility] [place] [address], [address]  Phone: [phone]   Fax: [phone]    Physical Therapy Missed Visit Note     Date:  2022    Patient Name:  Lul Garcia      :  1960    MRN: [id number]      Cancelled visits to date: 2022  No-shows to date: 2022    For today's appointment patient:  []  Cancelled  []  Rescheduled appointment  [x]  No-show     Reason given by patient:  []  Patient ill  []  Conflicting appointment  []  No transportation    []  Conflict with work  []  No reason given  []  Other:     Comments:      Electronically signed by:   Scharlene Leventhal, [place], DPT [id number]

## 2022-08-08 ENCOUNTER — OFFICE VISIT (OUTPATIENT)
Dept: CARDIOLOGY CLINIC | Age: 62
End: 2022-08-08
Payer: MEDICAID

## 2022-08-08 VITALS
WEIGHT: 174.6 LBS | SYSTOLIC BLOOD PRESSURE: 138 MMHG | DIASTOLIC BLOOD PRESSURE: 80 MMHG | BODY MASS INDEX: 28.18 KG/M2 | HEART RATE: 66 BPM

## 2022-08-08 DIAGNOSIS — I25.10 CORONARY ARTERY DISEASE INVOLVING NATIVE CORONARY ARTERY OF NATIVE HEART WITHOUT ANGINA PECTORIS: ICD-10-CM

## 2022-08-08 DIAGNOSIS — R07.9 CHEST PAIN, UNSPECIFIED TYPE: Primary | ICD-10-CM

## 2022-08-08 DIAGNOSIS — I25.9 CHRONIC ISCHEMIC HEART DISEASE: ICD-10-CM

## 2022-08-08 LAB
CATARACTS: NEGATIVE
DIABETIC RETINOPATHY: NORMAL
GLAUCOMA: NEGATIVE
INTRAOCULAR PRESSURE EYE: NORMAL
VISUAL ACUITY DISTANCE LEFT EYE: NORMAL
VISUAL ACUITY DISTANCE RIGHT EYE: NORMAL

## 2022-08-08 PROCEDURE — 3017F COLORECTAL CA SCREEN DOC REV: CPT | Performed by: INTERNAL MEDICINE

## 2022-08-08 PROCEDURE — 99214 OFFICE O/P EST MOD 30 MIN: CPT | Performed by: INTERNAL MEDICINE

## 2022-08-08 PROCEDURE — G8419 CALC BMI OUT NRM PARAM NOF/U: HCPCS | Performed by: INTERNAL MEDICINE

## 2022-08-08 PROCEDURE — G8428 CUR MEDS NOT DOCUMENT: HCPCS | Performed by: INTERNAL MEDICINE

## 2022-08-08 PROCEDURE — 1036F TOBACCO NON-USER: CPT | Performed by: INTERNAL MEDICINE

## 2022-08-08 RX ORDER — FUROSEMIDE 20 MG/1
TABLET ORAL
Qty: 30 TABLET | Refills: 3 | Status: SHIPPED | OUTPATIENT
Start: 2022-08-08 | End: 2022-08-09 | Stop reason: SDUPTHER

## 2022-08-08 RX ORDER — ISOSORBIDE MONONITRATE 30 MG/1
30 TABLET, EXTENDED RELEASE ORAL DAILY
Qty: 30 TABLET | Refills: 3 | Status: SHIPPED | OUTPATIENT
Start: 2022-08-08 | End: 2022-09-07

## 2022-08-08 ASSESSMENT — ENCOUNTER SYMPTOMS
BLOOD IN STOOL: 0
CHEST TIGHTNESS: 1
BACK PAIN: 0
WHEEZING: 0
VOMITING: 0
FACIAL SWELLING: 0
ABDOMINAL DISTENTION: 0
EYE DISCHARGE: 0
ABDOMINAL PAIN: 0
COUGH: 0
SHORTNESS OF BREATH: 0
COLOR CHANGE: 0

## 2022-08-08 NOTE — ASSESSMENT & PLAN NOTE
Having some chest discomfort and neck discomfort, previously she required PCI when these were her symptoms. Plan for [facility]. Continue aspirin and Lipitor for now.

## 2022-08-09 ENCOUNTER — TELEPHONE (OUTPATIENT)
Dept: CARDIOLOGY CLINIC | Age: 62
End: 2022-08-09

## 2022-08-09 DIAGNOSIS — I25.9 CHRONIC ISCHEMIC HEART DISEASE: ICD-10-CM

## 2022-08-09 RX ORDER — FUROSEMIDE 20 MG/1
TABLET ORAL
Qty: 30 TABLET | Refills: 3 | Status: SHIPPED | OUTPATIENT
Start: 2022-08-09

## 2022-08-13 NOTE — PROGRESS NOTES
PSYCHIATRY PROGRESS NOTE    Delia Humphreys  1960  08/15/22  Face to Face time: 30 minutes  PCP: Олег Anne MD    CC:   Chief Complaint   Patient presents with    Follow-up     Medication check     Patient is a 79-year-old female with a past medical history significant for leukemia at age 11, subarachnoid hemorrhage, CHRIS, hypertension, RLS, diabetes with polyneuropathy, coronary artery disease status post stenting, GERD, hypothyroidism, and arthritis who presents to the outpatient psychiatric clinic today for evaluation management of depression and anxiety. A:  Patient's presentation today is indicative of marked improvement in her but depression and anxiety compared to previous. It does appear that these 2 scores were heavily tied to the patient's experience with insomnia and the subsequent fatigue from poor quality sleep that followed. Patient does still have some medical issues that are being worked on at present and may even continue to improve her overall mental health picture over the course of the next couple of months. We will continue to monitor and provide medication assistance as needed. Diagnosis:  Major depressive disorder, recurrent moderate  Generalized anxiety disorder  Adjustment disorder with mixed depressed and anxious features       P:   1. We will plan to maintain the patient on her current medication regimen of trazodone 50 mg nightly and duloxetine 90 mg daily. Medication Monitoring:    - PDMP reviewed: Current Gabapentin prescription    Follow-up: 8 weeks    Safety: Pt was counseled on the potential for increased suicidal ideations and advised on potential options for dealing with these including hotlines, calling the office, or going to the nearest emergency room.       __________________________________________________________________________    S:   Patient identified that she is doing markedly better with the addition of the trazodone to her medication regimen. Patient noted that she is getting approximately 7 hours of sleep nightly now, notably increased from where she was previous. The patient noted that when she started the medication she was getting about 9 hours, now feels that things have tailed off just a little bit. She notes she is down to about 45 minutes of insomnia now (previously upwards of 2 hours). Her pain during the day is also somewhat down. Patient does not feel as anxious, notes that her depressive symptoms have also decreased significantly. She is having a higher amount of dreams, with some of them being bad dreams, though these are not notable for being nightmares or being overly persistent. Overall, the patient feels that she is getting back to being more of her old self, feeling that her full personality is better able to come through despite the fact that she is facing current medical challenges such as diabetic retinopathy (possible blindness in her future) as well as hypoglycemic episodes and sleep apnea issues. Patient denied any SI/HI/AVH on evaluation today. ROS:  Review of Systems   Constitutional:  Positive for fatigue. HENT: Negative. Eyes:  Positive for visual disturbance. Respiratory: Negative. Cardiovascular: Negative. Gastrointestinal: Negative. Endocrine: Negative. Genitourinary: Negative. Musculoskeletal:  Positive for arthralgias. Skin: Negative. Allergic/Immunologic: Negative. Neurological: Negative. Hematological: Negative. Psychiatric/Behavioral:  Positive for dysphoric mood and sleep disturbance.        Brief Medical Hx:   Patient Active Problem List   Diagnosis    Chronic ischemic heart disease    Diabetic polyneuropathy associated with type 2 diabetes mellitus (HCC)    GERD (gastroesophageal reflux disease)    Leukemia (HCC)    Hypothyroidism    CORONA (nonalcoholic steatohepatitis)    Other hyperlipidemia    Type 2 diabetes mellitus with complications ([facility] [address]    Restless leg syndrome    Mild intermittent asthma without complication    Balance disorder    CHRIS (obstructive sleep apnea)    Coronary artery disease involving native coronary artery of native heart without angina pectoris    Primary hypertension    Palpitations    Moderate episode of recurrent major depressive disorder (HCC)    KARIE (generalized anxiety disorder)        Brief Psych Hx:  Hosp: Denied  Diagnoses: Depression and anxiety  Med trials: Amitriptyline for years, nortriptyline  Outpt: Previously seen multiple therapists but no psychiatrist  NSSI: Denied  Suicide Attempts: Denied    O:  Wt Readings from Last 3 Encounters:   08/15/22 169 lb 12.8 oz (77 kg)   08/08/22 174 lb 9.6 oz (79.2 kg)   07/11/22 174 lb 3.2 oz (79 kg)       Vitals:    08/15/22 1250   BP: 128/70   Pulse: 71   Weight: 169 lb 12.8 oz (77 kg)       Mental Status Exam:   Appearance:    Appropriately dressed  Motor: No abnormal movements, tics or mannerisms.   Eye Contact: Good  Speech:    Appropriate rate and rhythm  Language:   Appropriate diction  Mood/Affect:  \" I am a lot better\"/full range of affect including significant increase to laughter  Thought Process:   Linear, logical  Thought Content:    Topic-appropriate, no SI/HI  Hallucinations:   Denied, not seem to be responding to internal stimuli  Associations:   Intact  Attention/Concentration:   Intact  Orientation:    Alert and oriented x4  Memory:   Intact  Fund of Knowledge:    Appropriate for age and education  Insight/Judgement:   Intact/intact      KARIE-7 SCREENING 8/15/2022 7/11/2022   Feeling nervous, anxious, or on edge Several days Nearly every day   Not being able to stop or control worrying Several days Nearly every day   Worrying too much about different things Several days Nearly every day   Trouble relaxing Several days Nearly every day   Being so restless that it is hard to sit still Several days More than half the days   Becoming easily annoyed or irritable Not at all More than half the days   Feeling afraid as if something awful might happen Not at all More than half the days   KARIE-7 Total Score 5 18   How difficult have these problems made it for you to do your work, take care of things at home, or get along with other people? Somewhat difficult Extremely difficult     PHQ-9 Questionaire 8/15/2022 2022   Little interest or pleasure in doing things 1 2   Feeling down, depressed, or hopeless 1 2   Trouble falling or staying asleep, or sleeping too much 2 3   Feeling tired or having little energy 2 3   Poor appetite or overeating 1 3   Feeling bad about yourself - or that you are a failure or have let yourself or your family down 1 3   Trouble concentrating on things, such as reading the newspaper or watching television 1 3   Moving or speaking so slowly that other people could have noticed. Or the opposite - being so fidgety or restless that you have been moving around a lot more than usual 2 2   Thoughts that you would be better off dead, or of hurting yourself in some way 1 2   PHQ-9 Total Score 12 23   If you checked off any problems, how difficult have these problems made it for you to do your work, take care of things at home, or get along with other people?  1 3      Labs:     Hospital Outpatient Visit on 2022   Component Date Value Ref Range Status    Visual Acuity Distance Right Eye 2022 20/25   Final    Visual Acuity Distance Left Eye 2022 20/30   Final    Intraocular Pressure Eye 2022    Final                    Value:17  19      Diabetic Retinopathy 2022 POSITIVE - MONITOR   Final    Cataracts 2022 NEGATIVE   Final    Glaucoma 2022 NEGATIVE   Final       EK2022 QTc 407        Lyle Ramirez MD  Psychiatrist

## 2022-08-15 ENCOUNTER — TELEPHONE (OUTPATIENT)
Dept: INTERNAL MEDICINE CLINIC | Age: 62
End: 2022-08-15

## 2022-08-15 ENCOUNTER — OFFICE VISIT (OUTPATIENT)
Dept: PSYCHIATRY | Age: 62
End: 2022-08-15
Payer: MEDICAID

## 2022-08-15 VITALS
SYSTOLIC BLOOD PRESSURE: 128 MMHG | BODY MASS INDEX: 27.41 KG/M2 | HEART RATE: 71 BPM | WEIGHT: 169.8 LBS | DIASTOLIC BLOOD PRESSURE: 70 MMHG

## 2022-08-15 DIAGNOSIS — R42 DIZZINESS: Primary | ICD-10-CM

## 2022-08-15 DIAGNOSIS — E11.42 DIABETIC POLYNEUROPATHY ASSOCIATED WITH TYPE 2 DIABETES MELLITUS (HCC): ICD-10-CM

## 2022-08-15 DIAGNOSIS — F33.1 MODERATE EPISODE OF RECURRENT MAJOR DEPRESSIVE DISORDER (HCC): Primary | ICD-10-CM

## 2022-08-15 PROCEDURE — G8419 CALC BMI OUT NRM PARAM NOF/U: HCPCS | Performed by: STUDENT IN AN ORGANIZED HEALTH CARE EDUCATION/TRAINING PROGRAM

## 2022-08-15 PROCEDURE — G8427 DOCREV CUR MEDS BY ELIG CLIN: HCPCS | Performed by: STUDENT IN AN ORGANIZED HEALTH CARE EDUCATION/TRAINING PROGRAM

## 2022-08-15 PROCEDURE — 3017F COLORECTAL CA SCREEN DOC REV: CPT | Performed by: STUDENT IN AN ORGANIZED HEALTH CARE EDUCATION/TRAINING PROGRAM

## 2022-08-15 PROCEDURE — 99214 OFFICE O/P EST MOD 30 MIN: CPT | Performed by: STUDENT IN AN ORGANIZED HEALTH CARE EDUCATION/TRAINING PROGRAM

## 2022-08-15 PROCEDURE — 1036F TOBACCO NON-USER: CPT | Performed by: STUDENT IN AN ORGANIZED HEALTH CARE EDUCATION/TRAINING PROGRAM

## 2022-08-15 RX ORDER — TRAZODONE HYDROCHLORIDE 50 MG/1
50 TABLET ORAL NIGHTLY
Qty: 30 TABLET | Refills: 2 | Status: SHIPPED | OUTPATIENT
Start: 2022-08-15 | End: 2022-09-13 | Stop reason: SDUPTHER

## 2022-08-15 RX ORDER — DULOXETIN HYDROCHLORIDE 30 MG/1
30 CAPSULE, DELAYED RELEASE ORAL DAILY
Qty: 90 CAPSULE | Refills: 1 | Status: SHIPPED | OUTPATIENT
Start: 2022-08-15 | End: 2022-10-10 | Stop reason: SDUPTHER

## 2022-08-15 RX ORDER — GABAPENTIN 800 MG/1
800 TABLET ORAL 2 TIMES DAILY
Qty: 180 TABLET | Refills: 0 | Status: SHIPPED | OUTPATIENT
Start: 2022-08-15 | End: 2022-08-29

## 2022-08-15 ASSESSMENT — PATIENT HEALTH QUESTIONNAIRE - PHQ9
1. LITTLE INTEREST OR PLEASURE IN DOING THINGS: 1
SUM OF ALL RESPONSES TO PHQ QUESTIONS 1-9: 12
9. THOUGHTS THAT YOU WOULD BE BETTER OFF DEAD, OR OF HURTING YOURSELF: 1
4. FEELING TIRED OR HAVING LITTLE ENERGY: 2
SUM OF ALL RESPONSES TO PHQ QUESTIONS 1-9: 12
SUM OF ALL RESPONSES TO PHQ QUESTIONS 1-9: 12
3. TROUBLE FALLING OR STAYING ASLEEP: 2
2. FEELING DOWN, DEPRESSED OR HOPELESS: 1
SUM OF ALL RESPONSES TO PHQ QUESTIONS 1-9: 11
6. FEELING BAD ABOUT YOURSELF - OR THAT YOU ARE A FAILURE OR HAVE LET YOURSELF OR YOUR FAMILY DOWN: 1
SUM OF ALL RESPONSES TO PHQ9 QUESTIONS 1 & 2: 2
10. IF YOU CHECKED OFF ANY PROBLEMS, HOW DIFFICULT HAVE THESE PROBLEMS MADE IT FOR YOU TO DO YOUR WORK, TAKE CARE OF THINGS AT HOME, OR GET ALONG WITH OTHER PEOPLE: 1
5. POOR APPETITE OR OVEREATING: 1
7. TROUBLE CONCENTRATING ON THINGS, SUCH AS READING THE NEWSPAPER OR WATCHING TELEVISION: 1
8. MOVING OR SPEAKING SO SLOWLY THAT OTHER PEOPLE COULD HAVE NOTICED. OR THE OPPOSITE, BEING SO FIGETY OR RESTLESS THAT YOU HAVE BEEN MOVING AROUND A LOT MORE THAN USUAL: 2

## 2022-08-15 ASSESSMENT — ANXIETY QUESTIONNAIRES
GAD7 TOTAL SCORE: 5
1. FEELING NERVOUS, ANXIOUS, OR ON EDGE: 1
2. NOT BEING ABLE TO STOP OR CONTROL WORRYING: 1
5. BEING SO RESTLESS THAT IT IS HARD TO SIT STILL: 1
6. BECOMING EASILY ANNOYED OR IRRITABLE: 0
4. TROUBLE RELAXING: 1
7. FEELING AFRAID AS IF SOMETHING AWFUL MIGHT HAPPEN: 0
3. WORRYING TOO MUCH ABOUT DIFFERENT THINGS: 1
IF YOU CHECKED OFF ANY PROBLEMS ON THIS QUESTIONNAIRE, HOW DIFFICULT HAVE THESE PROBLEMS MADE IT FOR YOU TO DO YOUR WORK, TAKE CARE OF THINGS AT HOME, OR GET ALONG WITH OTHER PEOPLE: SOMEWHAT DIFFICULT

## 2022-08-15 ASSESSMENT — ENCOUNTER SYMPTOMS
RESPIRATORY NEGATIVE: 1
GASTROINTESTINAL NEGATIVE: 1
ALLERGIC/IMMUNOLOGIC NEGATIVE: 1

## 2022-08-15 NOTE — TELEPHONE ENCOUNTER
Patient is seeing  on 9/7 and would like to see if Dr. Brandon Back can enter labs for CBC? Please call her at number provided to let her know.

## 2022-08-16 ENCOUNTER — HOSPITAL ENCOUNTER (OUTPATIENT)
Dept: PHYSICAL THERAPY | Age: 62
Setting detail: THERAPIES SERIES
Discharge: HOME | End: 2022-08-16
Payer: MEDICAID

## 2022-08-16 PROCEDURE — 97140 MANUAL THERAPY 1/> REGIONS: CPT

## 2022-08-16 PROCEDURE — 97110 THERAPEUTIC EXERCISES: CPT

## 2022-08-16 NOTE — FLOWSHEET NOTE
related to strengthening, flexibility, endurance, ROM of scapular, scapulothoracic and UE control with self care, reaching, carrying, lifting, house/yardwork, driving/computer work.   [] (21747) Reviewed/Progressed HEP activities related to improving balance, coordination, kinesthetic sense, posture, motor skill, proprioception of scapular, scapulothoracic and UE control with self care, reaching, carrying, lifting, house/yardwork, driving/computer work. Manual Treatments:  PROM / STM / Oscillations-Mobs:  G-I, II, III, IV (PA's, Inf., Post.)  [x] (32412) Provided manual therapy to mobilize soft tissue/joints of cervical/CT, scapular GHJ and UE for the purpose of modulating pain, promoting relaxation,  increasing ROM, reducing/eliminating soft tissue swelling/inflammation/restriction, improving soft tissue extensibility and allowing for proper ROM for normal function with self care, reaching, carrying, lifting, house/yardwork, driving/computer work. STM to B cervical paraspinals and B UT with pt in prone and good tolerance reported. Manual B scapular depression stretch with pt in prone and good stretch noted. Modalities:    [] Electric Stimulation:   [] Ultrasound:   [] Other:       Charges:  Timed Code Treatment Minutes: TE: 25, MT: 15   Total Treatment Minutes: 40      [] EVAL (LOW) 32441 (typically 20 minutes face-to-face)  [] EVAL (MOD) 18157 (typically 30 minutes face-to-face)  [] EVAL (HIGH) 44481 (typically 45 minutes face-to-face)  [] RE-EVAL     [x] VX(14809) x  2    [] NMR (13715) x       [x] Manual (19599) x 1     [] TA (88817) x         [] ES(attended) (60786)   [] Mech Traction (12119)  [] Ultrasound (32849)  [] Other:    GOALS: assessed 07/26/2022    Patient stated goal: \"flexibility and decrease of pain\"  [x] Progressing: [] Met: [] Not Met: [] Adjusted     Therapist goals for Patient:   Short Term Goals:  To be achieved in: 3 weeks  - Independent in initial HEP per patient tolerance, in order to prevent re-injury. [] Progressing: [x] Met: [] Not Met: [] Adjusted  - Patient will have a decrease in pain 6/10 at worst for improved tolerance to sleeping and position changes. [x] Progressing: [] Met: [] Not Met: [] Adjusted     Long Term Goals: To be achieved in: 6 weeks  - Disability index score of 52 or better on FOTO neck to assist with reaching prior level of function. [x] Progressing: [] Met: [] Not Met: [] Adjusted  - Patient will demonstrate increased AROM of cervical spine 90%, B shoulder AROM WFLs to allow for driving and reaching without increased pain   [x] Progressing: [] Met: [] Not Met: [] Adjusted  - Patient will demonstrate an increase in Strength B shoulders 5/5 to allow for lifting, household chores without increased pain  not assessed     [] Progressing: [] Met: [] Not Met: [] Adjusted  -Patient will demonstrate an increase in postural awareness to allow for proper functional mobility as indicated by patients Functional Deficits. [x] Progressing: [] Met: [] Not Met: [] Adjusted  - Patient will have a decrease in pain 3/10 at worst and decreased HA 75% for rare complaints with sleeping and position changes. [x] Progressing: [] Met: [] Not Met: [] Adjusted  - Independent in HEP progression per patient tolerance, in order to prevent re-injury. [x] Progressing: [] Met: [] Not Met: [] Adjusted         ASSESSMENT:  Good tolerance to new exercises added today for postural strengthening. Pt noted felt looser after manual.  Pt will benefit from additional therapy to address deficits to return to PLOF. Treatment/Activity Tolerance:  [x] Patient tolerated treatment well [] Patient limited by fatique  [] Patient limited by pain  [] Patient limited by other medical complications  [] Other:     Overall Progression Towards Functional goals/ Treatment Progress Update:  [x] Patient is progressing as expected towards functional goals listed.     [] Progression is slowed due to complexities/Impairments listed. [] Progression has been slowed due to co-morbidities. [] Plan just implemented, too soon to assess goals progression <30days   [] Goals require adjustment due to lack of progress  [] Patient is not progressing as expected and requires additional follow up with physician  [] Other    Prognosis for POC: [x] Good [] Fair  [] Poor    Patient requires continued skilled intervention: [x] Yes  [] No        PLAN:   [x] Continue per plan of care [] Alter current plan (see comments)  [] Plan of care initiated [] Hold pending MD visit [] Discharge    Electronically signed by: Sunil Godoy PT, DPT [id number]    Note: If patient does not return for scheduled/recommended follow up visits, this note will serve as a discharge from care along with the most recent update on progress.

## 2022-08-17 ENCOUNTER — HOSPITAL ENCOUNTER (OUTPATIENT)
Dept: NON INVASIVE DIAGNOSTICS | Age: 62
Discharge: HOME | End: 2022-08-17
Payer: MEDICAID

## 2022-08-17 DIAGNOSIS — I25.10 CORONARY ARTERY DISEASE INVOLVING NATIVE CORONARY ARTERY OF NATIVE HEART WITHOUT ANGINA PECTORIS: ICD-10-CM

## 2022-08-17 DIAGNOSIS — R07.9 CHEST PAIN, UNSPECIFIED TYPE: ICD-10-CM

## 2022-08-17 DIAGNOSIS — I25.9 CHRONIC ISCHEMIC HEART DISEASE: ICD-10-CM

## 2022-08-17 LAB
LV EF: 85 %
LVEF MODALITY: NORMAL

## 2022-08-17 PROCEDURE — 3430000000 HC RX DIAGNOSTIC RADIOPHARMACEUTICAL: Performed by: INTERNAL MEDICINE

## 2022-08-17 PROCEDURE — A9502 TC99M TETROFOSMIN: HCPCS | Performed by: INTERNAL MEDICINE

## 2022-08-17 PROCEDURE — 78452 HT MUSCLE IMAGE SPECT MULT: CPT

## 2022-08-17 PROCEDURE — 93017 CV STRESS TEST TRACING ONLY: CPT

## 2022-08-17 PROCEDURE — 6360000002 HC RX W HCPCS: Performed by: INTERNAL MEDICINE

## 2022-08-17 RX ADMIN — TETROFOSMIN 30 MILLICURIE: 1.38 INJECTION, POWDER, LYOPHILIZED, FOR SOLUTION INTRAVENOUS at 10:43

## 2022-08-17 RX ADMIN — REGADENOSON 0.4 MG: 0.08 INJECTION, SOLUTION INTRAVENOUS at 10:43

## 2022-08-17 RX ADMIN — TETROFOSMIN 10 MILLICURIE: 1.38 INJECTION, POWDER, LYOPHILIZED, FOR SOLUTION INTRAVENOUS at 09:36

## 2022-08-19 ENCOUNTER — TELEMEDICINE (OUTPATIENT)
Dept: INTERNAL MEDICINE CLINIC | Age: 62
End: 2022-08-19
Payer: MEDICAID

## 2022-08-19 ENCOUNTER — HOSPITAL ENCOUNTER (OUTPATIENT)
Dept: PHYSICAL THERAPY | Age: 62
Setting detail: THERAPIES SERIES
Discharge: HOME | End: 2022-08-19
Payer: MEDICAID

## 2022-08-19 ENCOUNTER — TELEPHONE (OUTPATIENT)
Dept: INTERNAL MEDICINE CLINIC | Age: 62
End: 2022-08-19

## 2022-08-19 DIAGNOSIS — U07.1 COVID-19: Primary | ICD-10-CM

## 2022-08-19 DIAGNOSIS — E11.8 TYPE 2 DIABETES MELLITUS WITH COMPLICATIONS (HCC): ICD-10-CM

## 2022-08-19 PROCEDURE — 99448 NTRPROF PH1/NTRNET/EHR 21-30: CPT | Performed by: INTERNAL MEDICINE

## 2022-08-19 RX ORDER — INSULIN GLARGINE 100 [IU]/ML
25 INJECTION, SOLUTION SUBCUTANEOUS NIGHTLY
Qty: 5 PEN | Refills: 0
Start: 2022-08-19 | End: 2022-08-29

## 2022-08-19 NOTE — TELEPHONE ENCOUNTER
----- Message from Dayana Garner sent at 8/19/2022  2:59 PM EDT -----  Subject: Message to Provider    QUESTIONS  Information for Provider? pt is covid positive on 8/19 and she would like   some advise on what to do  She is going to upload her results into   [employer]  She would also like to make Andres Phipps aware because she   seen him on 8/15/2022  ---------------------------------------------------------------------------  --------------  [phone]  [phone]; OK to leave message on voicemail  ---------------------------------------------------------------------------  --------------  SCRIPT ANSWERS  Relationship to Patient?  Self

## 2022-08-19 NOTE — CARE COORDINATION
[facility] Outpatient Therapy  [address], [address], [address]  Phone: [phone]   Fax: [phone]    Physical Therapy Missed Visit Note     Date:  2022    Patient Name:  Robert Sánchez      :  1960    MRN: [id number]      Cancelled visits to date: 2  2022  No-shows to date: 2022    For today's appointment patient:  [x]  Cancelled  []  Rescheduled appointment  []  No-show     Reason given by patient:  []  Patient ill  []  Conflicting appointment  []  No transportation    []  Conflict with work  []  No reason given  [x]  Other:     Comments:  problems with her asthma    Electronically signed by:   Stephen Rivero, DPT [id number]

## 2022-08-19 NOTE — TELEPHONE ENCOUNTER
Patient states some of her symptoms started Tuesday evening and it has since progressed. Took home covid test and there is a faint line. Currently has nausea, diarrhea, cough, sore throat low grade fever, some wheezing & chest pressure. Had Stress test on Wed & everything looked fine. Using inhaler has helped. Advised patient that Dr. Lynnette Mcdonough is gone for the day. Patient states she is doing ok right now, doesn't think she needs me to get in touch with  for a virtual for plaxovid. She will call jenny lind if symptoms get any worse.

## 2022-08-20 NOTE — PROGRESS NOTES
Rocío Ortiz is a 60 y.o. female evaluated via telephone on 8/19/2022 for Positive For Covid-19  . Documentation:  I communicated with the patient and/or health care decision maker about positive Covid test today. Details of this discussion including any medical advice provided:   Had headache 3 days ago (so stopped isorsorbide which she had just started). Has had diarrhea and nausea today, coughing, sore throat, pain in chest, temp 99.4. Has had 3 Covid shots. Mild elevated liver enzymes, and GFR 56. Advised Paxlovid due to age and past medical history. Told her drugs to avoid due to drug interactions. See [employer] message with instructions. Avoid atorvastatin and trazodone for next 10 days and avoid Metformin and Jardiance for 5 days while on Paxlovid. Total Time: minutes: 21 minutes    Rocío Ortiz was evaluated through a synchronous (real-time) audio encounter. Patient identification was verified at the start of the visit. She (or guardian if applicable) is aware that this is a billable service, which includes applicable co-pays. This visit was conducted with the patient's (and/or legal guardian's) verbal consent. She has not had a related appointment within my department in the past 7 days or scheduled within the next 24 hours. The patient was located at Home: [address] [place] [address]. The provider was located at Home ([facility]): [facility].     Note: not billable if this call serves to triage the patient into an appointment for the relevant concern    Mike Garcia MD

## 2022-08-21 ENCOUNTER — TELEPHONE (OUTPATIENT)
Dept: INTERNAL MEDICINE CLINIC | Age: 62
End: 2022-08-21

## 2022-08-21 ENCOUNTER — APPOINTMENT (OUTPATIENT)
Dept: CT IMAGING | Age: 62
End: 2022-08-21
Payer: MEDICAID

## 2022-08-21 ENCOUNTER — HOSPITAL ENCOUNTER (EMERGENCY)
Age: 62
Discharge: HOME | End: 2022-08-22
Attending: STUDENT IN AN ORGANIZED HEALTH CARE EDUCATION/TRAINING PROGRAM
Payer: MEDICAID

## 2022-08-21 DIAGNOSIS — R51.9 INTRACTABLE HEADACHE, UNSPECIFIED CHRONICITY PATTERN, UNSPECIFIED HEADACHE TYPE: Primary | ICD-10-CM

## 2022-08-21 LAB
A/G RATIO: 2.1 (ref 1.1–2.2)
ALBUMIN SERPL-MCNC: 4.2 G/DL (ref 3.4–5)
ALP BLD-CCNC: 70 U/L (ref 40–129)
ALT SERPL-CCNC: 23 U/L (ref 10–40)
ANION GAP SERPL CALCULATED.3IONS-SCNC: 12 MMOL/L (ref 3–16)
AST SERPL-CCNC: 21 U/L (ref 15–37)
BASOPHILS ABSOLUTE: 0 K/UL (ref 0–0.2)
BASOPHILS RELATIVE PERCENT: 0.5 %
BILIRUB SERPL-MCNC: 0.5 MG/DL (ref 0–1)
BUN BLDV-MCNC: 14 MG/DL (ref 7–20)
CALCIUM SERPL-MCNC: 9.3 MG/DL (ref 8.3–10.6)
CHLORIDE BLD-SCNC: 101 MMOL/L (ref 99–110)
CO2: 26 MMOL/L (ref 21–32)
CREAT SERPL-MCNC: 1 MG/DL (ref 0.6–1.2)
EOSINOPHILS ABSOLUTE: 0.1 K/UL (ref 0–0.6)
EOSINOPHILS RELATIVE PERCENT: 2.4 %
GFR AFRICAN AMERICAN: >60
GFR NON-AFRICAN AMERICAN: 56
GLUCOSE BLD-MCNC: 149 MG/DL (ref 70–99)
HCT VFR BLD CALC: 36.5 % (ref 36–48)
HEMOGLOBIN: 12.1 G/DL (ref 12–16)
LYMPHOCYTES ABSOLUTE: 1.3 K/UL (ref 1–5.1)
LYMPHOCYTES RELATIVE PERCENT: 40.7 %
MCH RBC QN AUTO: 29.7 PG (ref 26–34)
MCHC RBC AUTO-ENTMCNC: 33.2 G/DL (ref 31–36)
MCV RBC AUTO: 89.5 FL (ref 80–100)
MONOCYTES ABSOLUTE: 0.3 K/UL (ref 0–1.3)
MONOCYTES RELATIVE PERCENT: 8.7 %
NEUTROPHILS ABSOLUTE: 1.5 K/UL (ref 1.7–7.7)
NEUTROPHILS RELATIVE PERCENT: 47.7 %
PDW BLD-RTO: 12.9 % (ref 12.4–15.4)
PLATELET # BLD: 176 K/UL (ref 135–450)
PMV BLD AUTO: 9.2 FL (ref 5–10.5)
POTASSIUM SERPL-SCNC: 4.1 MMOL/L (ref 3.5–5.1)
RBC # BLD: 4.08 M/UL (ref 4–5.2)
SODIUM BLD-SCNC: 139 MMOL/L (ref 136–145)
TOTAL PROTEIN: 6.2 G/DL (ref 6.4–8.2)
TROPONIN: <0.01 NG/ML
WBC # BLD: 3.2 K/UL (ref 4–11)

## 2022-08-21 PROCEDURE — 36415 COLL VENOUS BLD VENIPUNCTURE: CPT

## 2022-08-21 PROCEDURE — 83735 ASSAY OF MAGNESIUM: CPT

## 2022-08-21 PROCEDURE — 6360000004 HC RX CONTRAST MEDICATION: Performed by: STUDENT IN AN ORGANIZED HEALTH CARE EDUCATION/TRAINING PROGRAM

## 2022-08-21 PROCEDURE — 93005 ELECTROCARDIOGRAM TRACING: CPT | Performed by: STUDENT IN AN ORGANIZED HEALTH CARE EDUCATION/TRAINING PROGRAM

## 2022-08-21 PROCEDURE — 6360000002 HC RX W HCPCS: Performed by: STUDENT IN AN ORGANIZED HEALTH CARE EDUCATION/TRAINING PROGRAM

## 2022-08-21 PROCEDURE — 6370000000 HC RX 637 (ALT 250 FOR IP): Performed by: STUDENT IN AN ORGANIZED HEALTH CARE EDUCATION/TRAINING PROGRAM

## 2022-08-21 PROCEDURE — 70496 CT ANGIOGRAPHY HEAD: CPT

## 2022-08-21 PROCEDURE — 80053 COMPREHEN METABOLIC PANEL: CPT

## 2022-08-21 PROCEDURE — 96374 THER/PROPH/DIAG INJ IV PUSH: CPT

## 2022-08-21 PROCEDURE — 85025 COMPLETE CBC W/AUTO DIFF WBC: CPT

## 2022-08-21 PROCEDURE — 84484 ASSAY OF TROPONIN QUANT: CPT

## 2022-08-21 PROCEDURE — 99285 EMERGENCY DEPT VISIT HI MDM: CPT

## 2022-08-21 RX ORDER — PROCHLORPERAZINE EDISYLATE 5 MG/ML
10 INJECTION INTRAMUSCULAR; INTRAVENOUS ONCE
Status: DISCONTINUED | OUTPATIENT
Start: 2022-08-21 | End: 2022-08-21

## 2022-08-21 RX ORDER — DIPHENHYDRAMINE HYDROCHLORIDE 50 MG/ML
25 INJECTION INTRAMUSCULAR; INTRAVENOUS ONCE
Status: COMPLETED | OUTPATIENT
Start: 2022-08-21 | End: 2022-08-21

## 2022-08-21 RX ORDER — 0.9 % SODIUM CHLORIDE 0.9 %
500 INTRAVENOUS SOLUTION INTRAVENOUS ONCE
Status: DISCONTINUED | OUTPATIENT
Start: 2022-08-21 | End: 2022-08-22 | Stop reason: HOSPADM

## 2022-08-21 RX ORDER — LIDOCAINE HYDROCHLORIDE AND EPINEPHRINE 10; 10 MG/ML; UG/ML
20 INJECTION, SOLUTION INFILTRATION; PERINEURAL ONCE
Status: DISCONTINUED | OUTPATIENT
Start: 2022-08-22 | End: 2022-08-22 | Stop reason: HOSPADM

## 2022-08-21 RX ORDER — ACETAMINOPHEN 325 MG/1
650 TABLET ORAL ONCE
Status: COMPLETED | OUTPATIENT
Start: 2022-08-21 | End: 2022-08-21

## 2022-08-21 RX ADMIN — ACETAMINOPHEN 650 MG: 325 TABLET, FILM COATED ORAL at 22:23

## 2022-08-21 RX ADMIN — DIPHENHYDRAMINE HYDROCHLORIDE 25 MG: 50 INJECTION, SOLUTION INTRAMUSCULAR; INTRAVENOUS at 21:51

## 2022-08-21 RX ADMIN — Medication 500 ML: at 21:49

## 2022-08-21 RX ADMIN — IOPAMIDOL 75 ML: 755 INJECTION, SOLUTION INTRAVENOUS at 23:27

## 2022-08-21 ASSESSMENT — ENCOUNTER SYMPTOMS
ABDOMINAL PAIN: 0
SHORTNESS OF BREATH: 1

## 2022-08-21 ASSESSMENT — PAIN - FUNCTIONAL ASSESSMENT: PAIN_FUNCTIONAL_ASSESSMENT: 0-10

## 2022-08-21 ASSESSMENT — PAIN DESCRIPTION - LOCATION: LOCATION: HEAD;CHEST

## 2022-08-21 ASSESSMENT — PAIN SCALES - GENERAL: PAINLEVEL_OUTOF10: 2

## 2022-08-22 ENCOUNTER — TELEPHONE (OUTPATIENT)
Dept: INTERNAL MEDICINE CLINIC | Age: 62
End: 2022-08-22

## 2022-08-22 VITALS
DIASTOLIC BLOOD PRESSURE: 75 MMHG | BODY MASS INDEX: 27.44 KG/M2 | SYSTOLIC BLOOD PRESSURE: 165 MMHG | WEIGHT: 170 LBS | TEMPERATURE: 98 F | OXYGEN SATURATION: 99 % | HEART RATE: 62 BPM | RESPIRATION RATE: 18 BRPM

## 2022-08-22 LAB
EKG ATRIAL RATE: 76 BPM
EKG DIAGNOSIS: NORMAL
EKG P AXIS: 40 DEGREES
EKG P-R INTERVAL: 212 MS
EKG Q-T INTERVAL: 384 MS
EKG QRS DURATION: 80 MS
EKG QTC CALCULATION (BAZETT): 432 MS
EKG R AXIS: 34 DEGREES
EKG T AXIS: 29 DEGREES
EKG VENTRICULAR RATE: 76 BPM
MAGNESIUM: 1.5 MG/DL (ref 1.8–2.4)

## 2022-08-22 NOTE — DISCHARGE INSTRUCTIONS
You were seen in the ED for your headache and blurry vision. The CT scan of your head was reassuring. We have a low suspicion for subarachnoid hemorrhage but the only way to rule it out is with a lumbar puncture. Please follow-up with your primary care provider for further work-up of your symptoms. Follow-up with your eye doctor regarding your vision changes. Stay hydrated.   Isolate according to CDC guidelines for COVID  Return to the ED with new or worsening symptoms of vision changes, the worst headache of your life, new dizziness, nausea, vomiting, lightheadedness, SOB, or new or worsening chest pain

## 2022-08-22 NOTE — ED PROVIDER NOTES
[facility] RESIDENT NOTE       Date of evaluation: 8/21/2022    Chief Complaint     Positive For Covid-19 (Since fri), Chest Pain (Ongoing since wed), and Blurred Vision      History of Present Illness     Kanwal Larsen is a 60 y.o. female with PMH of HTN,  who presents to the ED with headache blurred vision since Wednesday.,  She has many other complaints. She says she nausea, diarrhea on Friday. States she has chest pain that has been going on for weeks. She saw her cardiologist Dr. Leyla Rae and he ordered a stress test, which she had on Wednesday. It was normal.  She is mostly concerned with her blurry vision and headaches that have been going on since Wednesday. She states that everything has shadows around it. She has a history of diabetic retinopathy the left. She sees an ophthalmologist for. She states her headache is at a 6. She states she has a history of a subarachnoid hemorrhage in 2003. She states that her headaches are different, in terms of location and severity. She tested positive for COVID in 8/19 she was prescribed Paxlovid but she has not started it yet. Review of Systems     Review of Systems   Constitutional:  Positive for appetite change. Respiratory:  Positive for shortness of breath. Cardiovascular:  Positive for chest pain. Gastrointestinal:  Negative for abdominal pain. Genitourinary:  Negative for difficulty urinating. Neurological:  Positive for dizziness and headaches. Psychiatric/Behavioral:  Positive for confusion.       Past Medical, Surgical, Family, and Social History     She has a past medical history of Anxiety and depression, Asthma, AV block, 1st degree, B12 deficiency, Balance disorder, CAD (coronary artery disease), Chronic ischemic heart disease, CVA (cerebral vascular accident) ([facility] [address] Diabetic polyneuropathy associated with type 2 diabetes mellitus ([facility] [address] Diabetic retinopathy ([address] Essential (primary) hypertension, GERD (gastroesophageal reflux disease), H/O heart artery stent, H/O total hysterectomy, Hyperparathyroidism ([address] Hypothyroidism, Leukemia ([address] Low back pain, Mild intermittent asthma without complication, Muscle spasms of neck, CORONA (nonalcoholic steatohepatitis), CHRIS (obstructive sleep apnea), Osteoarthritis, Osteopenia, Other hyperlipidemia, PSVT (paroxysmal supraventricular tachycardia) ([address] Restless leg syndrome, Sleep apnea, Subarachnoid hemorrhage ([address] and Type 2 diabetes mellitus with complications ([address] She has a past surgical history that includes Total abdominal hysterectomy w/ bilateral salpingoophorectomy; Cholecystectomy, laparoscopic; Abdominal exploration surgery; liver biopsy; other surgical history (Right); Upper gastrointestinal endoscopy (N/A, 05/27/2022); and Colonoscopy (N/A, 05/27/2022). Her family history includes Anxiety Disorder in her daughter; Asthma in her mother; Depression in her daughter; Heart Disease in her father; Heart Failure in her mother; Lesleigh Keas in her father; Mental Illness in her paternal grandmother; Other in her brother; Suicide in her nephew. She reports that she has never smoked. She has never used smokeless tobacco. She reports that she does not drink alcohol and does not use drugs. Medications     Discharge Medication List as of 8/22/2022  1:14 AM        CONTINUE these medications which have NOT CHANGED    Details   nirmatrelvir/ritonavir (PAXLOVID) 20 x 150 MG & 10 x 100MG TBPK Take 3 tablets (two 150 mg nirmatrelvir and one 100 mg ritonavir tablets) by mouth every 12 hours for 5 days. , Disp-30 tablet, R-0Normal      insulin glargine (LANTUS SOLOSTAR) 100 UNIT/ML injection pen Inject 25 Units into the skin nightly, Disp-5 pen, R-0Adjust Sig      traZODone (DESYREL) 50 MG tablet Take 1 tablet by mouth nightly, Disp-30 tablet, R-2Normal      !!  DULoxetine (CYMBALTA) 30 MG extended release capsule Take 1 capsule by mouth in the morning. Take with the 60mg capsule for a total of 90mg., Disp-90 capsule, R-1Normal      gabapentin (NEURONTIN) 800 MG tablet Take 1 tablet by mouth in the morning and 1 tablet before bedtime. Do all this for 90 days. TAKE 1 TABLET BY MOUTH  TWICE DAILY. , Disp-180 tablet, R-0Normal      furosemide (LASIX) 20 MG tablet TAKE ONE TABLET DAILY AS NEEDED FOR EDEMA, Disp-30 tablet, R-3Normal      isosorbide mononitrate (IMDUR) 30 MG extended release tablet Take 1 tablet by mouth in the morning. ., Disp-30 tablet, R-3Normal      !!  DULoxetine (CYMBALTA) 60 MG extended release capsule TAKE 1 CAPSULE BY MOUTH  DAILY, Disp-90 capsule, R-1Normal      atorvastatin (LIPITOR) 40 MG tablet TAKE 1 TABLET BY MOUTH  DAILY, Disp-90 tablet, R-1Normal      metFORMIN (GLUCOPHAGE) 500 MG tablet TAKE 2 TABLETS BY MOUTH  TWICE DAILY, Disp-360 tablet, R-1Normal      pantoprazole (PROTONIX) 40 MG tablet TAKE 1 TABLET BY MOUTH  DAILY, Disp-90 tablet, R-1Normal      empagliflozin (JARDIANCE) 25 MG tablet Take 1 tablet by mouth daily, Disp-30 tablet, R-5Normal      levothyroxine (SYNTHROID) 50 MCG tablet TAKE ONE TABLET BY MOUTH ON MONDAY THROUGH FRIDAY AND 2 TABLETS BY MOUTH ON SATURDAY AND SUNDAY, Disp-90 tablet, R-1Normal      Blood Glucose Monitoring Suppl (ONE TOUCH ULTRA 2) w/Device KIT DAILY Starting Wed 6/15/2022, Disp-1 kit, R-0, Normal      Lancets MISC 2 TIMES DAILY Starting Wed 6/15/2022, Disp-100 each, R-5, Normal      blood glucose monitor strips Test 2 times a day & as needed for symptoms of irregular blood glucose, Disp-100 strip, R-5, Normal      magnesium oxide (MAG-OX) 400 (240 Mg) MG tablet Take 1 tablet by mouth 2 times daily, Disp-60 tablet, R-3Normal      Insulin Pen Needle 32G X 5 MM MISC 5 TIMES DAILY Starting Mon 5/23/2022, Disp-100 each, R-11, Normal      metoprolol succinate (TOPROL XL) 25 MG extended release tablet Take 1 tablet by mouth daily, Disp-30 tablet, R-5Normal      Ascorbic Acid (VITAMIN C) 1000 MG tablet Historical Med      aspirin 81 MG EC tablet Take 81 mg by mouth dailyHistorical Med      Calcium Carbonate-Vitamin D (OYSTER SHELL CALCIUM/D) 500-200 MG-UNIT TABS Take 1 tablet by mouth 2 times daily (with meals)Historical Med      Cholecalciferol (VITAMIN D3) 125 MCG (5000 UT) CAPS Historical Med      cyanocobalamin 250 MCG tablet Take 250 mcg by mouth dailyHistorical Med      Melatonin 10 MG SUBL Historical Med      methocarbamol (ROBAXIN) 500 MG tablet Take 500 mg by mouth 3 times daily as neededHistorical Med      albuterol sulfate  (90 Base) MCG/ACT inhaler Inhale 2 puffs into the lungs every 6 hours as needed for Wheezing, Disp-18 g, R-5Normal      insulin lispro, 1 Unit Dial, (HUMALOG KWIKPEN) 100 UNIT/ML SOPN Use up  To 35 units daily per sliding scale, Disp-10 pen, R-1NO PRINT      cetirizine (ZYRTEC ALLERGY) 10 MG tablet Take 1 tablet by mouth dailyOTC       !! - Potential duplicate medications found. Please discuss with provider. Allergies     She is allergic to iodides, nitrofurantoin, nitrofurantoin macrocrystal, cisapride, fexofenadine-pseudoephed er, metoclopramide, codeine, oxycodone, oxycodone-acetaminophen, sulfamethoxazole-trimethoprim, ciprofloxacin, and clarithromycin. Physical Exam     INITIAL VITALS: BP: (!) 147/89, Temp: 98.3 °F (36.8 °C), Heart Rate: 80, Resp: 18, SpO2: 99 %   Physical Exam  Constitutional:       Appearance: Normal appearance. HENT:      Head: Normocephalic and atraumatic. Cardiovascular:      Rate and Rhythm: Normal rate. Abdominal:      General: Abdomen is flat. Palpations: Abdomen is soft. Skin:     General: Skin is warm and dry. Neurological:      General: No focal deficit present. Mental Status: She is alert.    Psychiatric:         Mood and Affect: Mood normal.       Diagnostic Results     EKG   Interpreted inconjunction with emergency department physician Millie Lee MD  Rhythm: normal sinus   Rate: normal  Axis: normal  Ectopy: none  Conduction: normal  ST Segments: no acute change  T Waves: no acute change  Q Waves: none  Clinical Impression: no acute changes  Comparison:  5/5/22    RADIOLOGY:  CTA VENOUS HEAD W WO CONTRAST   Final Result      1. No acute intracranial hemorrhage or mass effect. LABS:   Results for orders placed or performed during the hospital encounter of 08/21/22   CBC with Auto Differential   Result Value Ref Range    WBC 3.2 (L) 4.0 - 11.0 K/uL    RBC 4.08 4.00 - 5.20 M/uL    Hemoglobin 12.1 12.0 - 16.0 g/dL    Hematocrit 36.5 36.0 - 48.0 %    MCV 89.5 80.0 - 100.0 fL    MCH 29.7 26.0 - 34.0 pg    MCHC 33.2 31.0 - 36.0 g/dL    RDW 12.9 12.4 - 15.4 %    Platelets 833 412 - 498 K/uL    MPV 9.2 5.0 - 10.5 fL    Neutrophils % 47.7 %    Lymphocytes % 40.7 %    Monocytes % 8.7 %    Eosinophils % 2.4 %    Basophils % 0.5 %    Neutrophils Absolute 1.5 (L) 1.7 - 7.7 K/uL    Lymphocytes Absolute 1.3 1.0 - 5.1 K/uL    Monocytes Absolute 0.3 0.0 - 1.3 K/uL    Eosinophils Absolute 0.1 0.0 - 0.6 K/uL    Basophils Absolute 0.0 0.0 - 0.2 K/uL   CMP   Result Value Ref Range    Sodium 139 136 - 145 mmol/L    Potassium 4.1 3.5 - 5.1 mmol/L    Chloride 101 99 - 110 mmol/L    CO2 26 21 - 32 mmol/L    Anion Gap 12 3 - 16    Glucose 149 (H) 70 - 99 mg/dL    BUN 14 7 - 20 mg/dL    Creatinine 1.0 0.6 - 1.2 mg/dL    GFR Non- 56 (A) >60    GFR African American >60 >60    Calcium 9.3 8.3 - 10.6 mg/dL    Total Protein 6.2 (L) 6.4 - 8.2 g/dL    Albumin 4.2 3.4 - 5.0 g/dL    Albumin/Globulin Ratio 2.1 1.1 - 2.2    Total Bilirubin 0.5 0.0 - 1.0 mg/dL    Alkaline Phosphatase 70 40 - 129 U/L    ALT 23 10 - 40 U/L    AST 21 15 - 37 U/L   Troponin   Result Value Ref Range    Troponin <0.01 <0.01 ng/mL   Magnesium   Result Value Ref Range    Magnesium 1.50 (L) 1.80 - 2.40 mg/dL       ED BEDSIDE ULTRASOUND:  No results found.     RECENT VITALS:  BP: (!) 165/75, Temp: 98 °F (36.7 °C),Heart Rate: 62, Resp: 18, Intractable headache, unspecified chronicity pattern, unspecified headache type        Disposition     PATIENT REFERRED TO:  Daniel Royal MD  [address]    Schedule an appointment as soon as possible for a visit       [address] [place] [address] Dr:  Discharge Medication List as of 8/22/2022  1:14 AM          DISPOSITION Decision To Discharge 08/22/2022 12:30:15 AM       Uday Alba MD  Resident  08/22/22 [id number]

## 2022-08-22 NOTE — TELEPHONE ENCOUNTER
Patient is requesting an appointment for concerns of COVID and/or possible stroke. She went to ER yesterday and tested again for COVID and was negative. Sxs include: Vision change, Headache, unable to focus, confusion, foggy, chest pains, abdominal pain and flank pain, infection on her right wrist, pink and a hard knot under the skin. Very tender to he touch. Magnesium level is really low. No availability. Please advise on scheduling.        ----- Message from Louise Phelps sent at 8/22/2022  2:34 PM EDT -----  Subject: Appointment Request    Reason for Call: Established Patient Appointment needed: Urgent (Patient   Request) ED Follow Up Visit    QUESTIONS    Reason for appointment request? Available appointments did not meet   patient need     Additional Information for Provider? patient was positive for Covid on   08/19/22. She went to the ER on 08/21/2022 [facility] - headache and change in   her vision, scans were negative for any bleeds- first available appt   09/09/22 please review and contact patient to schedule.  Please contact   ---------------------------------------------------------------------------  --------------  Maria Alejandra Kenyon INFO  [phone]; OK to leave message on voicemail  ---------------------------------------------------------------------------  --------------  SCRIPT ANSWERS  COVID Screen: Red

## 2022-08-23 ENCOUNTER — HOSPITAL ENCOUNTER (OUTPATIENT)
Dept: PHYSICAL THERAPY | Age: 62
Setting detail: THERAPIES SERIES
Discharge: HOME | End: 2022-08-23
Payer: MEDICAID

## 2022-08-23 NOTE — TELEPHONE ENCOUNTER
Spoke with patient, she said she is feeling a little better. Her covid test was negative yesterday. She has a fever 101. 1. she declined an appointment for tomorrow that was offered. She will call her eye doctor to get an appointment set up with them tog et her glasses checked out.

## 2022-08-23 NOTE — CARE COORDINATION
[facility] Outpatient Therapy  [address], [address]  Phone: [phone]   Fax: [phone]    Physical Therapy Missed Visit Note     Date:  2022    Patient Name:  Stephane Venegas      :  1960    MRN: [id number]      Cancelled visits to date: 4  2022  No-shows to date: 1  2022    For today's appointment patient:  [x]  Cancelled  []  Rescheduled appointment  []  No-show     Reason given by patient:  []  Patient ill  []  Conflicting appointment  []  No transportation    []  Conflict with work  []  No reason given  [x]  Other:     Comments:  sick with Covid    Electronically signed by:   Stephen Lemons, DPT [id number]

## 2022-08-23 NOTE — TELEPHONE ENCOUNTER
I can do a video visit at 2:30 8/25.   (I dont see in Epic that another Covid test was done in ED. Her Magnesium is actually better than it had been. She had brain imaging in ED which was negative for stroke.)  Did she take the Paxlovid?  Has she contacted her eye doctor (apparently has new rx for glasses)

## 2022-08-23 NOTE — TELEPHONE ENCOUNTER
Tell her If her fever persists, she should return to ER. ER had given some consideration to doing a lumbar puncture due to her headache and vision changes, so this may still need to be done.

## 2022-08-23 NOTE — CARE COORDINATION
[facility]  [address] [facility] [place] [address], [address]  Phone: [phone]   Fax: [phone]    Physical Therapy Missed Visit Note     Date:  2022    Patient Name:  Rosa Bravo      :  1960    MRN: [id number]      Cancelled visits to date: 3  2022  2022  2022  No-shows to date: 2022    For today's appointment patient:  [x]  Cancelled  []  Rescheduled appointment  []  No-show     Reason given by patient:  []  Patient ill  []  Conflicting appointment  []  No transportation    []  Conflict with work  []  No reason given  [x]  Other:     Comments:  sick with Covid    Electronically signed by:   Stephen Zabala, DPT [id number]

## 2022-08-23 NOTE — TELEPHONE ENCOUNTER
Fever is 100.1 but feels better otherwise Patient refuses to go to the ER (\"I would rather die than go back to the ER\")

## 2022-08-23 NOTE — TELEPHONE ENCOUNTER
Please see how she is doing. Perhaps by now she is improving and in a Mychart message I had also asked her to call her eye doctor. If still needs appt, then please offer her tomorrow at 330 (I had earlier said to block that spot but ok to give it to her). I think it should be VV since tested positive for Covid on 8/19.

## 2022-08-26 ENCOUNTER — HOSPITAL ENCOUNTER (OUTPATIENT)
Dept: PHYSICAL THERAPY | Age: 62
Setting detail: THERAPIES SERIES
Discharge: HOME | End: 2022-08-26
Payer: MEDICAID

## 2022-08-28 PROBLEM — E66.3 OVERWEIGHT (BMI 25.0-29.9): Status: ACTIVE | Noted: 2022-08-28
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
## 2022-08-29 ENCOUNTER — APPOINTMENT (OUTPATIENT)
Dept: PHYSICAL THERAPY | Age: 62
End: 2022-08-29
Payer: MEDICAID

## 2022-08-29 ENCOUNTER — OFFICE VISIT (OUTPATIENT)
Dept: ENDOCRINOLOGY | Age: 62
End: 2022-08-29
Payer: MEDICAID

## 2022-08-29 VITALS
HEART RATE: 79 BPM | SYSTOLIC BLOOD PRESSURE: 122 MMHG | WEIGHT: 175 LBS | RESPIRATION RATE: 14 BRPM | BODY MASS INDEX: 28.12 KG/M2 | OXYGEN SATURATION: 97 % | DIASTOLIC BLOOD PRESSURE: 76 MMHG | TEMPERATURE: 98 F | HEIGHT: 66 IN

## 2022-08-29 DIAGNOSIS — E66.3 OVERWEIGHT (BMI 25.0-29.9): ICD-10-CM

## 2022-08-29 DIAGNOSIS — I25.10 CORONARY ARTERY DISEASE INVOLVING NATIVE CORONARY ARTERY OF NATIVE HEART WITHOUT ANGINA PECTORIS: ICD-10-CM

## 2022-08-29 DIAGNOSIS — G47.33 OSA (OBSTRUCTIVE SLEEP APNEA): ICD-10-CM

## 2022-08-29 DIAGNOSIS — R22.1 NECK MASS: ICD-10-CM

## 2022-08-29 DIAGNOSIS — I10 PRIMARY HYPERTENSION: ICD-10-CM

## 2022-08-29 DIAGNOSIS — E04.2 MULTIPLE THYROID NODULES: ICD-10-CM

## 2022-08-29 DIAGNOSIS — K75.81 NASH (NONALCOHOLIC STEATOHEPATITIS): ICD-10-CM

## 2022-08-29 DIAGNOSIS — E78.49 OTHER HYPERLIPIDEMIA: ICD-10-CM

## 2022-08-29 DIAGNOSIS — E03.9 ACQUIRED HYPOTHYROIDISM: ICD-10-CM

## 2022-08-29 PROCEDURE — 99204 OFFICE O/P NEW MOD 45 MIN: CPT | Performed by: INTERNAL MEDICINE

## 2022-08-29 PROCEDURE — 2022F DILAT RTA XM EVC RTNOPTHY: CPT | Performed by: INTERNAL MEDICINE

## 2022-08-29 PROCEDURE — G8427 DOCREV CUR MEDS BY ELIG CLIN: HCPCS | Performed by: INTERNAL MEDICINE

## 2022-08-29 PROCEDURE — G8419 CALC BMI OUT NRM PARAM NOF/U: HCPCS | Performed by: INTERNAL MEDICINE

## 2022-08-29 PROCEDURE — 3051F HG A1C>EQUAL 7.0%<8.0%: CPT | Performed by: INTERNAL MEDICINE

## 2022-08-29 PROCEDURE — 1036F TOBACCO NON-USER: CPT | Performed by: INTERNAL MEDICINE

## 2022-08-29 PROCEDURE — 95251 CONT GLUC MNTR ANALYSIS I&R: CPT | Performed by: INTERNAL MEDICINE

## 2022-08-29 PROCEDURE — 3017F COLORECTAL CA SCREEN DOC REV: CPT | Performed by: INTERNAL MEDICINE

## 2022-08-29 RX ORDER — INSULIN GLARGINE 100 [IU]/ML
28 INJECTION, SOLUTION SUBCUTANEOUS NIGHTLY
Qty: 15 PEN | Refills: 1 | Status: SHIPPED | OUTPATIENT
Start: 2022-08-29 | End: 2022-09-12

## 2022-08-29 RX ORDER — GABAPENTIN 800 MG/1
1600 TABLET ORAL NIGHTLY
Qty: 180 TABLET | Refills: 0
Start: 2022-08-29 | End: 2022-09-07

## 2022-08-29 RX ORDER — FLASH GLUCOSE SENSOR
KIT MISCELLANEOUS
COMMUNITY
Start: 2022-08-15

## 2022-08-30 ENCOUNTER — OFFICE VISIT (OUTPATIENT)
Dept: ENDOCRINOLOGY | Age: 62
End: 2022-08-30
Payer: MEDICAID

## 2022-08-30 ENCOUNTER — HOSPITAL ENCOUNTER (OUTPATIENT)
Dept: PHYSICAL THERAPY | Age: 62
Setting detail: THERAPIES SERIES
Discharge: HOME | End: 2022-08-30
Payer: MEDICAID

## 2022-08-30 DIAGNOSIS — I10 PRIMARY HYPERTENSION: ICD-10-CM

## 2022-08-30 DIAGNOSIS — E03.9 ACQUIRED HYPOTHYROIDISM: ICD-10-CM

## 2022-08-30 DIAGNOSIS — R42 DIZZINESS: ICD-10-CM

## 2022-08-30 DIAGNOSIS — E78.49 OTHER HYPERLIPIDEMIA: ICD-10-CM

## 2022-08-30 DIAGNOSIS — K75.81 NASH (NONALCOHOLIC STEATOHEPATITIS): ICD-10-CM

## 2022-08-30 DIAGNOSIS — G47.33 OSA (OBSTRUCTIVE SLEEP APNEA): ICD-10-CM

## 2022-08-30 DIAGNOSIS — I25.10 CORONARY ARTERY DISEASE INVOLVING NATIVE CORONARY ARTERY OF NATIVE HEART WITHOUT ANGINA PECTORIS: ICD-10-CM

## 2022-08-30 DIAGNOSIS — R74.01 TRANSAMINITIS: ICD-10-CM

## 2022-08-30 DIAGNOSIS — E04.2 MULTIPLE THYROID NODULES: ICD-10-CM

## 2022-08-30 LAB
A/G RATIO: 1.8 (ref 1.1–2.2)
ALBUMIN SERPL-MCNC: 4.1 G/DL (ref 3.4–5)
ALP BLD-CCNC: 74 U/L (ref 40–129)
ALT SERPL-CCNC: 24 U/L (ref 10–40)
ANION GAP SERPL CALCULATED.3IONS-SCNC: 12 MMOL/L (ref 3–16)
ANTI-THYROGLOB ABS: 13 IU/ML
AST SERPL-CCNC: 24 U/L (ref 15–37)
BILIRUB SERPL-MCNC: 0.4 MG/DL (ref 0–1)
BILIRUBIN DIRECT: <0.2 MG/DL (ref 0–0.3)
BILIRUBIN, INDIRECT: NORMAL MG/DL (ref 0–1)
BUN BLDV-MCNC: 19 MG/DL (ref 7–20)
CALCIUM SERPL-MCNC: 8.9 MG/DL (ref 8.3–10.6)
CHLORIDE BLD-SCNC: 108 MMOL/L (ref 99–110)
CHOLESTEROL, TOTAL: 194 MG/DL (ref 0–199)
CO2: 26 MMOL/L (ref 21–32)
CREAT SERPL-MCNC: 1 MG/DL (ref 0.6–1.2)
CREATININE URINE: 128.4 MG/DL (ref 28–259)
GFR AFRICAN AMERICAN: >60
GFR NON-AFRICAN AMERICAN: 56
GLUCOSE BLD-MCNC: 155 MG/DL (ref 70–99)
HCT VFR BLD CALC: 38.1 % (ref 36–48)
HDLC SERPL-MCNC: 58 MG/DL (ref 40–60)
HEMOGLOBIN: 12.6 G/DL (ref 12–16)
LDL CHOLESTEROL CALCULATED: 107 MG/DL
MCH RBC QN AUTO: 29.9 PG (ref 26–34)
MCHC RBC AUTO-ENTMCNC: 33.2 G/DL (ref 31–36)
MCV RBC AUTO: 89.9 FL (ref 80–100)
MICROALBUMIN UR-MCNC: <1.2 MG/DL
MICROALBUMIN/CREAT UR-RTO: NORMAL MG/G (ref 0–30)
PDW BLD-RTO: 13.5 % (ref 12.4–15.4)
PLATELET # BLD: 184 K/UL (ref 135–450)
PMV BLD AUTO: 9.3 FL (ref 5–10.5)
POTASSIUM SERPL-SCNC: 4.2 MMOL/L (ref 3.5–5.1)
RBC # BLD: 4.23 M/UL (ref 4–5.2)
SODIUM BLD-SCNC: 146 MMOL/L (ref 136–145)
T3 FREE: 2.2 PG/ML (ref 2.3–4.2)
T4 FREE: 1.4 NG/DL (ref 0.9–1.8)
THYROID PEROXIDASE (TPO) ABS: 8 IU/ML
TOTAL PROTEIN: 6.4 G/DL (ref 6.4–8.2)
TRIGL SERPL-MCNC: 146 MG/DL (ref 0–150)
TSH SERPL DL<=0.05 MIU/L-ACNC: 1.75 UIU/ML (ref 0.27–4.2)
VLDLC SERPL CALC-MCNC: 29 MG/DL
WBC # BLD: 2.7 K/UL (ref 4–11)

## 2022-08-30 PROCEDURE — G8419 CALC BMI OUT NRM PARAM NOF/U: HCPCS | Performed by: DIETITIAN, REGISTERED

## 2022-08-30 PROCEDURE — 3051F HG A1C>EQUAL 7.0%<8.0%: CPT | Performed by: DIETITIAN, REGISTERED

## 2022-08-30 PROCEDURE — G8427 DOCREV CUR MEDS BY ELIG CLIN: HCPCS | Performed by: DIETITIAN, REGISTERED

## 2022-08-30 PROCEDURE — 97110 THERAPEUTIC EXERCISES: CPT

## 2022-08-30 PROCEDURE — 97802 MEDICAL NUTRITION INDIV IN: CPT | Performed by: DIETITIAN, REGISTERED

## 2022-08-30 NOTE — PROGRESS NOTES
[facility] Outpatient Therapy  [address], [address], [address]  Phone: [phone]   Fax: [phone]    Physical Therapy Treatment Note/ Progress Report:     Date:  2022    Patient Name:  Indiana Chowdary    :  1960  MRN: [id number]  Medical/Treatment Diagnosis Information:  Diagnosis: Neck pain (M54.2)  Treatment Diagnosis: Neck pain E39.6  Insurance/Certification information:  PT Insurance Information: [facility] [place], [place] required  Physician Information:    Obed Zhang MD    Plan of care signed:    [x] Yes  [] No    Date of Patient follow up with Physician: Sept     Progress Report: [x]  Yes  []  No     Date Range for reporting period:  Beginnin2022  Endin2022  7 visits attended, 4 cancels, 1 no show    Progress report due (10 Rx/or 30 days whichever is less):      Recertification due (POC duration/ or 90 days whichever is less):      Visit # Insurance Allowable Auth Needed    + 4/10 12 visits 2022 - 10/03/2022 (mech traction, US, TE, NMR, MT, TA) [x]Yes   []No     RESTRICTIONS/PRECAUTIONS: asthma, chronic ischemic heart disease, CAD - stent placed, CVA, DM, diabetic polyneuropathy, HTN, Hx leukemia, OA, anxiety, depression, AV block, osteoporosis, PSVT, fibromyalgia, dizziness/vertigo - orthostatic hypotension, R radial head fx sx, balance issues, memory problems  Latex Allergy:  [x]NO      []YES  Preferred Language for Healthcare:   [x]English       []other:  Functional Scale: FOTO neck Date assessed:2022    Pain level:  2/10 neck pain at rest    SUBJECTIVE:  Pt not seen recently due to had Covid. Neck has been okay, feels more achy on L side again, not constant. If lays in bed or on the couch and neck is not positioned correctly will cause pain. Has been using Biofreeze which helps. Pain at worst 2/10 recently.   Hasn't been able to do HEP much recently due to fatigue from Covid but that is improving so plans to resume HEP again. Overall HAs have been better with decreased intensity and frequency. OBJECTIVE:  Cervical AROM flexion WFLs without pain, ext 25% stiffness, L rot 75% with stiffness, R rot 60% with stiffness. AROM B shoulder flexion/abduction/ER reach/IR reach [facility] without pain. Strength B shoulder flexion 4-/5 no pain, B shoulder abduction 4/5 no pain, B shoulder ER 4/5 no pain. FOTO neck = 51. Exercises/Interventions: Exercises in bold performed in department today. Items not bolded are carried forward from prior visits for continuity of the record. Exercise/Equipment Resistance/Repetitions Other comments   UT stretch 15\" x 3 each B    Levator stretch 15\" x 3 each B    Posterior capsule stretch 15\" x 3 each B    Chin tuck X 10    scap retraction X 10    Corner pec stretch 15\" x 3  Cues for technique   T-band rows/ext Yellow x 10 each                                                             Home Exercise Program:Pt. demonstrated good understanding and knowledge of HEP. Written instructions provided. 06/21/2022: UT and levator stretches. Access Code [email or web address]  07/14/2022: chin tuck, posterior capsule stretch. Access Code [facility]  07/26/2022: scap retraction, corner pec stretch. Access Code [facility]  08/02/2022: encouraged use of moist heat as helpful  08/16/2022: t-band rows/ext. Access Code [id number]    Therapeutic Exercise:   [x] (63575) Provided verbal/tactile cueing for activities related to strengthening, flexibility, endurance, ROM  for improvements in scapular, scapulothoracic and UE control with self care, reaching, carrying, lifting, house/yardwork, driving/computer work.        NMR:   [] (72119) Provided verbal/tactile cueing for activities related to improving balance, coordination, kinesthetic sense, posture, motor skill, proprioception  to assist with  scapular, scapulothoracic and UE control with self care, reaching, carrying, lifting, house/yardwork, driving/computer work. Therapeutic Activities:    [] (37123) Provided verbal/tactile cueing for activities related to improving balance, coordination, kinesthetic sense, posture, motor skill, proprioception and motor activation to allow for proper function of scapular, scapulothoracic and UE control with self care, carrying, lifting, driving/computer work. Home Exercise Program:    [x] (26658) Reviewed/Progressed HEP activities related to strengthening, flexibility, endurance, ROM of scapular, scapulothoracic and UE control with self care, reaching, carrying, lifting, house/yardwork, driving/computer work.   [] (49502) Reviewed/Progressed HEP activities related to improving balance, coordination, kinesthetic sense, posture, motor skill, proprioception of scapular, scapulothoracic and UE control with self care, reaching, carrying, lifting, house/yardwork, driving/computer work. Manual Treatments:  PROM / STM / Oscillations-Mobs:  G-I, II, III, IV (PA's, Inf., Post.)  [] (42028) Provided manual therapy to mobilize soft tissue/joints of cervical/CT, scapular GHJ and UE for the purpose of modulating pain, promoting relaxation,  increasing ROM, reducing/eliminating soft tissue swelling/inflammation/restriction, improving soft tissue extensibility and allowing for proper ROM for normal function with self care, reaching, carrying, lifting, house/yardwork, driving/computer work. STM to B cervical paraspinals and B UT with pt in prone and good tolerance reported. Manual B scapular depression stretch with pt in prone and good stretch noted.   - held per pt request as felt she was doing better    Modalities:    [] Electric Stimulation:   [] Ultrasound:   [] Other:       Charges:  Timed Code Treatment Minutes: TE: 27   Total Treatment Minutes: 27      [] EVAL (LOW) 22822 (typically 20 minutes face-to-face)  [] EVAL (MOD) 03253 (typically 30 minutes face-to-face)  [] EVAL (HIGH) 90801 (typically 45 minutes face-to-face)  [] Coleman Bosworth     [x] VX(06352) x  2    [] NMR (49948) x       [] Manual (47325) x     [] TA (80020) x         [] ES(attended) (61008)   [] Mech Traction (20211)  [] Ultrasound (96830)  [] Other:    GOALS: assessed 08/30/2022    Patient stated goal: \"flexibility and decrease of pain\"  [] Progressing: [x] Met: [] Not Met: [] Adjusted     Therapist goals for Patient:   Short Term Goals: To be achieved in: 3 weeks  - Independent in initial HEP per patient tolerance, in order to prevent re-injury. [] Progressing: [x] Met: [] Not Met: [] Adjusted  - Patient will have a decrease in pain 6/10 at worst for improved tolerance to sleeping and position changes. [] Progressing: [x] Met: [] Not Met: [] Adjusted     Long Term Goals: To be achieved in: 6 weeks  - Disability index score of 52 or better on FOTO neck to assist with reaching prior level of function. [x] Progressing: [] Met: [] Not Met: [] Adjusted  - Patient will demonstrate increased AROM of cervical spine 90%, B shoulder AROM WFLs to allow for driving and reaching without increased pain   [x] Progressing: [] Met: [] Not Met: [] Adjusted  - Patient will demonstrate an increase in Strength B shoulders 5/5 to allow for lifting, household chores without increased pain      [x] Progressing: [] Met: [] Not Met: [] Adjusted  -Patient will demonstrate an increase in postural awareness to allow for proper functional mobility as indicated by patients Functional Deficits. [x] Progressing: [] Met: [] Not Met: [] Adjusted  - Patient will have a decrease in pain 3/10 at worst and decreased HA 75% for rare complaints with sleeping and position changes. [x] Progressing: [] Met: [] Not Met: [] Adjusted  - Independent in HEP progression per patient tolerance, in order to prevent re-injury. [x] Progressing: [] Met: [] Not Met: [] Adjusted         ASSESSMENT:  Pt making good progress towards goals, recent limitation with having Covid.   Still with mild pain and ROM/strength restrictions but overall improving. Pt will benefit from additional therapy to address deficits to return to PLOF. Recommend continue 2x per week for 3 weeks. Treatment/Activity Tolerance:  [x] Patient tolerated treatment well [] Patient limited by fatique  [] Patient limited by pain  [] Patient limited by other medical complications  [] Other:     Overall Progression Towards Functional goals/ Treatment Progress Update:  [x] Patient is progressing as expected towards functional goals listed. [] Progression is slowed due to complexities/Impairments listed. [] Progression has been slowed due to co-morbidities. [] Plan just implemented, too soon to assess goals progression <30days   [] Goals require adjustment due to lack of progress  [] Patient is not progressing as expected and requires additional follow up with physician  [] Other    Prognosis for POC: [x] Good [] Fair  [] Poor    Patient requires continued skilled intervention: [x] Yes  [] No        PLAN:   [x] Continue per plan of care [] Alter current plan (see comments)  [] Plan of care initiated [] Hold pending MD visit [] Discharge    Electronically signed by: Melvin Andrew PT, DPT [id number]    Note: If patient does not return for scheduled/recommended follow up visits, this note will serve as a discharge from care along with the most recent update on progress.

## 2022-08-30 NOTE — PROGRESS NOTES
Medical Nutrition Therapy for Diabetes    Juliette Pendleton  August 30, 2022      Patient Care Team:  Yogesh Granger MD as PCP - General (Internal Medicine)  Yogesh Granger MD as PCP - [facility] Empaneled Provider  Yudi Day MD as Consulting Physician (Gastroenterology)  Terrell Short MD as Consulting Physician (Psychiatry)    Reason for visit: uncontrolled, type 2 Diabetes    ASSESSMENT/PLAN:   NUTRITION DIAGNOSIS  Initial Visit    #1 Problem: Altered Nutrition-Related Laboratory Values (NC-2.2)  Related to: Endocrine/Diabetes   As Evidenced by: Elevated Plasma glucose and/or HgbA1c levels          #2 Problem: Excessive Carbohydrate Intake (NI-5.8. 2)  Related to: Food-and nutrition-related knowledge deficit concerning appropriate amount of carbohydrate intake  As evidenced by: Estimated carbohydrate intake that is consistently more than the recommended amounts     #3 Problem: Knowledge and Beliefs-3.1                     Food and nutrition deficits    NUTRITION INTERVENTION  Nutrition Prescription: 30 grams carbohydrate per meal with protein and non-starch vegetables  15 gram carbohydrate snacks     Diabetes Education/Counseling included:  Carbohydrate Control, Monitoring, Medications, and Hypoyglycemia prevention/treatment, Benefits of adequate hydration, quality sleep and stress management   Discussed benefits of 3 small meals spaced 4-5 hours apart. Identified rational for including protein with carbohydrate each meal.  Interventions:  Control Carbohydrate Intake using Plate Guide  Encouraged 3 meals, 4-5 hours apart and limit carbohydrate to 30 grams per meal.  Suggested small portions of protein each meal with limited carbohydrate. Recommended carrying glucose tablets.   Handouts: Healthy Eating Guidelines for Diabetes-[employer], Sample menus-[employer], Planning Healthy Meals-[employer], Managing and Preventing Hypogycemia-AND  NUTRITION MONITORING AND EVALUATION  3 meals  30 gram carbohydrate meals with protein  Carry glucose tablets       Patient Active Problem List   Diagnosis    Chronic ischemic heart disease    Diabetic polyneuropathy associated with type 2 diabetes mellitus (HCC)    GERD (gastroesophageal reflux disease)    Leukemia (HCC)    Hypothyroidism    CORONA (nonalcoholic steatohepatitis)    Other hyperlipidemia    Type 2 diabetes mellitus with complications (HCC)    Restless leg syndrome    Mild intermittent asthma without complication    Balance disorder    CRHIS (obstructive sleep apnea)    Coronary artery disease involving native coronary artery of native heart without angina pectoris    Primary hypertension    Palpitations    Moderate episode of recurrent major depressive disorder (HCC)    KARIE (generalized anxiety disorder)    Type 2 diabetes mellitus, uncontrolled, with neuropathy ([facility] [address]    Overweight (BMI 25.0-29. 9)    Multiple thyroid nodules    Neck mass       Current Outpatient Medications   Medication Sig Dispense Refill    Continuous Blood Gluc Sensor ([employer]line AllianceSTYLE RAND 14 DAY SENSOR) MISC       gabapentin (NEURONTIN) 800 MG tablet Take 2 tablets by mouth at bedtime for 90 days. 180 tablet 0    insulin glargine (LANTUS SOLOSTAR) 100 UNIT/ML injection pen Inject 28 Units into the skin nightly 15 pen 1    traZODone (DESYREL) 50 MG tablet Take 1 tablet by mouth nightly 30 tablet 2    DULoxetine (CYMBALTA) 30 MG extended release capsule Take 1 capsule by mouth in the morning. Take with the 60mg capsule for a total of 90mg. 90 capsule 1    furosemide (LASIX) 20 MG tablet TAKE ONE TABLET DAILY AS NEEDED FOR EDEMA 30 tablet 3    isosorbide mononitrate (IMDUR) 30 MG extended release tablet Take 1 tablet by mouth in the morning.  . (Patient not taking: Reported on 8/29/2022) 30 tablet 3    DULoxetine (CYMBALTA) 60 MG extended release capsule TAKE 1 CAPSULE BY MOUTH  DAILY 90 capsule 1    atorvastatin (LIPITOR) 40 MG tablet TAKE 1 TABLET BY MOUTH  DAILY 90 tablet 1 LABA1C 7.6 04/06/2022     Lab Results   Component Value Date    .4 04/06/2022       Lab Results   Component Value Date    CHOL 168 04/06/2022     Lab Results   Component Value Date    TRIG 158 (H) 04/06/2022     Lab Results   Component Value Date    HDL 57 04/06/2022     Lab Results   Component Value Date    LDLCALC 79 04/06/2022     Lab Results   Component Value Date    LABVLDL 32 04/06/2022     No results found for: [facility]    Lab Results   Component Value Date    WBC 3.2 (L) 08/21/2022    HGB 12.1 08/21/2022    HCT 36.5 08/21/2022    MCV 89.5 08/21/2022     08/21/2022       Lab Results   Component Value Date    CREATININE 1.0 08/21/2022    BUN 14 08/21/2022     08/21/2022    K 4.1 08/21/2022     08/21/2022    CO2 26 08/21/2022       Diabetes Medications: Yes  Knows name and dose of prescribed medications Yes  Knows prescribed schedule for medicationsYes  Recent change in medication type/dosage: No  Stores  medications properlyYes  Comments:     Monitoring:   Has BG meter: Yes, CGM  Testing frequency: multiple  Recent results: 86% in range report in data  Hypoglycemia? No    Anthropometric Measurements:   Wt:   Wt Readings from Last 3 Encounters:   08/29/22 175 lb (79.4 kg)   08/21/22 170 lb (77.1 kg)   08/15/22 169 lb 12.8 oz (77 kg)      BMI:   BMI Readings from Last 3 Encounters:   08/29/22 28.25 kg/m²   08/21/22 27.44 kg/m²   08/15/22 27.41 kg/m²     Patient's stated goal weight:   7% Weight loss goal weight:     Physical Activity History:   Physical activity: walking, yard work  Frequency of activity: routine  Duration of activity: periodic  Obstacles to activity: none    Sleep: now using C-pap, good, takes sleep aids    Food and Nutrition History:   Nutrition Awareness/Previous DSMES: No  Number of people in household: 1, [facility]  Frequency of Meals Eaten away from home:a few nights per week  Food Availability Problems  Within the past 12 months, have you worried that your food would run out before you got money to buy more? No  Within the past 12 months, has the food you bought not lasted till the end of the month and you didn't have money to get more? No  Beverage consumption: water - 7 glasses, diet sparkling ice, juice- not often, small portions  Alcohol consumption: No  Usual Food consumption:   Poor appetite, 1 meal / day breakfast always eaten    Barriers:   -none          Follow Up Plan: 4-6 weeks Will remain available. Contact information given.      Referring Provider: Colt Acuña MD  Time spent with patient: 60 minutes

## 2022-08-31 LAB
ESTIMATED AVERAGE GLUCOSE: 139.9 MG/DL
HBA1C MFR BLD: 6.5 %

## 2022-09-01 ENCOUNTER — HOSPITAL ENCOUNTER (OUTPATIENT)
Dept: PHYSICAL THERAPY | Age: 62
Setting detail: THERAPIES SERIES
Discharge: HOME | End: 2022-09-01
Payer: MEDICAID

## 2022-09-01 LAB — C-PEPTIDE: 2.6 NG/ML (ref 1.1–4.4)

## 2022-09-01 PROCEDURE — 97110 THERAPEUTIC EXERCISES: CPT

## 2022-09-01 NOTE — FLOWSHEET NOTE
[facility] Outpatient Therapy  [address], [address], [address]  Phone: [phone]   Fax: [phone]    Physical Therapy Treatment Note/ Progress Report:     Date:  2022    Patient Name:  Lanre Kuo    :  1960  MRN: [id number]  Medical/Treatment Diagnosis Information:  Diagnosis: Neck pain (M54.2)  Treatment Diagnosis: Neck pain C37.1  Insurance/Certification information:  PT Insurance Information: [employer], [place] required  Physician Information:    Cassandra Schilling MD    Plan of care signed:    [x] Yes  [] No    Date of Patient follow up with Physician: Sept     Progress Report: []  Yes  [x]  No     Date Range for reporting period:  Beginnin2022  Ending:  NA    Progress report due (10 Rx/or 30 days whichever is less):      Recertification due (POC duration/ or 90 days whichever is less):      Visit # Insurance Allowable Auth Needed    + 5/10 12 visits 2022 - 10/03/2022 ([facility] traction, US, TE, NMR, MT, TA) [x]Yes   []No     RESTRICTIONS/PRECAUTIONS: asthma, chronic ischemic heart disease, CAD - stent placed, CVA, DM, diabetic polyneuropathy, HTN, Hx leukemia, OA, anxiety, depression, AV block, osteoporosis, PSVT, fibromyalgia, dizziness/vertigo - orthostatic hypotension, R radial head fx sx, balance issues, memory problems  Latex Allergy:  [x]NO      []YES  Preferred Language for Healthcare:   [x]English       []other:  Functional Scale: FOTO neck Date assessed:2022    Pain level:  0/10 neck pain at rest    SUBJECTIVE:  Pt notes she hasn't really had any pain with the exception of FLANNERY last night since last visit. FLANNERY last night wasn't as strong as it usually is. OBJECTIVE:      Exercises/Interventions: Exercises in bold performed in department today. Items not bolded are carried forward from prior visits for continuity of the record.     Exercise/Equipment Resistance/Repetitions Other comments   UT stretch 15\" x 3 each B    Levator stretch 15\" x 3 each B    Posterior capsule stretch 15\" x 3 each B    Chin tuck X 10    scap retraction X 10    Corner pec stretch 15\" x 3  Cues for technique   T-band rows/ext Yellow x 10 each Cues for technique   B ER with scap retraction Yellow x 10    Shoulder flexion t-band Yellow x 10                                                   Home Exercise Program:Pt. demonstrated good understanding and knowledge of HEP. Written instructions provided. 06/21/2022: UT and levator stretches. Access Code [email or web address]  07/14/2022: chin tuck, posterior capsule stretch. Access Code [facility]  07/26/2022: scap retraction, corner pec stretch. Access Code [facility]  08/02/2022: encouraged use of moist heat as helpful  08/16/2022: t-band rows/ext. Access Code [email or web address]  09/01/2022: B ER with scap retraction, shoulder flexion t-band. Access Code [email or web address]    Therapeutic Exercise:   [x] (95426) Provided verbal/tactile cueing for activities related to strengthening, flexibility, endurance, ROM  for improvements in scapular, scapulothoracic and UE control with self care, reaching, carrying, lifting, house/yardwork, driving/computer work. NMR:   [] (04768) Provided verbal/tactile cueing for activities related to improving balance, coordination, kinesthetic sense, posture, motor skill, proprioception  to assist with  scapular, scapulothoracic and UE control with self care, reaching, carrying, lifting, house/yardwork, driving/computer work. Therapeutic Activities:    [] (46731) Provided verbal/tactile cueing for activities related to improving balance, coordination, kinesthetic sense, posture, motor skill, proprioception and motor activation to allow for proper function of scapular, scapulothoracic and UE control with self care, carrying, lifting, driving/computer work.      Home Exercise Program:    [x] (62698) Reviewed/Progressed HEP activities related to strengthening, flexibility, endurance, ROM of scapular, scapulothoracic and UE control with self care, reaching, carrying, lifting, house/yardwork, driving/computer work.   [] (36556) Reviewed/Progressed HEP activities related to improving balance, coordination, kinesthetic sense, posture, motor skill, proprioception of scapular, scapulothoracic and UE control with self care, reaching, carrying, lifting, house/yardwork, driving/computer work. Manual Treatments:  PROM / STM / Oscillations-Mobs:  G-I, II, III, IV (PA's, Inf., Post.)  [] (95609) Provided manual therapy to mobilize soft tissue/joints of cervical/CT, scapular GHJ and UE for the purpose of modulating pain, promoting relaxation,  increasing ROM, reducing/eliminating soft tissue swelling/inflammation/restriction, improving soft tissue extensibility and allowing for proper ROM for normal function with self care, reaching, carrying, lifting, house/yardwork, driving/computer work. STM to B cervical paraspinals and B UT with pt in prone and good tolerance reported. Manual B scapular depression stretch with pt in prone and good stretch noted. - held per pt request as felt she was doing better    Modalities:    [] Electric Stimulation:   [] Ultrasound:   [] Other:       Charges:  Timed Code Treatment Minutes: TE: 28   Total Treatment Minutes: 28      [] EVAL (LOW) 65457 (typically 20 minutes face-to-face)  [] EVAL (MOD) 23082 (typically 30 minutes face-to-face)  [] EVAL (HIGH) 423 [id number] (typically 45 minutes face-to-face)  [] RE-EVAL     [x] RJ(98849) x  2    [] NMR (29130) x       [] Manual (08717) x     [] TA (34425) x         [] ES(attended) (42537)   [] Mech Traction (98992)  [] Ultrasound (69829)  [] Other:    GOALS: assessed 08/30/2022    Patient stated goal: \"flexibility and decrease of pain\"  [] Progressing: [x] Met: [] Not Met: [] Adjusted     Therapist goals for Patient:   Short Term Goals:  To be achieved in: 3 weeks  - Independent in initial HEP per patient tolerance, in order to prevent re-injury. [] Progressing: [x] Met: [] Not Met: [] Adjusted  - Patient will have a decrease in pain 6/10 at worst for improved tolerance to sleeping and position changes. [] Progressing: [x] Met: [] Not Met: [] Adjusted     Long Term Goals: To be achieved in: 6 weeks  - Disability index score of 52 or better on FOTO neck to assist with reaching prior level of function. [x] Progressing: [] Met: [] Not Met: [] Adjusted  - Patient will demonstrate increased AROM of cervical spine 90%, B shoulder AROM WFLs to allow for driving and reaching without increased pain   [x] Progressing: [] Met: [] Not Met: [] Adjusted  - Patient will demonstrate an increase in Strength B shoulders 5/5 to allow for lifting, household chores without increased pain      [x] Progressing: [] Met: [] Not Met: [] Adjusted  -Patient will demonstrate an increase in postural awareness to allow for proper functional mobility as indicated by patients Functional Deficits. [x] Progressing: [] Met: [] Not Met: [] Adjusted  - Patient will have a decrease in pain 3/10 at worst and decreased HA 75% for rare complaints with sleeping and position changes. [x] Progressing: [] Met: [] Not Met: [] Adjusted  - Independent in HEP progression per patient tolerance, in order to prevent re-injury. [x] Progressing: [] Met: [] Not Met: [] Adjusted         ASSESSMENT:  Pt with good tolerance to new exercises added for strengthening. No increased pain reported, just felt tightness. Pt will benefit from additional therapy to address deficits to return to PLOF. Treatment/Activity Tolerance:  [x] Patient tolerated treatment well [] Patient limited by fatique  [] Patient limited by pain  [] Patient limited by other medical complications  [] Other:     Overall Progression Towards Functional goals/ Treatment Progress Update:  [x] Patient is progressing as expected towards functional goals listed.     [] Progression is slowed due to complexities/Impairments listed. [] Progression has been slowed due to co-morbidities. [] Plan just implemented, too soon to assess goals progression <30days   [] Goals require adjustment due to lack of progress  [] Patient is not progressing as expected and requires additional follow up with physician  [] Other    Prognosis for POC: [x] Good [] Fair  [] Poor    Patient requires continued skilled intervention: [x] Yes  [] No        PLAN:   [x] Continue per plan of care [] Alter current plan (see comments)  [] Plan of care initiated [] Hold pending MD visit [] Discharge    Electronically signed by: Bozena Medrano, PT, DPT [id number]    Note: If patient does not return for scheduled/recommended follow up visits, this note will serve as a discharge from care along with the most recent update on progress.

## 2022-09-06 ENCOUNTER — HOSPITAL ENCOUNTER (OUTPATIENT)
Dept: ULTRASOUND IMAGING | Age: 62
Discharge: HOME | End: 2022-09-06
Payer: MEDICAID

## 2022-09-06 DIAGNOSIS — R22.1 NECK MASS: ICD-10-CM

## 2022-09-06 DIAGNOSIS — E04.2 MULTIPLE THYROID NODULES: ICD-10-CM

## 2022-09-06 PROCEDURE — 76536 US EXAM OF HEAD AND NECK: CPT

## 2022-09-07 ENCOUNTER — OFFICE VISIT (OUTPATIENT)
Dept: INTERNAL MEDICINE CLINIC | Age: 62
End: 2022-09-07
Payer: MEDICAID

## 2022-09-07 ENCOUNTER — TELEPHONE (OUTPATIENT)
Dept: ENDOCRINOLOGY | Age: 62
End: 2022-09-07

## 2022-09-07 VITALS
BODY MASS INDEX: 28.12 KG/M2 | WEIGHT: 174.2 LBS | DIASTOLIC BLOOD PRESSURE: 72 MMHG | OXYGEN SATURATION: 98 % | SYSTOLIC BLOOD PRESSURE: 126 MMHG | HEART RATE: 69 BPM

## 2022-09-07 DIAGNOSIS — F51.01 PRIMARY INSOMNIA: ICD-10-CM

## 2022-09-07 DIAGNOSIS — R26.9 ABNORMAL GAIT: ICD-10-CM

## 2022-09-07 DIAGNOSIS — G31.84 MILD COGNITIVE IMPAIRMENT WITH MEMORY LOSS: Primary | ICD-10-CM

## 2022-09-07 DIAGNOSIS — E03.8 OTHER SPECIFIED HYPOTHYROIDISM: ICD-10-CM

## 2022-09-07 DIAGNOSIS — H53.9 CHANGES IN VISION: ICD-10-CM

## 2022-09-07 PROCEDURE — 1036F TOBACCO NON-USER: CPT | Performed by: INTERNAL MEDICINE

## 2022-09-07 PROCEDURE — 90472 IMMUNIZATION ADMIN EACH ADD: CPT | Performed by: INTERNAL MEDICINE

## 2022-09-07 PROCEDURE — 3044F HG A1C LEVEL LT 7.0%: CPT | Performed by: INTERNAL MEDICINE

## 2022-09-07 PROCEDURE — G8427 DOCREV CUR MEDS BY ELIG CLIN: HCPCS | Performed by: INTERNAL MEDICINE

## 2022-09-07 PROCEDURE — G8417 CALC BMI ABV UP PARAM F/U: HCPCS | Performed by: INTERNAL MEDICINE

## 2022-09-07 PROCEDURE — 3017F COLORECTAL CA SCREEN DOC REV: CPT | Performed by: INTERNAL MEDICINE

## 2022-09-07 PROCEDURE — 90677 PCV20 VACCINE IM: CPT | Performed by: INTERNAL MEDICINE

## 2022-09-07 PROCEDURE — 99214 OFFICE O/P EST MOD 30 MIN: CPT | Performed by: INTERNAL MEDICINE

## 2022-09-07 PROCEDURE — 90674 CCIIV4 VAC NO PRSV 0.5 ML IM: CPT | Performed by: INTERNAL MEDICINE

## 2022-09-07 PROCEDURE — 2022F DILAT RTA XM EVC RTNOPTHY: CPT | Performed by: INTERNAL MEDICINE

## 2022-09-07 PROCEDURE — 90471 IMMUNIZATION ADMIN: CPT | Performed by: INTERNAL MEDICINE

## 2022-09-07 RX ORDER — LEVOTHYROXINE SODIUM 0.07 MG/1
75 TABLET ORAL DAILY
Qty: 90 TABLET | Refills: 1 | Status: SHIPPED | OUTPATIENT
Start: 2022-09-07 | End: 2022-09-28

## 2022-09-07 RX ORDER — GABAPENTIN 800 MG/1
TABLET ORAL
Qty: 90 TABLET | Refills: 0
Start: 2022-09-07 | End: 2022-11-04

## 2022-09-07 NOTE — PROGRESS NOTES
Ana Lilia Dewitt (:  1960) is a 60 y.o. female, here for evaluation of the following chief complaint(s):    Follow-up (Vision concerns ( post covid concerns )/Sleeping concerns ( trazodone ) )      ASSESSMENT/PLAN:  1. Mild cognitive impairment with memory loss  Patient has concerns for cognitive impairment. She scored a 26 out of 30 on her MoCA test.  Her recent CTA of head was negative for stroke but did have mild ventricle prominence. With these complaints and her balance complaints, I did refer her to neurology. Saw ENT re: dizziness and they didn't think Meniere's. -     Whitney Irving MD, Neurology, [facility]  2. Other specified hypothyroidism  Due to issues with getting the correct amount of levothyroxine every month, I changed her prescription to 75 mcg daily but skip 1 day/week. She has follow-up with her endocrinologist regarding her recent thyroid ultrasound. 3. Abnormal gait  She will also address with her physical therapist  -     Whitney Irving MD, Neurology, [facility]  4. Changes in vision  She has an appointment this week with ophthalmology  5. Primary insomnia  Her insomnia is well controlled with melatonin, trazodone, gabapentin. She has concerns for polypharmacy so told her to lower her gabapentin to 800 mg before bed. Type 2 diabetes mellitus with complications [facility])  Patient has upcoming visit with endocrinology. Remains on insulin and metformin and now jardiance. History of leukemia  -     Sees Dr. Lori Marion. Will try to get last note. Patient has concerns about her low wbc but has been stable. Neck pain  -     degenerative changes on xray. PT referral ongoing.  -  Chronic ischemic heart disease  -     Saw Dr Tamika Desai who adjusted meds - off plavix and isosorbide, had echo wnl, changed her metoprolol to daily.  On asa/statin  CORONA (nonalcoholic steatohepatitis)  Had u/s of liver with GI  Gastroesophageal reflux disease without esophagitis  - remains on PPI. Had normal EGD per pt       Diabetic polyneuropathy associated with type 2 diabetes mellitus   Stable on gabapentin but will lower dose due to polypharmacy     Other hyperlipidemia  -    stable on atorvastatin     B12 deficiency  -     Vitamin B12; Future  Anxiety and depression   Patient feels her symptoms are better controlled. Sees Dr. Kemi Hanson pending. Remains on duloxetine     Mild persistent asthma without complication  -    stable on albuterol and zyrtec     Restless leg syndrome  May relate to her SNRI. Continue gabapentin. Advised compression stockings for swelling in her legs. Sleep apnea, unspecified type  -     [facility] - cpap advised  Return in about 3 months (around 12/7/2022). SUBJECTIVE/OBJECTIVE:  HPI  Patient is here for routine visit. She is accompanied by her daughter. She had a thyroid ultrasound yesterday and it will require follow-up. We discussed that the nodule is less than 1 cm which is likely why they did not recommend biopsy. Findings had made her a \"nervous wreck. \"    She continues with vision trouble that she has had since having COVID she says. She describes difficulty using her new glasses. She has an appointment with the eye doctor this week. She does have trouble with dry eyes but has not been able to get approval for Restasis. She is seeing the cardiologist due to chest pain but she had a negative stress test.  She states isosorbide did not help her symptoms. It was discontinued. She states albuterol does help. She reports increased feelings of being off balance in recent weeks. She went to the emergency room and had a negative CT scan of her head for acute findings. .    She is working on improving her nutrition to help improve her diabetes control. Patient states she is having trouble with her memory. She states this is why she stopped working. She has difficulty finding certain words.     Review of Systems    Past Medical History:   Diagnosis Date    Anxiety and depression     Asthma     AV block, 1st degree     B12 deficiency     Balance disorder     frequent falls    CAD (coronary artery disease)     Chronic ischemic heart disease     sp stents-was at [facility]-dr davalos? CVA (cerebral vascular accident) ([facility]     2003    Diabetic polyneuropathy associated with type 2 diabetes mellitus ([facility]     [facility]     Essential (primary) hypertension     GERD (gastroesophageal reflux disease)     H/O heart artery stent     X3    H/O total hysterectomy     Hyperparathyroidism ([facility]     Hypothyroidism     Leukemia ([facility]     acute lymphoblastic leukemia- [facility]    Low back pain     Mild intermittent asthma without complication     cold induced    Muscle spasms of neck     CORONA (nonalcoholic steatohepatitis)     saw dr Vesna Lima    CHRIS (obstructive sleep apnea)     Osteoarthritis     hands knees, hips    Osteopenia 2021    dexa    Other hyperlipidemia     PSVT (paroxysmal supraventricular tachycardia) (HCC)     Restless leg syndrome     Sleep apnea     mild    Subarachnoid hemorrhage ([facility] 2003    Type 2 diabetes mellitus with complications ([facility])        Current Outpatient Medications   Medication Sig Dispense Refill    levothyroxine (SYNTHROID) 75 MCG tablet Take 1 tablet by mouth daily Except Sunday 90 tablet 1    Continuous Blood Gluc Sensor (FREESTYLE RAND 14 DAY SENSOR) MISC       gabapentin (NEURONTIN) 800 MG tablet Take 2 tablets by mouth at bedtime for 90 days. 180 tablet 0    insulin glargine (LANTUS SOLOSTAR) 100 UNIT/ML injection pen Inject 28 Units into the skin nightly 15 pen 1    traZODone (DESYREL) 50 MG tablet Take 1 tablet by mouth nightly 30 tablet 2    DULoxetine (CYMBALTA) 30 MG extended release capsule Take 1 capsule by mouth in the morning. Take with the 60mg capsule for a total of 90mg.  90 capsule 1    furosemide (LASIX) 20 MG tablet TAKE ONE TABLET DAILY AS NEEDED FOR EDEMA 30 tablet 3    DULoxetine (CYMBALTA) 60 MG extended release capsule TAKE 1 CAPSULE BY MOUTH  DAILY 90 capsule 1    atorvastatin (LIPITOR) 40 MG tablet TAKE 1 TABLET BY MOUTH  DAILY 90 tablet 1    metFORMIN (GLUCOPHAGE) 500 MG tablet TAKE 2 TABLETS BY MOUTH  TWICE DAILY 360 tablet 1    pantoprazole (PROTONIX) 40 MG tablet TAKE 1 TABLET BY MOUTH  DAILY 90 tablet 1    empagliflozin (JARDIANCE) 25 MG tablet Take 1 tablet by mouth daily 30 tablet 5    Blood Glucose Monitoring Suppl (ONE TOUCH ULTRA 2) w/Device KIT 1 kit by Does not apply route daily 1 kit 0    Lancets MISC 1 each by Does not apply route 2 times daily 100 each 5    blood glucose monitor strips Test 2 times a day & as needed for symptoms of irregular blood glucose 100 strip 5    magnesium oxide (MAG-OX) 400 (240 Mg) MG tablet Take 1 tablet by mouth 2 times daily 60 tablet 3    Insulin Pen Needle 32G X 5 MM MISC 1 each by Other route 5 times daily 100 each 11    metoprolol succinate (TOPROL XL) 25 MG extended release tablet Take 1 tablet by mouth daily 30 tablet 5    Ascorbic Acid (VITAMIN C) 1000 MG tablet Take 500 mg by mouth daily      aspirin 81 MG EC tablet Take 81 mg by mouth daily      Calcium Carbonate-Vitamin D (OYSTER SHELL CALCIUM/D) 500-200 MG-UNIT TABS Take 1 tablet by mouth daily      Cholecalciferol (VITAMIN D3) 125 MCG (5000 UT) CAPS       cyanocobalamin 250 MCG tablet Take 250 mcg by mouth daily      Melatonin 10 MG SUBL       albuterol sulfate  (90 Base) MCG/ACT inhaler Inhale 2 puffs into the lungs every 6 hours as needed for Wheezing 18 g 5    insulin lispro, 1 Unit Dial, (HUMALOG KWIKPEN) 100 UNIT/ML SOPN Use up  To 35 units daily per sliding scale 10 pen 1    cetirizine (ZYRTEC ALLERGY) 10 MG tablet Take 1 tablet by mouth daily       No current facility-administered medications for this visit. Physical Exam  Vitals reviewed. Constitutional:       General: She is not in acute distress. HENT:      Head: Normocephalic and atraumatic.    Neck: Vascular: No carotid bruit. Cardiovascular:      Rate and Rhythm: Normal rate and regular rhythm. Pulmonary:      Effort: Pulmonary effort is normal.      Breath sounds: Normal breath sounds. Musculoskeletal:      Right lower leg: Edema (pedal) present. Left lower leg: Edema (pedal) present. Neurological:      Mental Status: She is alert and oriented to person, place, and time. Gait: Gait abnormal (antalgic). Comments: Reduced balance   Psychiatric:         Mood and Affect: Mood normal.       26/30 - [place]          This note was generated completely or in part utilizing Dragon dictation speech recognition software. Occasionally, words are mistranscribed and despite editing, the text may contain inaccuracies due to incorrect word recognition. If further clarification is needed please contact the office at [phone]          An electronic signature was used to authenticate this note.     --Milo Steven MD

## 2022-09-07 NOTE — TELEPHONE ENCOUNTER
She would like her ultra sound results. She saw them on My Chart and wants to discuss the lesion found on the ultrasound. Her number is  she requests to be called around 4 p.m.

## 2022-09-07 NOTE — TELEPHONE ENCOUNTER
I left patient a message that the nodules are small and would be difficult to get biopsied. Therefore radiologist recommended to repeat ultrasound in 6 months. Please call patient and ask if she has any further questions. If I refer her for biopsy of the 6 mm left thyroid nodule, the biopsy might be technically very difficult and uncomfortable because of the size of the nodule.

## 2022-09-08 ENCOUNTER — OFFICE VISIT (OUTPATIENT)
Dept: CARDIOLOGY CLINIC | Age: 62
End: 2022-09-08
Payer: MEDICAID

## 2022-09-08 VITALS
DIASTOLIC BLOOD PRESSURE: 70 MMHG | WEIGHT: 175.6 LBS | HEART RATE: 78 BPM | BODY MASS INDEX: 28.34 KG/M2 | OXYGEN SATURATION: 97 % | SYSTOLIC BLOOD PRESSURE: 110 MMHG

## 2022-09-08 DIAGNOSIS — I10 PRIMARY HYPERTENSION: ICD-10-CM

## 2022-09-08 DIAGNOSIS — E78.49 OTHER HYPERLIPIDEMIA: ICD-10-CM

## 2022-09-08 DIAGNOSIS — I25.9 CHRONIC ISCHEMIC HEART DISEASE: ICD-10-CM

## 2022-09-08 PROCEDURE — 1036F TOBACCO NON-USER: CPT | Performed by: INTERNAL MEDICINE

## 2022-09-08 PROCEDURE — G8417 CALC BMI ABV UP PARAM F/U: HCPCS | Performed by: INTERNAL MEDICINE

## 2022-09-08 PROCEDURE — 3017F COLORECTAL CA SCREEN DOC REV: CPT | Performed by: INTERNAL MEDICINE

## 2022-09-08 PROCEDURE — 99214 OFFICE O/P EST MOD 30 MIN: CPT | Performed by: INTERNAL MEDICINE

## 2022-09-08 PROCEDURE — G8427 DOCREV CUR MEDS BY ELIG CLIN: HCPCS | Performed by: INTERNAL MEDICINE

## 2022-09-08 RX ORDER — INCLISIRAN 284 MG/1.5ML
284 INJECTION, SOLUTION SUBCUTANEOUS
Qty: 1.5 ML | Refills: 5 | Status: SHIPPED | OUTPATIENT
Start: 2022-09-08

## 2022-09-08 ASSESSMENT — ENCOUNTER SYMPTOMS
EYE DISCHARGE: 0
SHORTNESS OF BREATH: 0
BACK PAIN: 0
BLOOD IN STOOL: 0
ABDOMINAL DISTENTION: 0
WHEEZING: 0
VOMITING: 0
CHEST TIGHTNESS: 0
FACIAL SWELLING: 0
ABDOMINAL PAIN: 0
COUGH: 0
COLOR CHANGE: 0

## 2022-09-08 NOTE — ASSESSMENT & PLAN NOTE
Well-controlled on current medications including metoprolol.   Does have some edema, I did ask her to try Lasix to see if the edema improves, if not may consider lower extremity duplex with reflux

## 2022-09-08 NOTE — PROGRESS NOTES
[facility]     Outpatient Cardiology         Patient Name:  Isidra Swanson  Requesting Physician: No admitting provider for patient encounter. Primary Care Physician: Yandel Sandoval MD    Reason for Consultation/Chief Complaint:   Chief Complaint   Patient presents with    Coronary Artery Disease       History of Present Illness:    HPI     Yonathan Pill a 60 y.o. female with PMH of CAD, S/p SAMARIA x 3 to LAD on 8/7/32 complicated with RP bleed after,  HLD, sleep apnea, DM II, HTN, PSVT, SAH, CORONA. Here for follow up for chest pain. Recently had COVID in August.  Overall feeling better. No longer having episodes of chest pain, had a normal stress test.  CAD, normal stress test, no angina. Continue Lipitor  HLD, on Lipitor 40, no side effects  HTN, controlled on current medications, taking only metoprolol  PSVT, symptomatic PACs  Feeling well in general.  Today we discussed adding Leqvio to her medications given elevated LDL, in her case the goal should be 55 or less given coronary disease. PMH  Past Medical History:   Diagnosis Date    Anxiety and depression     Asthma     AV block, 1st degree     B12 deficiency     Balance disorder     frequent falls    CAD (coronary artery disease)     Chronic ischemic heart disease     sp stents-was at [facility]-dr davalos?     CVA (cerebral vascular accident) [facility])     2003    Diabetic polyneuropathy associated with type 2 diabetes mellitus ([address]     Diabetic retinopathy ([address]     Essential (primary) hypertension     GERD (gastroesophageal reflux disease)     H/O heart artery stent     X3    H/O total hysterectomy     Hyperparathyroidism ([address]     Hypothyroidism     Leukemia ([address]     acute lymphoblastic leukemia- [facility]    Low back pain     Mild intermittent asthma without complication     cold induced    Muscle spasms of neck     CORONA (nonalcoholic steatohepatitis)     saw dr Lia Castillo    CHRIS (obstructive sleep apnea)     Osteoarthritis hands knees, hips    Osteopenia 2021    dexa    Other hyperlipidemia     PSVT (paroxysmal supraventricular tachycardia) ([address]     Restless leg syndrome     Sleep apnea     mild    Subarachnoid hemorrhage ([address] 2003    Thyroid nodule 09/2022    fu 6m    Type 2 diabetes mellitus with complications ([address]        PSH  Past Surgical History:   Procedure Laterality Date    ABDOMINAL EXPLORATION SURGERY      CHOLECYSTECTOMY, LAPAROSCOPIC      COLONOSCOPY N/A 05/27/2022    fu 10 yrs, COLONOSCOPY performed by Vega Castro MD at [address] ([address] [place] [address])  1986    LIVER BIOPSY      OTHER SURGICAL HISTORY Right     radial head fx    MALLORY AND BSO (CERVIX REMOVED)      2 surgeries, 1985, 2001    UPPER GASTROINTESTINAL ENDOSCOPY N/A 05/27/2022    EGD BIOPSY performed by Vega Castro MD at [address] [place]  Social History     Tobacco Use    Smoking status: Never    Smokeless tobacco: Never   Vaping Use    Vaping Use: Never used   Substance Use Topics    Alcohol use: Never    Drug use: Never       Family History  Family History   Problem Relation Age of Onset    Diabetes type 2  Mother     Asthma Mother     Heart Failure Mother     COPD Mother     Diabetes type 2  Father     Heart Disease Father     Lung Cancer Father     Heart Surgery Father     Other Brother         factor 5 leiden    Cancer Maternal Aunt     Thyroid Disease Maternal Aunt     Diabetes type 2  Paternal Aunt     Cancer Paternal Aunt     Diabetes type 2  Paternal Grandmother     Mental Illness Paternal Grandmother         hospitalized with anger issues    Depression Daughter     Anxiety Disorder Daughter     Suicide Nephew         hanged self in 2012       Allergies   Allergies   Allergen Reactions    Iodides Other (See Comments) and Shortness Of Breath     SNEEZING  SNEEZING      Nitrofurantoin Anaphylaxis, Hives, Itching, Shortness Of Breath and Swelling    Nitrofurantoin Macrocrystal Anaphylaxis    Cisapride Shelby Wolff MD   pantoprazole (PROTONIX) 40 MG tablet TAKE 1 TABLET BY MOUTH  DAILY 7/5/22  Yes Estrella Celeste MD   empagliflozin (JARDIANCE) 25 MG tablet Take 1 tablet by mouth daily 7/1/22  Yes Estrella Celeste MD   Blood Glucose Monitoring Suppl (ONE TOUCH ULTRA 2) w/Device KIT 1 kit by Does not apply route daily 6/15/22  Yes Estrella Celeste MD   Lancets MISC 1 each by Does not apply route 2 times daily 6/15/22  Yes Estrella Celeste MD   blood glucose monitor strips Test 2 times a day & as needed for symptoms of irregular blood glucose 6/15/22  Yes Estrella Celeste MD   Insulin Pen Needle 32G X 5 MM MISC 1 each by Other route 5 times daily 5/23/22  Yes Estrella Celeste MD   metoprolol succinate (TOPROL XL) 25 MG extended release tablet Take 1 tablet by mouth daily 5/5/22  Yes Ranjan Peña MD   Ascorbic Acid (VITAMIN C) 1000 MG tablet Take 500 mg by mouth daily   Yes Historical Provider, MD   aspirin 81 MG EC tablet Take 81 mg by mouth daily   Yes Historical Provider, MD   Calcium Carbonate-Vitamin D (OYSTER SHELL CALCIUM/D) 500-200 MG-UNIT TABS Take 1 tablet by mouth daily   Yes Historical Provider, MD   Cholecalciferol (VITAMIN D3) 125 MCG (5000 UT) CAPS    Yes Historical Provider, MD   cyanocobalamin 250 MCG tablet Take 250 mcg by mouth daily   Yes Historical Provider, MD   Melatonin 10 MG SUBL    Yes Historical Provider, MD   albuterol sulfate  (90 Base) MCG/ACT inhaler Inhale 2 puffs into the lungs every 6 hours as needed for Wheezing 4/6/22  Yes Estrella Celeste MD   insulin lispro, 1 Unit Dial, (HUMALOG KWIKPEN) 100 UNIT/ML SOPN Use up  To 35 units daily per sliding scale 4/6/22  Yes Estrella Celeste MD   cetirizine (ZYRTEC ALLERGY) 10 MG tablet Take 1 tablet by mouth daily 4/6/22  Yes Estrella Celeste MD   magnesium oxide (MAG-OX) 400 (240 Mg) MG tablet Take 1 tablet by mouth 2 times daily Head: Normocephalic and atraumatic. Eyes:      Pupils: Pupils are equal, round, and reactive to light. Neck:      Thyroid: No thyromegaly. Vascular: No JVD. Cardiovascular:      Rate and Rhythm: Normal rate and regular rhythm. Chest Wall: PMI is not displaced. Heart sounds: Normal heart sounds, S1 normal and S2 normal. No murmur heard. No friction rub. No gallop. Pulmonary:      Effort: Pulmonary effort is normal. No respiratory distress. Breath sounds: Normal breath sounds. No stridor. No wheezing or rales. Chest:      Chest wall: No tenderness. Abdominal:      General: Bowel sounds are normal. There is no distension. Palpations: Abdomen is soft. Tenderness: There is no abdominal tenderness. There is no guarding or rebound. Musculoskeletal:         General: No tenderness. Normal range of motion. Cervical back: Normal range of motion. Lymphadenopathy:      Cervical: No cervical adenopathy. Skin:     General: Skin is warm and dry. Findings: No erythema or rash. Neurological:      Mental Status: She is alert and oriented to person, place, and time. Coordination: Coordination normal.   Psychiatric:         Behavior: Behavior normal.         Thought Content:  Thought content normal.         Judgment: Judgment normal.       Labs:       Lab Results   Component Value Date    WBC 2.7 (L) 08/30/2022    HGB 12.6 08/30/2022    HCT 38.1 08/30/2022    MCV 89.9 08/30/2022     08/30/2022     Lab Results   Component Value Date     (H) 08/30/2022    K 4.2 08/30/2022     08/30/2022    CO2 26 08/30/2022    BUN 19 08/30/2022    CREATININE 1.0 08/30/2022    GLUCOSE 155 (H) 08/30/2022    CALCIUM 8.9 08/30/2022    PROT 6.4 08/30/2022    LABALBU 4.1 08/30/2022    BILITOT 0.4 08/30/2022    ALKPHOS 74 08/30/2022    AST 24 08/30/2022    ALT 24 08/30/2022    LABGLOM 56 (A) 08/30/2022    GFRAA >60 08/30/2022    AGRATIO 1.8 08/30/2022         Lab Results Component Value Date    CHOL 194 08/30/2022    CHOL 168 04/06/2022     Lab Results   Component Value Date    TRIG 146 08/30/2022    TRIG 158 (H) 04/06/2022     Lab Results   Component Value Date    HDL 58 08/30/2022    HDL 57 04/06/2022     Lab Results   Component Value Date    LDLCALC 107 (H) 08/30/2022    [address] 04/06/2022     Lab Results   Component Value Date    LABVLDL 29 08/30/2022    LABVLDL 32 04/06/2022     No results found for: CHOLHDLRATIO    No results found for: INR, PROTIME    The 10-year ASCVD risk score (Aundra Denver., et al., 2013) is: 8.1%    Values used to calculate the score:      Age: 60 years      Sex: Female      Is Non- : No      Diabetic: Yes      Tobacco smoker: No      Systolic Blood Pressure: 345 mmHg      Is BP treated: Yes      HDL Cholesterol: 58 mg/dL      Total Cholesterol: 194 mg/dL      Imaging:       Last ECG (if available, Personally interpreted):    Last Monitor/Holter (if available):    Last Stress (if available): 8/17/22  Summary    There is normal isotope uptake at stress and rest. There is no evidence of    myocardial ischemia or scar. Normal LV size and systolic function. Left ventricular ejection fraction of 85 %. There are no regional wall motion abnormalities. Overall findings represent a low risk scan. 6/10/21  IMPRESSIONS    Normal perfusion images with no evidence of ischemia    Hyperdynamic LV function     Last Cath (if available): 3/8/19  Result Date: 3/8/2019  · 70% stenosis of the mid LAD and 60% of proximal LAD. FFR was 0.59 with significant stenosis of the proximal segment on pullback so both lesions were stented. · 1 SAMARIA placed in the proximal LAD · 2 SAMARIA placed in the mid to distal LAD with good results. Plan: 1. Ticagrelor daily for a minimum of 12 months 2. Daily aspirin indefinitely 3. Standard secondary preventative medical therapy (statin, beta blocker, ACEI if needed) 4.  Start Imdur for anti-anginal management Cardiac Procedure    Result Date: 3/8/2019  · Proximal LAD 30-40%, mid 30-40%, distal 40-50% · Cx 20-30% · Proximal RCA 20%. · The ejection fraction is greater than 55% by visual estimate. Plan FFR of LAD     Last TTE/KARLA(if available): 22   Summary   Normal left ventricle size, wall thickness, and systolic function with an   estimated ejection fraction of 60%-65%. No regional wall motion abnormalities are seen. Indeterminate diastolic function. Trivial mitral regurgitation. Mildly calcified aortic valve. The pulmonic valve is not well visualized. Last CMR  (if available):    Last Coronary Artery Calcium Score: Ankle-brachial index:    Carotid ultrasound screening:    Abdominal aortic aneurysm screening:       Assessment / Plan:     Other hyperlipidemia  LDL not at goal given history of coronary disease. Start Linette Apgar. Continue Lipitor 40    Chronic ischemic heart disease  Feeling well, no angina. Continue Lipitor. Primary hypertension  Well-controlled on current medications including metoprolol. Does have some edema, I did ask her to try Lasix to see if the edema improves, if not may consider lower extremity duplex with reflux    Chest pain, resolved, normal stress test.    Follow up in 6 months. I had the opportunity to review the clinical symptoms and presentation of Lanre Kuo. Patient's allergies and medications were reviewed and updated. Patient's past medical, surgical, social and family history were reviewed and updated. Patient's testing including laboratory, ECGs, monitor, imaging (TTE,KARLA,CMR,cath) were reviewed. All questions and concerns were addressed to the patient/family. Alternatives to my treatment were discussed. The note was completed using EMR. Every effort wasmade to ensure accuracy; however, inadvertent computerized transcription errors may be present.     Thank you for allowing me to participate in lucero or Lawanda NAVARRETE Bull Prince MD, [facility], [facility] [address]

## 2022-09-09 ENCOUNTER — HOSPITAL ENCOUNTER (OUTPATIENT)
Dept: PHYSICAL THERAPY | Age: 62
Setting detail: THERAPIES SERIES
Discharge: HOME | End: 2022-09-09
Payer: MEDICAID

## 2022-09-09 PROCEDURE — 97110 THERAPEUTIC EXERCISES: CPT

## 2022-09-09 NOTE — FLOWSHEET NOTE
[facility] Outpatient Therapy  [address] [place] [address], [address] [place] [address], [address] [place] [address] Ave  Phone: [phone]   Fax: [phone]    Physical Therapy Treatment Note/ Progress Report:     Date:  2022    Patient Name:  Kam Carcamo    :  1960  MRN: [id number]  Medical/Treatment Diagnosis Information:  Diagnosis: Neck pain (M54.2)  Treatment Diagnosis: Neck pain A50.4  Insurance/Certification information:  PT Insurance Information: [employer] [place] required  Physician Information:    Ted Foote MD    Plan of care signed:    [x] Yes  [] No    Date of Patient follow up with Physician: Sept     Progress Report: []  Yes  [x]  No     Date Range for reporting period:  Beginnin2022  Ending:  NA    Progress report due (10 Rx/or 30 days whichever is less):      Recertification due (POC duration/ or 90 days whichever is less):      Visit # Insurance Allowable Auth Needed    + 6/10 12 visits 2022 - 10/03/2022 ([facility] traction, US, TE, NMR, MT, TA) [x]Yes   []No     RESTRICTIONS/PRECAUTIONS: asthma, chronic ischemic heart disease, CAD - stent placed, CVA, DM, diabetic polyneuropathy, HTN, Hx leukemia, OA, anxiety, depression, AV block, osteoporosis, PSVT, fibromyalgia, dizziness/vertigo - orthostatic hypotension, R radial head fx sx, balance issues, memory problems  Latex Allergy:  [x]NO      []YES  Preferred Language for Healthcare:   [x]English       []other:  Functional Scale: FOTO neck Date assessed:2022    Pain level:  0/10 neck pain at rest, 1/10 with turning and at worst recently    SUBJECTIVE:  Went for a massage Wed and feels like has a blister to top of back but none is present. Hasn't had any HAs related to neck issues, only has HA when puts CPAP on at night. Pt notes she feels is 99.9% improved from IE.          OBJECTIVE:  Visual inspection of upper back and no blistering or bruising noted.   Cervical AROM flexion WFLs without pain, ext 50% stiffness, L rot 75% with stiffness, R rot 75% with stiffness. Strength B shoulder flexion 4/5 no pain, B shoulder abduction 4/5 no pain, B shoulder ER 4+/5 no pain. FOTO neck = 76     Exercises/Interventions: Exercises in bold performed in department today. Items not bolded are carried forward from prior visits for continuity of the record. Exercise/Equipment Resistance/Repetitions Other comments   UT stretch 15\" x 3 each B    Levator stretch 15\" x 3 each B    Posterior capsule stretch 15\" x 3 each B    Chin tuck X 10    scap retraction X 10    Corner pec stretch 15\" x 3     T-band rows/ext Yellow x 10 each    B ER with scap retraction Yellow x 10 Cues for technique   Shoulder flexion t-band Yellow x 10                                                   Home Exercise Program:Pt. demonstrated good understanding and knowledge of HEP. Written instructions provided. 06/21/2022: UT and levator stretches. Access Code [email or web address]  07/14/2022: chin tuck, posterior capsule stretch. Access Code [facility]  07/26/2022: scap retraction, corner pec stretch. Access Code [facility]  08/02/2022: encouraged use of moist heat as helpful  08/16/2022: t-band rows/ext. Access Code [email or web address]  09/01/2022: B ER with scap retraction, shoulder flexion t-band. Access Code [email or web address]    Therapeutic Exercise:   [x] (17148) Provided verbal/tactile cueing for activities related to strengthening, flexibility, endurance, ROM  for improvements in scapular, scapulothoracic and UE control with self care, reaching, carrying, lifting, house/yardwork, driving/computer work. NMR:   [] (47435) Provided verbal/tactile cueing for activities related to improving balance, coordination, kinesthetic sense, posture, motor skill, proprioception  to assist with  scapular, scapulothoracic and UE control with self care, reaching, carrying, lifting, house/yardwork, driving/computer work.     Therapeutic Activities:    [] (33878) Provided verbal/tactile cueing for activities related to improving balance, coordination, kinesthetic sense, posture, motor skill, proprioception and motor activation to allow for proper function of scapular, scapulothoracic and UE control with self care, carrying, lifting, driving/computer work. Home Exercise Program:    [x] (42798) Reviewed/Progressed HEP activities related to strengthening, flexibility, endurance, ROM of scapular, scapulothoracic and UE control with self care, reaching, carrying, lifting, house/yardwork, driving/computer work.   [] (24860) Reviewed/Progressed HEP activities related to improving balance, coordination, kinesthetic sense, posture, motor skill, proprioception of scapular, scapulothoracic and UE control with self care, reaching, carrying, lifting, house/yardwork, driving/computer work. Manual Treatments:  PROM / STM / Oscillations-Mobs:  G-I, II, III, IV (PA's, Inf., Post.)  [] (60215) Provided manual therapy to mobilize soft tissue/joints of cervical/CT, scapular GHJ and UE for the purpose of modulating pain, promoting relaxation,  increasing ROM, reducing/eliminating soft tissue swelling/inflammation/restriction, improving soft tissue extensibility and allowing for proper ROM for normal function with self care, reaching, carrying, lifting, house/yardwork, driving/computer work. STM to B cervical paraspinals and B UT with pt in prone and good tolerance reported. Manual B scapular depression stretch with pt in prone and good stretch noted.   - held per pt request as felt she was doing better    Modalities:    [] Electric Stimulation:   [] Ultrasound:   [] Other:       Charges:  Timed Code Treatment Minutes: TE: 38   Total Treatment Minutes: 38      [] EVAL (LOW) 59428 (typically 20 minutes face-to-face)  [] EVAL (MOD) 59232 (typically 30 minutes face-to-face)  [] EVAL (HIGH) 02622 (typically 45 minutes face-to-face)  [] RE-EVAL     [x] GL(74871) x  3    [] NMR (83666) x       [] Manual (26893) x     [] TA (41307) x         [] ES(attended) (80331)   [] [facility] (82995)  [] Ultrasound (11950)  [] Other:    GOALS: assessed 09/09/2022    Patient stated goal: \"flexibility and decrease of pain\"  [] Progressing: [x] Met: [] Not Met: [] Adjusted     Therapist goals for Patient:   Short Term Goals: To be achieved in: 3 weeks  - Independent in initial HEP per patient tolerance, in order to prevent re-injury. [] Progressing: [x] Met: [] Not Met: [] Adjusted  - Patient will have a decrease in pain 6/10 at worst for improved tolerance to sleeping and position changes. [] Progressing: [x] Met: [] Not Met: [] Adjusted     Long Term Goals: To be achieved in: 6 weeks  - Disability index score of 52 or better on FOTO neck to assist with reaching prior level of function. [] Progressing: [x] Met: [] Not Met: [] Adjusted  - Patient will demonstrate increased AROM of cervical spine 90%, B shoulder AROM WFLs to allow for driving and reaching without increased pain   [x] Progressing: [] Met: [] Not Met: [] Adjusted  - Patient will demonstrate an increase in Strength B shoulders 5/5 to allow for lifting, household chores without increased pain      [x] Progressing: [] Met: [] Not Met: [] Adjusted  -Patient will demonstrate an increase in postural awareness to allow for proper functional mobility as indicated by patients Functional Deficits. [] Progressing: [x] Met: [] Not Met: [] Adjusted  - Patient will have a decrease in pain 3/10 at worst and decreased HA 75% for rare complaints with sleeping and position changes. [] Progressing: [x] Met: [] Not Met: [] Adjusted  - Independent in HEP progression per patient tolerance, in order to prevent re-injury. [] Progressing: [x] Met: [] Not Met: [] Adjusted         ASSESSMENT:  Pt has made great progress towards goals and feels is ready for independent HEP only at this time. Recommend pt continue with HEP as instructed and F/U with Dr as needed.   Will hold chart thru end of insurance auth date. Treatment/Activity Tolerance:  [x] Patient tolerated treatment well [] Patient limited by fatique  [] Patient limited by pain  [] Patient limited by other medical complications  [] Other:     Overall Progression Towards Functional goals/ Treatment Progress Update:  [x] Patient is progressing as expected towards functional goals listed. [] Progression is slowed due to complexities/Impairments listed. [] Progression has been slowed due to co-morbidities. [] Plan just implemented, too soon to assess goals progression <30days   [] Goals require adjustment due to lack of progress  [] Patient is not progressing as expected and requires additional follow up with physician  [] Other    Prognosis for POC: [x] Good [] Fair  [] Poor    Patient requires continued skilled intervention: [x] Yes  [] No        PLAN:   [] Continue per plan of care [] Alter current plan (see comments)  [] Plan of care initiated [x] Hold until end of insurance auth date [] Discharge    Electronically signed by: Erin Santana PT, DPT [id number]    Note: If patient does not return for scheduled/recommended follow up visits, this note will serve as a discharge from care along with the most recent update on progress.

## 2022-09-12 ENCOUNTER — TELEPHONE (OUTPATIENT)
Dept: ENDOCRINOLOGY | Age: 62
End: 2022-09-12

## 2022-09-12 ENCOUNTER — APPOINTMENT (OUTPATIENT)
Dept: PHYSICAL THERAPY | Age: 62
End: 2022-09-12
Payer: MEDICAID

## 2022-09-12 DIAGNOSIS — E53.8 B12 DEFICIENCY: ICD-10-CM

## 2022-09-12 RX ORDER — FLASH GLUCOSE SENSOR
KIT MISCELLANEOUS
Qty: 2 EACH | Refills: 3 | Status: SHIPPED | OUTPATIENT
Start: 2022-09-12

## 2022-09-12 RX ORDER — INSULIN GLARGINE 100 [IU]/ML
30 INJECTION, SOLUTION SUBCUTANEOUS NIGHTLY
Qty: 15 ADJUSTABLE DOSE PRE-FILLED PEN SYRINGE | Refills: 1
Start: 2022-09-12 | End: 2022-09-28

## 2022-09-12 NOTE — TELEPHONE ENCOUNTER
Pt states she received a sample of the Freestyle 2 sensors from office and she is about to run out. She didn't know what to do next? Does she bring in to office to download her readings? Will her insulin need adjusted?  She will need refill for Freestyle 2 sensors sent to [facility] on [address]    CB#  [phone]

## 2022-09-13 DIAGNOSIS — E78.49 OTHER HYPERLIPIDEMIA: ICD-10-CM

## 2022-09-13 DIAGNOSIS — E11.42 DIABETIC POLYNEUROPATHY ASSOCIATED WITH TYPE 2 DIABETES MELLITUS (HCC): Primary | ICD-10-CM

## 2022-09-13 DIAGNOSIS — K21.9 GASTROESOPHAGEAL REFLUX DISEASE WITHOUT ESOPHAGITIS: ICD-10-CM

## 2022-09-13 DIAGNOSIS — J45.30 MILD PERSISTENT ASTHMA WITHOUT COMPLICATION: ICD-10-CM

## 2022-09-13 DIAGNOSIS — F33.1 MODERATE EPISODE OF RECURRENT MAJOR DEPRESSIVE DISORDER (HCC): ICD-10-CM

## 2022-09-13 RX ORDER — GABAPENTIN 800 MG/1
TABLET ORAL
Qty: 90 TABLET | Refills: 0 | Status: CANCELLED | OUTPATIENT
Start: 2022-09-13 | End: 2022-12-12

## 2022-09-13 RX ORDER — METOPROLOL SUCCINATE 25 MG/1
25 TABLET, EXTENDED RELEASE ORAL DAILY
Qty: 90 TABLET | Refills: 3 | Status: SHIPPED | OUTPATIENT
Start: 2022-09-13 | End: 2022-11-04 | Stop reason: SDUPTHER

## 2022-09-13 RX ORDER — PANTOPRAZOLE SODIUM 40 MG/1
40 TABLET, DELAYED RELEASE ORAL DAILY
Qty: 90 TABLET | Refills: 1 | Status: CANCELLED | OUTPATIENT
Start: 2022-09-13

## 2022-09-13 RX ORDER — ALBUTEROL SULFATE 90 UG/1
2 AEROSOL, METERED RESPIRATORY (INHALATION) EVERY 6 HOURS PRN
Qty: 18 G | Refills: 5 | Status: CANCELLED | OUTPATIENT
Start: 2022-09-13

## 2022-09-13 RX ORDER — TRAZODONE HYDROCHLORIDE 50 MG/1
50 TABLET ORAL NIGHTLY
Qty: 30 TABLET | Refills: 2 | Status: SHIPPED | OUTPATIENT
Start: 2022-09-13 | End: 2022-10-10 | Stop reason: SDUPTHER

## 2022-09-13 RX ORDER — ATORVASTATIN CALCIUM 40 MG/1
40 TABLET, FILM COATED ORAL DAILY
Qty: 90 TABLET | Refills: 1 | Status: CANCELLED | OUTPATIENT
Start: 2022-09-13

## 2022-09-13 NOTE — TELEPHONE ENCOUNTER
Requested Prescriptions     Pending Prescriptions Disp Refills    metoprolol succinate (TOPROL XL) 25 MG extended release tablet 90 tablet 3     Sig: Take 1 tablet by mouth daily              Last Office Visit: 9/8/2022     Next Office Visit: 3/9/2023

## 2022-09-13 NOTE — TELEPHONE ENCOUNTER
Prescriptions for sensors were sent to pharmacy. I will review download when she comes for appointment. Continue insulin adjustments based on blood glucose levels.

## 2022-09-15 ENCOUNTER — TELEPHONE (OUTPATIENT)
Dept: CARDIOLOGY CLINIC | Age: 62
End: 2022-09-15

## 2022-09-15 DIAGNOSIS — E78.49 OTHER HYPERLIPIDEMIA: ICD-10-CM

## 2022-09-15 RX ORDER — ATORVASTATIN CALCIUM 40 MG/1
40 TABLET, FILM COATED ORAL DAILY
Qty: 90 TABLET | Refills: 1 | Status: SHIPPED | OUTPATIENT
Start: 2022-09-15

## 2022-09-15 NOTE — TELEPHONE ENCOUNTER
Patient called requesting to speak with  Nurse regarding medication Leqvio patient contacted the pharmacy to check the status of the prior authorization and was informed there was a request for a peer to peer with Dr Maksim Prado sent to our office  patient hasn't started medication please call pt with additional questions

## 2022-09-19 NOTE — TELEPHONE ENCOUNTER
Spoke with patient, will be going threw [facility] in [facility] for [address]. They will call her to set up an appointment.

## 2022-09-27 ENCOUNTER — OFFICE VISIT (OUTPATIENT)
Dept: ENDOCRINOLOGY | Age: 62
End: 2022-09-27
Payer: MEDICAID

## 2022-09-27 PROCEDURE — G8427 DOCREV CUR MEDS BY ELIG CLIN: HCPCS | Performed by: DIETITIAN, REGISTERED

## 2022-09-27 PROCEDURE — 97803 MED NUTRITION INDIV SUBSEQ: CPT | Performed by: DIETITIAN, REGISTERED

## 2022-09-27 PROCEDURE — 3044F HG A1C LEVEL LT 7.0%: CPT | Performed by: DIETITIAN, REGISTERED

## 2022-09-27 PROCEDURE — G8417 CALC BMI ABV UP PARAM F/U: HCPCS | Performed by: DIETITIAN, REGISTERED

## 2022-09-27 NOTE — PROGRESS NOTES
Medical Nutrition Therapy for Diabetes    Pola Aranda  September 27, 2022      Patient Care Team:  Nubia Hayes MD as PCP - General (Internal Medicine)  Nubia Hayes MD as PCP - [facility] Empaneled Provider  Corina Arce MD as Consulting Physician (Gastroenterology)  Beto Celaya MD as Consulting Physician (Psychiatry)    Reason for visit: uncontrolled, Type 2 Diabetes    ASSESSMENT/PLAN:   NUTRITION DIAGNOSIS  Follow up    #1 Problem: Altered Nutrition-Related Laboratory Values (NC-2.2)  Related to: Endocrine/Diabetes   As Evidenced by: Elevated Plasma glucose and/or HgbA1c levels          #2 Problem: Excessive Carbohydrate Intake (NI-5.8. 2)  Related to: Food-and nutrition-related knowledge deficit concerning appropriate amount of carbohydrate intake  As evidenced by: Estimated carbohydrate intake that is consistently more than the recommended amounts      #3 Problem: Knowledge and Beliefs-3.1                     Food and nutrition deficits    NUTRITION INTERVENTION  Nutrition Prescription: 30 grams carbohydrate per meal with protein and non-starch vegetables  15 gram carbohydrate snacks     Diabetes Education/Counseling included:  Congratulated on implementing 3 meals per day and beginning carbohydrate counting. Discussed benefits of taking mealtime insulin with a meal after treated  hypoglycemia event and the glucose has returned to normal level. Interventions:  Encouraged continuing good eating practices; 3 meals with 30 gram carbohydrate.     Suggested including protein each meal.    NUTRITION MONITORING AND EVALUATION  3 meals  30 gram carbohydrate meals   Carry glucose tabs       Patient Active Problem List   Diagnosis    Chronic ischemic heart disease    Diabetic polyneuropathy associated with type 2 diabetes mellitus (HCC)    GERD (gastroesophageal reflux disease)    Leukemia (HCC)    Hypothyroidism    CORONA (nonalcoholic steatohepatitis)    Other hyperlipidemia    Type 2 diabetes mellitus with complications (HCC)    Restless leg syndrome    Mild intermittent asthma without complication    Balance disorder    CHRIS (obstructive sleep apnea)    Coronary artery disease involving native coronary artery of native heart without angina pectoris    Primary hypertension    Palpitations    Moderate episode of recurrent major depressive disorder (HCC)    KARIE (generalized anxiety disorder)    Type 2 diabetes mellitus, uncontrolled, with neuropathy ([facility] [address]    Overweight (BMI 25.0-29. 9)    Multiple thyroid nodules    Neck mass       Current Outpatient Medications   Medication Sig Dispense Refill    atorvastatin (LIPITOR) 40 MG tablet Take 1 tablet by mouth daily 90 tablet 1    metoprolol succinate (TOPROL XL) 25 MG extended release tablet Take 1 tablet by mouth daily 90 tablet 3    traZODone (DESYREL) 50 MG tablet Take 1 tablet by mouth nightly 30 tablet 2    Continuous Blood Gluc Sensor (FREESTYLE RAND 2 SENSOR) MISC Change sensor q 14 days 2 each 3    insulin glargine (LANTUS SOLOSTAR) 100 UNIT/ML injection pen Inject 30 Units into the skin nightly 15 Adjustable Dose Pre-filled Pen Syringe 1    Inclisiran Sodium (LEQVIO) 284 MG/1.5ML Inject 1.5 mLs into the skin every 6 months 1.5 mL 5    levothyroxine (SYNTHROID) 75 MCG tablet Take 1 tablet by mouth daily Except Sunday 90 tablet 1    gabapentin (NEURONTIN) 800 MG tablet Take one tablet at bedtime. 90 tablet 0    Continuous Blood Gluc Sensor (FREESTYLE RAND 14 DAY SENSOR) MISC       DULoxetine (CYMBALTA) 30 MG extended release capsule Take 1 capsule by mouth in the morning. Take with the 60mg capsule for a total of 90mg.  90 capsule 1    furosemide (LASIX) 20 MG tablet TAKE ONE TABLET DAILY AS NEEDED FOR EDEMA 30 tablet 3    DULoxetine (CYMBALTA) 60 MG extended release capsule TAKE 1 CAPSULE BY MOUTH  DAILY 90 capsule 1    metFORMIN (GLUCOPHAGE) 500 MG tablet TAKE 2 TABLETS BY MOUTH  TWICE DAILY 360 tablet 1    pantoprazole (PROTONIX) 40 MG tablet TAKE 1 TABLET BY MOUTH  DAILY 90 tablet 1    empagliflozin (JARDIANCE) 25 MG tablet Take 1 tablet by mouth daily 30 tablet 5    Blood Glucose Monitoring Suppl (ONE TOUCH ULTRA 2) w/Device KIT 1 kit by Does not apply route daily 1 kit 0    Lancets MISC 1 each by Does not apply route 2 times daily 100 each 5    blood glucose monitor strips Test 2 times a day & as needed for symptoms of irregular blood glucose 100 strip 5    magnesium oxide (MAG-OX) 400 (240 Mg) MG tablet Take 1 tablet by mouth 2 times daily 60 tablet 3    Insulin Pen Needle 32G X 5 MM MISC 1 each by Other route 5 times daily 100 each 11    Ascorbic Acid (VITAMIN C) 1000 MG tablet Take 500 mg by mouth daily      aspirin 81 MG EC tablet Take 81 mg by mouth daily      Calcium Carbonate-Vitamin D (OYSTER SHELL CALCIUM/D) 500-200 MG-UNIT TABS Take 1 tablet by mouth daily      Cholecalciferol (VITAMIN D3) 125 MCG (5000 UT) CAPS       cyanocobalamin 250 MCG tablet Take 250 mcg by mouth daily      Melatonin 10 MG SUBL       albuterol sulfate  (90 Base) MCG/ACT inhaler Inhale 2 puffs into the lungs every 6 hours as needed for Wheezing 18 g 5    insulin lispro, 1 Unit Dial, (HUMALOG KWIKPEN) 100 UNIT/ML SOPN Use up  To 35 units daily per sliding scale 10 pen 1    cetirizine (ZYRTEC ALLERGY) 10 MG tablet Take 1 tablet by mouth daily       No current facility-administered medications for this visit.          NUTRITION ASSESSMENT    Biochemical Data:    Lab Results   Component Value Date    LABA1C 6.5 08/30/2022     Lab Results   Component Value Date    .9 08/30/2022       Lab Results   Component Value Date    CHOL 194 08/30/2022    CHOL 168 04/06/2022     Lab Results   Component Value Date    TRIG 146 08/30/2022    TRIG 158 (H) 04/06/2022     Lab Results   Component Value Date    HDL 58 08/30/2022    HDL 57 04/06/2022     Lab Results   Component Value Date    LDLCALC 107 (H) 08/30/2022    LDLCALC 79 04/06/2022     Lab Results   Component Value Date LABVLDL 29 08/30/2022    LABVLDL 32 04/06/2022     No results found for: [facility]    Lab Results   Component Value Date    WBC 2.7 (L) 08/30/2022    HGB 12.6 08/30/2022    HCT 38.1 08/30/2022    MCV 89.9 08/30/2022     08/30/2022       Lab Results   Component Value Date    CREATININE 1.0 08/30/2022    BUN 19 08/30/2022     (H) 08/30/2022    K 4.2 08/30/2022     08/30/2022    CO2 26 08/30/2022       Diabetes Medications: Yes  Knows name and dose of prescribed medications Yes  Knows prescribed schedule for medicationsYes  Recent change in medication type/dosage: Yes  Stores  medications properlyYes  Comments:     Monitoring:   Has BG meter: Yes  Testing frequency: multiple/day  Recent results: , 244  Hypoglycemia? Yes    Anthropometric Measurements: Wt:   Wt Readings from Last 3 Encounters:   09/08/22 175 lb 9.6 oz (79.7 kg)   09/07/22 174 lb 3.2 oz (79 kg)   08/29/22 175 lb (79.4 kg)      BMI:   BMI Readings from Last 3 Encounters:   09/08/22 28.34 kg/m²   09/07/22 28.12 kg/m²   08/29/22 28.25 kg/m²     Patient's stated goal weight:   7% Weight loss goal weight:     Physical Activity History:   Physical activity: walking, yard work  Frequency of activity: activities of living  Duration of activity: variable  Obstacles to activity: none    Sleep: good with C-Pap    Food and Nutrition History:   Nutrition Awareness/Previous DSMES: yes   Number of people in household: 1  Frequency of Meals Eaten away from home:2-3/week  Food Availability Problems  Within the past 12 months, have you worried that your food would run out before you got money to buy more? No  Within the past 12 months, has the food you bought not lasted till the end of the month and you didn't have money to get more?  No  Beverage consumption: water - 80 ounces, diet pop periodically,   Alcohol consumption: No  Usual Food consumption:   3 meals, limiting carbohydrate and including protein      Barriers:   -none Follow Up Plan: 3 months Will remain available. Contact information given.      Referring Provider: Ron Urban MD  Time spent with patient: 45 minutes

## 2022-09-28 ENCOUNTER — OFFICE VISIT (OUTPATIENT)
Dept: ENDOCRINOLOGY | Age: 62
End: 2022-09-28
Payer: MEDICAID

## 2022-09-28 VITALS
BODY MASS INDEX: 27.8 KG/M2 | HEIGHT: 66 IN | DIASTOLIC BLOOD PRESSURE: 70 MMHG | TEMPERATURE: 98 F | RESPIRATION RATE: 14 BRPM | HEART RATE: 74 BPM | OXYGEN SATURATION: 98 % | SYSTOLIC BLOOD PRESSURE: 114 MMHG | WEIGHT: 173 LBS

## 2022-09-28 DIAGNOSIS — K75.81 NASH (NONALCOHOLIC STEATOHEPATITIS): ICD-10-CM

## 2022-09-28 DIAGNOSIS — G47.33 OSA (OBSTRUCTIVE SLEEP APNEA): ICD-10-CM

## 2022-09-28 DIAGNOSIS — R22.1 NECK MASS: ICD-10-CM

## 2022-09-28 DIAGNOSIS — E03.9 ACQUIRED HYPOTHYROIDISM: ICD-10-CM

## 2022-09-28 DIAGNOSIS — E78.49 OTHER HYPERLIPIDEMIA: ICD-10-CM

## 2022-09-28 DIAGNOSIS — I10 PRIMARY HYPERTENSION: ICD-10-CM

## 2022-09-28 DIAGNOSIS — E04.2 MULTIPLE THYROID NODULES: ICD-10-CM

## 2022-09-28 DIAGNOSIS — E11.40 TYPE 2 DIABETES, CONTROLLED, WITH NEUROPATHY (HCC): Primary | ICD-10-CM

## 2022-09-28 DIAGNOSIS — E66.3 OVERWEIGHT (BMI 25.0-29.9): ICD-10-CM

## 2022-09-28 DIAGNOSIS — I25.10 CORONARY ARTERY DISEASE INVOLVING NATIVE CORONARY ARTERY OF NATIVE HEART WITHOUT ANGINA PECTORIS: ICD-10-CM

## 2022-09-28 PROCEDURE — G8427 DOCREV CUR MEDS BY ELIG CLIN: HCPCS | Performed by: INTERNAL MEDICINE

## 2022-09-28 PROCEDURE — 99214 OFFICE O/P EST MOD 30 MIN: CPT | Performed by: INTERNAL MEDICINE

## 2022-09-28 PROCEDURE — 2022F DILAT RTA XM EVC RTNOPTHY: CPT | Performed by: INTERNAL MEDICINE

## 2022-09-28 PROCEDURE — 1036F TOBACCO NON-USER: CPT | Performed by: INTERNAL MEDICINE

## 2022-09-28 PROCEDURE — 3017F COLORECTAL CA SCREEN DOC REV: CPT | Performed by: INTERNAL MEDICINE

## 2022-09-28 PROCEDURE — 3044F HG A1C LEVEL LT 7.0%: CPT | Performed by: INTERNAL MEDICINE

## 2022-09-28 PROCEDURE — 95251 CONT GLUC MNTR ANALYSIS I&R: CPT | Performed by: INTERNAL MEDICINE

## 2022-09-28 PROCEDURE — G8417 CALC BMI ABV UP PARAM F/U: HCPCS | Performed by: INTERNAL MEDICINE

## 2022-09-28 RX ORDER — LEVOTHYROXINE SODIUM 0.07 MG/1
75 TABLET ORAL
Qty: 90 TABLET | Refills: 1 | Status: SHIPPED | OUTPATIENT
Start: 2022-09-28

## 2022-09-28 RX ORDER — INSULIN GLARGINE 100 [IU]/ML
29 INJECTION, SOLUTION SUBCUTANEOUS NIGHTLY
Qty: 5 ADJUSTABLE DOSE PRE-FILLED PEN SYRINGE | Refills: 0
Start: 2022-09-28

## 2022-09-28 RX ORDER — METHOCARBAMOL 500 MG/1
TABLET, FILM COATED ORAL
COMMUNITY
Start: 2022-09-11
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
## 2022-10-04 ENCOUNTER — TELEPHONE (OUTPATIENT)
Dept: ENDOCRINOLOGY | Age: 62
End: 2022-10-04

## 2022-10-05 ENCOUNTER — TELEPHONE (OUTPATIENT)
Dept: INTERNAL MEDICINE CLINIC | Age: 62
End: 2022-10-05

## 2022-10-05 NOTE — TELEPHONE ENCOUNTER
Pt can not be seen due to the referral first being written to [facility]     Their policy now is that they will not see patients whose referral have been rejected from [address] [place] [address] [id number]

## 2022-10-05 NOTE — TELEPHONE ENCOUNTER
Contacted patient's pharmacy benefits and spoke with rep who stated no PA is required with reference # [id number]

## 2022-10-05 NOTE — TELEPHONE ENCOUNTER
Patient states she called to be scheduled at Dr. Ana Paula Guthrie office and patient states they told her she cannot be scheduled at their office since she was initially referred to a [facility] neurologist. Patient informed them she was not seen at the [facility] office due to them not accepting her insurance but Dr. Ana Paula Guthrie office said they would not schedule her. Patient is wondering if it be possible for Dr. Alejo Sales or MA to call and find out why patient is not able to be scheduled since she was never seen at [facility]? Or if the first referral can be deleted so that she can be scheduled at Dr. Ana Paula Guthrie. Patient is also willing to take other recommendations for neurology that accept medicaid. Please contact patient at number provided to further discuss.

## 2022-10-06 NOTE — TELEPHONE ENCOUNTER
Spoke with office and was advised that we cannot get this patient scheduled due to initial referral not be made to him.  Sent e-mail to our network utilization manager, to discuss

## 2022-10-10 ENCOUNTER — OFFICE VISIT (OUTPATIENT)
Dept: PSYCHIATRY | Age: 62
End: 2022-10-10
Payer: MEDICAID

## 2022-10-10 VITALS — SYSTOLIC BLOOD PRESSURE: 118 MMHG | WEIGHT: 172.2 LBS | DIASTOLIC BLOOD PRESSURE: 62 MMHG | BODY MASS INDEX: 27.79 KG/M2

## 2022-10-10 DIAGNOSIS — F33.1 MODERATE EPISODE OF RECURRENT MAJOR DEPRESSIVE DISORDER (HCC): Primary | ICD-10-CM

## 2022-10-10 DIAGNOSIS — F41.1 GAD (GENERALIZED ANXIETY DISORDER): ICD-10-CM

## 2022-10-10 PROCEDURE — 1036F TOBACCO NON-USER: CPT | Performed by: STUDENT IN AN ORGANIZED HEALTH CARE EDUCATION/TRAINING PROGRAM

## 2022-10-10 PROCEDURE — G8482 FLU IMMUNIZE ORDER/ADMIN: HCPCS | Performed by: STUDENT IN AN ORGANIZED HEALTH CARE EDUCATION/TRAINING PROGRAM

## 2022-10-10 PROCEDURE — 3017F COLORECTAL CA SCREEN DOC REV: CPT | Performed by: STUDENT IN AN ORGANIZED HEALTH CARE EDUCATION/TRAINING PROGRAM

## 2022-10-10 PROCEDURE — G8427 DOCREV CUR MEDS BY ELIG CLIN: HCPCS | Performed by: STUDENT IN AN ORGANIZED HEALTH CARE EDUCATION/TRAINING PROGRAM

## 2022-10-10 PROCEDURE — G8417 CALC BMI ABV UP PARAM F/U: HCPCS | Performed by: STUDENT IN AN ORGANIZED HEALTH CARE EDUCATION/TRAINING PROGRAM

## 2022-10-10 PROCEDURE — 99214 OFFICE O/P EST MOD 30 MIN: CPT | Performed by: STUDENT IN AN ORGANIZED HEALTH CARE EDUCATION/TRAINING PROGRAM

## 2022-10-10 RX ORDER — DULOXETIN HYDROCHLORIDE 30 MG/1
30 CAPSULE, DELAYED RELEASE ORAL DAILY
Qty: 90 CAPSULE | Refills: 1 | Status: SHIPPED | OUTPATIENT
Start: 2022-10-10

## 2022-10-10 RX ORDER — TRAZODONE HYDROCHLORIDE 50 MG/1
50 TABLET ORAL NIGHTLY
Qty: 30 TABLET | Refills: 2 | Status: SHIPPED | OUTPATIENT
Start: 2022-10-10 | End: 2022-11-04 | Stop reason: SDUPTHER

## 2022-10-10 ASSESSMENT — PATIENT HEALTH QUESTIONNAIRE - PHQ9
5. POOR APPETITE OR OVEREATING: 1
3. TROUBLE FALLING OR STAYING ASLEEP: 1
SUM OF ALL RESPONSES TO PHQ QUESTIONS 1-9: 10
6. FEELING BAD ABOUT YOURSELF - OR THAT YOU ARE A FAILURE OR HAVE LET YOURSELF OR YOUR FAMILY DOWN: 1
7. TROUBLE CONCENTRATING ON THINGS, SUCH AS READING THE NEWSPAPER OR WATCHING TELEVISION: 1
9. THOUGHTS THAT YOU WOULD BE BETTER OFF DEAD, OR OF HURTING YOURSELF: 1
SUM OF ALL RESPONSES TO PHQ QUESTIONS 1-9: 10
8. MOVING OR SPEAKING SO SLOWLY THAT OTHER PEOPLE COULD HAVE NOTICED. OR THE OPPOSITE, BEING SO FIGETY OR RESTLESS THAT YOU HAVE BEEN MOVING AROUND A LOT MORE THAN USUAL: 1
1. LITTLE INTEREST OR PLEASURE IN DOING THINGS: 1
SUM OF ALL RESPONSES TO PHQ QUESTIONS 1-9: 10
4. FEELING TIRED OR HAVING LITTLE ENERGY: 1
SUM OF ALL RESPONSES TO PHQ9 QUESTIONS 1 & 2: 3
SUM OF ALL RESPONSES TO PHQ QUESTIONS 1-9: 9
2. FEELING DOWN, DEPRESSED OR HOPELESS: 2
10. IF YOU CHECKED OFF ANY PROBLEMS, HOW DIFFICULT HAVE THESE PROBLEMS MADE IT FOR YOU TO DO YOUR WORK, TAKE CARE OF THINGS AT HOME, OR GET ALONG WITH OTHER PEOPLE: 1

## 2022-10-10 ASSESSMENT — ANXIETY QUESTIONNAIRES
5. BEING SO RESTLESS THAT IT IS HARD TO SIT STILL: 1
2. NOT BEING ABLE TO STOP OR CONTROL WORRYING: 1
6. BECOMING EASILY ANNOYED OR IRRITABLE: 1
7. FEELING AFRAID AS IF SOMETHING AWFUL MIGHT HAPPEN: 1
3. WORRYING TOO MUCH ABOUT DIFFERENT THINGS: 1
1. FEELING NERVOUS, ANXIOUS, OR ON EDGE: 1
GAD7 TOTAL SCORE: 7
IF YOU CHECKED OFF ANY PROBLEMS ON THIS QUESTIONNAIRE, HOW DIFFICULT HAVE THESE PROBLEMS MADE IT FOR YOU TO DO YOUR WORK, TAKE CARE OF THINGS AT HOME, OR GET ALONG WITH OTHER PEOPLE: SOMEWHAT DIFFICULT
4. TROUBLE RELAXING: 1

## 2022-10-10 NOTE — PROGRESS NOTES
PSYCHIATRY PROGRESS NOTE    Beny Dai  1960  10/10/22  Face to Face time: 30 minutes  PCP: Bonni Fabry, MD    CC:   Chief Complaint   Patient presents with    Follow-up       Patient is a 59-year-old female with a past medical history significant for leukemia at age 11, subarachnoid hemorrhage, CHRIS, hypertension, RLS, diabetes with polyneuropathy, coronary artery disease status post stenting, GERD, hypothyroidism, and arthritis who presents to the outpatient psychiatric clinic today for evaluation management of depression and anxiety. A:  Patient's presentation today is indicative of relative stability of her underlying depression and anxiety disorders with the current medication regimen. Patient has maintained this stability over the course of the last 8 weeks, with subsequent improvements in her depression and anxiety expected after she starts work in psychotherapy. We will continue to monitor the patient over the course of the next several months for potential changes, though if she remains stable or improves further with the therapy she may be able to transition back over to PCP management. Diagnosis:  Major depressive disorder, recurrent moderate  Generalized anxiety disorder  Adjustment disorder with mixed depressed and anxious features, resolved    P:   1. We will plan to continue the patient on her current medication regimen of duloxetine 90 mg daily and trazodone 50 mg daily. Medication Monitoring:    - PDMP reviewed: Current prescription for gabapentin    Follow-up: 3 months    Safety: Pt was counseled on the potential for increased suicidal ideations and advised on potential options for dealing with these including hotlines, calling the office, or going to the nearest emergency room. __________________________________________________________________________    S:   Patient indicated that she was doing quite well.   She is having some physical issues at the moment including feeling that there is something wrong with her thyroid. She is doing a little bit extra sleeping but overall finding that she is getting restorative sleep which has been a large boon to her. She is still having some neuropathy issues, however she is finding that it is better on the reduced dose of gabapentin that she is on. She actually ended up having fallen on several occasions over the course the last several weeks, finding that this is something she had not been able to do previously often secondary to pain or other physical issues. Overall, the patient recognizes that she is significantly improved from previous but can be bettered as far as her own mindset about her debilities, though she recognizes the need for therapy for this. Patient denied any SI/HI/AVH on evaluation today. ROS:  Review of Systems   Constitutional: Negative. HENT: Negative. Eyes: Negative. Respiratory: Negative. Cardiovascular: Negative. Gastrointestinal: Negative. Endocrine: Negative. Genitourinary: Negative. Musculoskeletal: Negative. Skin: Negative. Allergic/Immunologic: Negative. Neurological: Negative. Hematological: Negative. Psychiatric/Behavioral: Negative.         Brief Medical Hx:   Patient Active Problem List   Diagnosis    Chronic ischemic heart disease    Diabetic polyneuropathy associated with type 2 diabetes mellitus (HCC)    GERD (gastroesophageal reflux disease)    Leukemia (HCC)    Hypothyroidism    CORONA (nonalcoholic steatohepatitis)    Other hyperlipidemia    Type 2 diabetes mellitus with complications (HCC)    Restless leg syndrome    Mild intermittent asthma without complication    Balance disorder    CHRIS (obstructive sleep apnea)    Coronary artery disease involving native coronary artery of native heart without angina pectoris    Primary hypertension    Palpitations    Moderate episode of recurrent major depressive disorder (HCC)    KARIE (generalized anxiety disorder)    Type 2 diabetes mellitus, uncontrolled, with neuropathy    Overweight (BMI 25.0-29. 9)    Multiple thyroid nodules    Neck mass        Brief Psych Hx:  Hosp: Denied  Diagnoses: Depression and anxiety  Med trials: Amitriptyline for years, nortriptyline  Outpt: Previously seen multiple therapists but no psychiatrist  NSSI: Denied  Suicide Attempts: Denied    O:  Wt Readings from Last 3 Encounters:   10/12/22 173 lb (78.5 kg)   10/10/22 172 lb 3.2 oz (78.1 kg)   09/28/22 173 lb (78.5 kg)       Vitals:    10/10/22 1624   BP: 118/62   Weight: 172 lb 3.2 oz (78.1 kg)       Mental Status Exam:   Appearance:    Appropriately dressed  Motor: No abnormal movements, tics or mannerisms. Eye Contact: Good  Speech:    Appropriate rate and rhythm  Language:   Appropriate diction  Mood/Affect:  \" I am doing well\"/euthymic  Thought Process:   Linear, logical  Thought Content:    Topic-appropriate, no SI/HI  Hallucinations:   Denied, not seem to be responding to internal stimuli  Associations:   Intact  Attention/Concentration:   Intact  Orientation:    Alert and oriented x4  Memory:   Intact  Fund of Knowledge:    Appropriate for age and education  Insight/Judgement:   Intact/intact      KARIE-7 SCREENING 10/11/2022 10/10/2022   Feeling nervous, anxious, or on edge Several days Several days   Not being able to stop or control worrying Several days Several days   Worrying too much about different things Several days Several days   Trouble relaxing Several days Several days   Being so restless that it is hard to sit still Several days Several days   Becoming easily annoyed or irritable Several days Several days   Feeling afraid as if something awful might happen Not at all Several days   KARIE-7 Total Score 6 7   How difficult have these problems made it for you to do your work, take care of things at home, or get along with other people?  Somewhat difficult Somewhat difficult     PHQ-9 Questionaire 10/11/2022 10/10/2022 08/30/2022 56 (A)  >60 Final    Comment: >60 mL/min/1.73m2 EGFR, calc. for ages 25 and older using the  MDRD formula (not corrected for weight), is valid for stable  renal function. GFR  08/30/2022 >60  >60 Final    Comment: Chronic Kidney Disease: less than 60 ml/min/1.73 sq.m. Kidney Failure: less than 15 ml/min/1.73 sq.m. Results valid for patients 18 years and older. Calcium 08/30/2022 8.9  8.3 - 10.6 mg/dL Final    Total Protein 08/30/2022 6.4  6.4 - 8.2 g/dL Final    Albumin 08/30/2022 4.1  3.4 - 5.0 g/dL Final    Albumin/Globulin Ratio 08/30/2022 1.8  1.1 - 2.2 Final    Total Bilirubin 08/30/2022 0.4  0.0 - 1.0 mg/dL Final    Alkaline Phosphatase 08/30/2022 74  40 - 129 U/L Final    ALT 08/30/2022 24  10 - 40 U/L Final    AST 08/30/2022 24  15 - 37 U/L Final    Cholesterol, Total 08/30/2022 194  0 - 199 mg/dL Final    Triglycerides 08/30/2022 146  0 - 150 mg/dL Final    HDL 08/30/2022 58  40 - 60 mg/dL Final    LDL Calculated 08/30/2022 107 (A)  <100 mg/dL Final    VLDL Cholesterol Calculated 08/30/2022 29  Not Established mg/dL Final    Hemoglobin A1C 08/30/2022 6.5  See comment % Final    Comment: Comment:  Diagnosis of Diabetes: > or = 6.5%  Increased risk of diabetes (Prediabetes): 5.7-6.4%  Glycemic Control: Nonpregnant Adults: <7.0%                    Pregnant: <6.0%        eAG 08/30/2022 139.9  mg/dL Final    Microalbumin, Random Urine 08/30/2022 <1.20  <2.0 mg/dL Final    Creatinine, Ur 08/30/2022 128.4  28.0 - 259.0 mg/dL Final    Microalbumin Creatinine Ratio 08/30/2022 see below  0.0 - 30.0 mg/g Final    Comment: Ratio cannot be calculated since microalbumin level is below  the lower detection limit. Bilirubin, Direct 08/30/2022 <0.2  0.0 - 0.3 mg/dL Final    Bilirubin, Indirect 08/30/2022 see below  0.0 - 1.0 mg/dL Final    Comment: Indirect Bilirubin cannot be calculated since Total Bilirubin  and/or Direct Bilirubin is below measurable range.       WBC 2022 2.7 (A)  4.0 - 11.0 K/uL Final    RBC 2022 4.23  4.00 - 5.20 M/uL Final    Hemoglobin 2022 12.6  12.0 - 16.0 g/dL Final    Hematocrit 2022 38.1  36.0 - 48.0 % Final    MCV 2022 89.9  80.0 - 100.0 fL Final    MCH 2022 29.9  26.0 - 34.0 pg Final    MCHC 2022 33.2  31.0 - 36.0 g/dL Final    RDW 2022 13.5  12.4 - 15.4 % Final    Platelets  184  135 - 450 K/uL Final    MPV 2022 9.3  5.0 - 10.5 fL Final       EK2022 QTc 407        Jere Foreman MD  Psychiatrist

## 2022-10-11 ENCOUNTER — OFFICE VISIT (OUTPATIENT)
Dept: PSYCHOLOGY | Age: 62
End: 2022-10-11
Payer: MEDICAID

## 2022-10-11 DIAGNOSIS — F43.23 ADJUSTMENT DISORDER WITH MIXED ANXIETY AND DEPRESSED MOOD: Primary | ICD-10-CM

## 2022-10-11 PROCEDURE — 90791 PSYCH DIAGNOSTIC EVALUATION: CPT

## 2022-10-11 PROCEDURE — 1036F TOBACCO NON-USER: CPT

## 2022-10-11 ASSESSMENT — PATIENT HEALTH QUESTIONNAIRE - PHQ9
8. MOVING OR SPEAKING SO SLOWLY THAT OTHER PEOPLE COULD HAVE NOTICED. OR THE OPPOSITE, BEING SO FIGETY OR RESTLESS THAT YOU HAVE BEEN MOVING AROUND A LOT MORE THAN USUAL: 1
SUM OF ALL RESPONSES TO PHQ QUESTIONS 1-9: 8
5. POOR APPETITE OR OVEREATING: 1
SUM OF ALL RESPONSES TO PHQ QUESTIONS 1-9: 8
SUM OF ALL RESPONSES TO PHQ9 QUESTIONS 1 & 2: 2
4. FEELING TIRED OR HAVING LITTLE ENERGY: 1
3. TROUBLE FALLING OR STAYING ASLEEP: 1
6. FEELING BAD ABOUT YOURSELF - OR THAT YOU ARE A FAILURE OR HAVE LET YOURSELF OR YOUR FAMILY DOWN: 1
SUM OF ALL RESPONSES TO PHQ QUESTIONS 1-9: 8
SUM OF ALL RESPONSES TO PHQ QUESTIONS 1-9: 8
9. THOUGHTS THAT YOU WOULD BE BETTER OFF DEAD, OR OF HURTING YOURSELF: 0
7. TROUBLE CONCENTRATING ON THINGS, SUCH AS READING THE NEWSPAPER OR WATCHING TELEVISION: 1
1. LITTLE INTEREST OR PLEASURE IN DOING THINGS: 1
10. IF YOU CHECKED OFF ANY PROBLEMS, HOW DIFFICULT HAVE THESE PROBLEMS MADE IT FOR YOU TO DO YOUR WORK, TAKE CARE OF THINGS AT HOME, OR GET ALONG WITH OTHER PEOPLE: 1
2. FEELING DOWN, DEPRESSED OR HOPELESS: 1

## 2022-10-11 ASSESSMENT — ANXIETY QUESTIONNAIRES
GAD7 TOTAL SCORE: 6
7. FEELING AFRAID AS IF SOMETHING AWFUL MIGHT HAPPEN: 0
IF YOU CHECKED OFF ANY PROBLEMS ON THIS QUESTIONNAIRE, HOW DIFFICULT HAVE THESE PROBLEMS MADE IT FOR YOU TO DO YOUR WORK, TAKE CARE OF THINGS AT HOME, OR GET ALONG WITH OTHER PEOPLE: SOMEWHAT DIFFICULT
4. TROUBLE RELAXING: 1
6. BECOMING EASILY ANNOYED OR IRRITABLE: 1
5. BEING SO RESTLESS THAT IT IS HARD TO SIT STILL: 1
2. NOT BEING ABLE TO STOP OR CONTROL WORRYING: 1
3. WORRYING TOO MUCH ABOUT DIFFERENT THINGS: 1
1. FEELING NERVOUS, ANXIOUS, OR ON EDGE: 1
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
## 2022-10-12 ENCOUNTER — OFFICE VISIT (OUTPATIENT)
Dept: SLEEP MEDICINE | Age: 62
End: 2022-10-12
Payer: MEDICAID

## 2022-10-12 VITALS
BODY MASS INDEX: 27.8 KG/M2 | HEIGHT: 66 IN | HEART RATE: 74 BPM | RESPIRATION RATE: 18 BRPM | DIASTOLIC BLOOD PRESSURE: 80 MMHG | WEIGHT: 173 LBS | TEMPERATURE: 96.9 F | OXYGEN SATURATION: 100 % | SYSTOLIC BLOOD PRESSURE: 118 MMHG

## 2022-10-12 DIAGNOSIS — Z99.89 DEPENDENCE ON OTHER ENABLING MACHINES AND DEVICES: ICD-10-CM

## 2022-10-12 DIAGNOSIS — G47.33 OSA ON CPAP: Primary | ICD-10-CM

## 2022-10-12 DIAGNOSIS — Z86.79 H/O ISCHEMIC HEART DISEASE: ICD-10-CM

## 2022-10-12 DIAGNOSIS — I10 PRIMARY HYPERTENSION: ICD-10-CM

## 2022-10-12 DIAGNOSIS — Z99.89 OSA ON CPAP: Primary | ICD-10-CM

## 2022-10-12 PROCEDURE — G8427 DOCREV CUR MEDS BY ELIG CLIN: HCPCS | Performed by: PSYCHIATRY & NEUROLOGY

## 2022-10-12 PROCEDURE — 1036F TOBACCO NON-USER: CPT | Performed by: PSYCHIATRY & NEUROLOGY

## 2022-10-12 PROCEDURE — G8417 CALC BMI ABV UP PARAM F/U: HCPCS | Performed by: PSYCHIATRY & NEUROLOGY

## 2022-10-12 PROCEDURE — 99214 OFFICE O/P EST MOD 30 MIN: CPT | Performed by: PSYCHIATRY & NEUROLOGY

## 2022-10-12 PROCEDURE — 3017F COLORECTAL CA SCREEN DOC REV: CPT | Performed by: PSYCHIATRY & NEUROLOGY

## 2022-10-12 PROCEDURE — G8482 FLU IMMUNIZE ORDER/ADMIN: HCPCS | Performed by: PSYCHIATRY & NEUROLOGY

## 2022-10-12 ASSESSMENT — SLEEP AND FATIGUE QUESTIONNAIRES
HOW LIKELY ARE YOU TO NOD OFF OR FALL ASLEEP WHILE SITTING INACTIVE IN A PUBLIC PLACE: 0
HOW LIKELY ARE YOU TO NOD OFF OR FALL ASLEEP WHILE SITTING AND TALKING TO SOMEONE: 0
ESS TOTAL SCORE: 1
HOW LIKELY ARE YOU TO NOD OFF OR FALL ASLEEP WHILE SITTING AND READING: 0
HOW LIKELY ARE YOU TO NOD OFF OR FALL ASLEEP WHILE SITTING QUIETLY AFTER LUNCH WITHOUT ALCOHOL: 0
HOW LIKELY ARE YOU TO NOD OFF OR FALL ASLEEP WHILE WATCHING TV: 0
HOW LIKELY ARE YOU TO NOD OFF OR FALL ASLEEP IN A CAR, WHILE STOPPED FOR A FEW MINUTES IN TRAFFIC: 0
HOW LIKELY ARE YOU TO NOD OFF OR FALL ASLEEP WHILE LYING DOWN TO REST IN THE AFTERNOON WHEN CIRCUMSTANCES PERMIT: 0
HOW LIKELY ARE YOU TO NOD OFF OR FALL ASLEEP WHEN YOU ARE A PASSENGER IN A CAR FOR AN HOUR WITHOUT A BREAK: 1

## 2022-10-12 NOTE — PROGRESS NOTES
Rachel Garza         : 1960        PHONE: [phone] (home)   [x] [facility]     [] [address]      [] [facility]     [] [facility]    [] [facility]  [] [facility]   [] [address] [place] [address]  [] [facility] [] Other:     Diagnosis: [x] CHRIS (G47.33) [] CSA (G47.31) [] Apnea (G47.30)   Length of Need: [] 12 Months [x] 99 Months [] Other:    Machine (KIRK!): [] Respironics Dream Station      Auto [] ResMed AirSense     Auto [] Other:     []  CPAP () [] Bilevel ()   Mode: [] Auto [] Spontaneous    Mode: [] Auto [] Spontaneous                            Comfort Settings:   - Ramp Pressure: 5 cmH2O                                        - Ramp time: 15 min                                     -  Flex/EPR - 3 full time                                    - For ResMed Bilevel (TiMax-4 sec   TiMin- 0.2 sec)     Humidifier: [] Heated ()        [x] Water chamber replacement ()/ 1 per 6 months        Mask:   [x] Nasal () /1 per 3 months [] Full Face () /1 per 3 months   [x] Patient choice -Size and fit mask [] Patient Choice - Size and fit mask   [] Dispense: P10 nasal pillow [] Dispense:    [x] Headgear () / 1 per 3 months [] Headgear () / 1 per 3 months   [x] Replacement Nasal Cushion ()/2 per month [] Interface Replacement ()/1 per month   [x] Replacement Nasal Pillows ()/2 per month         Tubing: [x] Heated ()/1 per 3 months    [] Standard ()/1 per 3 months [] Other:           Filters: [x] Non-disposable ()/1 per 6 months     [x] Ultra-Fine, Disposable ()/2 per month        Miscellaneous: [x] Chin Strap ()/ 1 per 6 months [] O2 bleed-in:       LPM   [] Oximetry on CPAP/Bilevel []  Other:          Start Order Date: 10/12/22    MEDICAL JUSTIFICATION:  I, the undersigned, certify that the above prescribed supplies are medically necessary for this patients wellbeing.   In my opinion, the supplies are both reasonable and necessary in reference to accepted standards of medicalpractice in treatment of this patients condition.     Carrie Wright MD      NPI: [id number]       Order Signed Date: 10/12/22    Electronically signed by Carrie Wright MD on 10/12/2022 at 11:53 AM
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
## 2022-10-12 NOTE — TELEPHONE ENCOUNTER
Cannot be scheduled with Dr. Paniagua March, at this time. I was advised that [facility] ([facility]) has a Neurologist, Dr. Demetrice Raygoza, in [facility] that patient can be referred to if location works for her. His office is in the [facility]. Located at [address] [facility], [address] [place] [address], [address] [place] [address]. Their number is [phone].

## 2022-10-12 NOTE — PATIENT INSTRUCTIONS
Return to see Dr. Sivakumar Delaney in 2 weeks. Please see below for neuropsychological testing referral resources. Neuropsychological Testing    [facility])  Dr. Indiana Escobar and Dr. Jenkins [facility] for appointments/information    [address]   Phone: [phone]              Fax: [phone]    Neuropsychological testing at the [facility]  Call [address]   [phone] [address] [facility] Location  [facility]5 88 [phone] 35 [address] [place] [address]  [email or web address] [facility] ([email or web address]

## 2022-10-14 RX ORDER — LANOLIN ALCOHOL/MO/W.PET/CERES
CREAM (GRAM) TOPICAL
Qty: 60 TABLET | Refills: 3 | Status: SHIPPED | OUTPATIENT
Start: 2022-10-14

## 2022-10-15 ASSESSMENT — ENCOUNTER SYMPTOMS
GASTROINTESTINAL NEGATIVE: 1
RESPIRATORY NEGATIVE: 1
EYES NEGATIVE: 1
ALLERGIC/IMMUNOLOGIC NEGATIVE: 1

## 2022-11-02 DIAGNOSIS — E11.8 TYPE 2 DIABETES MELLITUS WITH COMPLICATIONS (HCC): Primary | ICD-10-CM

## 2022-11-02 DIAGNOSIS — E11.42 DIABETIC POLYNEUROPATHY ASSOCIATED WITH TYPE 2 DIABETES MELLITUS (HCC): ICD-10-CM

## 2022-11-02 DIAGNOSIS — G25.81 RESTLESS LEG SYNDROME: ICD-10-CM

## 2022-11-02 RX ORDER — GABAPENTIN 800 MG/1
TABLET ORAL
Qty: 180 TABLET | OUTPATIENT
Start: 2022-11-02

## 2022-11-02 RX ORDER — GABAPENTIN 800 MG/1
TABLET ORAL
Qty: 90 TABLET | Refills: 0 | Status: CANCELLED | OUTPATIENT
Start: 2022-11-02 | End: 2023-01-31

## 2022-11-02 NOTE — TELEPHONE ENCOUNTER
Please clarify which meds she needs refills on. There is a problem with the diagnosis code some of them are attached to so I think we need to cancel and start over.

## 2022-11-04 ENCOUNTER — TELEPHONE (OUTPATIENT)
Dept: INTERNAL MEDICINE CLINIC | Age: 62
End: 2022-11-04

## 2022-11-04 DIAGNOSIS — F33.1 MODERATE EPISODE OF RECURRENT MAJOR DEPRESSIVE DISORDER (HCC): ICD-10-CM

## 2022-11-04 RX ORDER — TRAZODONE HYDROCHLORIDE 50 MG/1
50 TABLET ORAL NIGHTLY
Qty: 30 TABLET | Refills: 2 | Status: SHIPPED | OUTPATIENT
Start: 2022-11-04

## 2022-11-04 RX ORDER — GABAPENTIN 800 MG/1
800 TABLET ORAL EVERY EVENING
Qty: 90 TABLET | Refills: 0 | Status: SHIPPED | OUTPATIENT
Start: 2022-11-04 | End: 2022-11-18 | Stop reason: ALTCHOICE

## 2022-11-04 RX ORDER — METOPROLOL SUCCINATE 25 MG/1
25 TABLET, EXTENDED RELEASE ORAL DAILY
Qty: 90 TABLET | Refills: 3 | Status: SHIPPED | OUTPATIENT
Start: 2022-11-04

## 2022-11-04 NOTE — TELEPHONE ENCOUNTER
Medication:   Requested Prescriptions     Pending Prescriptions Disp Refills    traZODone (DESYREL) 50 MG tablet 30 tablet 2     Sig: Take 1 tablet by mouth nightly        Last Filled:  10/10/22    Patient Phone Number: [phone] (home)     Last appt: 10/10/2022   Next appt: 1/9/2023    Last OARRS: No flowsheet data found.

## 2022-11-14 ENCOUNTER — OFFICE VISIT (OUTPATIENT)
Dept: INTERNAL MEDICINE CLINIC | Age: 62
End: 2022-11-14
Payer: MEDICAID

## 2022-11-14 VITALS
DIASTOLIC BLOOD PRESSURE: 78 MMHG | OXYGEN SATURATION: 98 % | HEART RATE: 67 BPM | TEMPERATURE: 97.4 F | WEIGHT: 172.4 LBS | BODY MASS INDEX: 27.83 KG/M2 | SYSTOLIC BLOOD PRESSURE: 122 MMHG

## 2022-11-14 DIAGNOSIS — M79.641 RIGHT HAND PAIN: Primary | ICD-10-CM

## 2022-11-14 DIAGNOSIS — G31.84 MILD COGNITIVE IMPAIRMENT: ICD-10-CM

## 2022-11-14 DIAGNOSIS — R26.89 BALANCE DISORDER: ICD-10-CM

## 2022-11-14 PROCEDURE — 1036F TOBACCO NON-USER: CPT | Performed by: INTERNAL MEDICINE

## 2022-11-14 PROCEDURE — 99214 OFFICE O/P EST MOD 30 MIN: CPT | Performed by: INTERNAL MEDICINE

## 2022-11-14 PROCEDURE — G8427 DOCREV CUR MEDS BY ELIG CLIN: HCPCS | Performed by: INTERNAL MEDICINE

## 2022-11-14 PROCEDURE — G8482 FLU IMMUNIZE ORDER/ADMIN: HCPCS | Performed by: INTERNAL MEDICINE

## 2022-11-14 PROCEDURE — 3078F DIAST BP <80 MM HG: CPT | Performed by: INTERNAL MEDICINE

## 2022-11-14 PROCEDURE — 3074F SYST BP LT 130 MM HG: CPT | Performed by: INTERNAL MEDICINE

## 2022-11-14 PROCEDURE — 3017F COLORECTAL CA SCREEN DOC REV: CPT | Performed by: INTERNAL MEDICINE

## 2022-11-14 PROCEDURE — G8417 CALC BMI ABV UP PARAM F/U: HCPCS | Performed by: INTERNAL MEDICINE

## 2022-11-14 NOTE — PATIENT INSTRUCTIONS
Take zyrtec before bed to improve daytime sleepiness    Can take occasional nsaid or voltaren gel    Hand xray  [address]    PT for balance    Consider aquatic therapy for right knee and ankle and hip pain if persists.  Okay to take occasional Nsaid but not daily

## 2022-11-14 NOTE — PROGRESS NOTES
Ling Hester (:  1960) is a 60 y.o. female, here for evaluation of the following chief complaint(s):    Follow-up      ASSESSMENT/PLAN:  1. Right hand pain  -     XR HAND RIGHT (MIN 3 VIEWS); Future  Okay to take an occasional NSAID or use Voltaren gel  2. Balance disorder  May also consider aquatic therapy for arthritic pain which is likely contributing to her balance problems. -     [facility] - [place]  3. Mild cognitive impairment  Advised taking her Zyrtec before bed to improve her daytime sleepiness  ---  Other specified hypothyroidism  Due to issues with getting the correct amount of levothyroxine every month, I had changed her prescription to 75 mcg daily but skip 1 day/week. She will be due for repeat thyroid ultrasound in around . Primary insomnia  Her insomnia is well controlled with melatonin, trazodone, gabapentin. Type 2 diabetes mellitus with complications [facility])  Patient saw endocrinology. Remains on insulin and metformin and now jardiance. History of leukemia  -     Sees Dr. Zoey Lozoya. Chronic ischemic heart disease  -     Saw Dr Hiral Griffin who adjusted meds - on metoprolol, asa/statin  CORONA (nonalcoholic steatohepatitis)  Had u/s of liver with GI  Gastroesophageal reflux disease without esophagitis  -     remains on PPI. Had normal EGD per pt      Diabetic polyneuropathy associated with type 2 diabetes mellitus   Stable on gabapentin     Other hyperlipidemia  -    stable on atorvastatin  If not yet started leqvio     B12 deficiency  -   Anxiety and depression   Patient feels her symptoms are better controlled. Sees Dr. Lencho Dickey pending. Remains on duloxetine     Mild persistent asthma without complication  -    stable on albuterol and zyrtec     Restless leg syndrome  May relate to her SNRI. Continue gabapentin.       Sleep apnea, unspecified type  -     [facility] - cpap advised  Return in about 6 months (around 5/14/2023). SUBJECTIVE/OBJECTIVE:  HPI  Patient is here for routine visit. We reviewed her visits with specialist.  She was having some abdominal distention but this is improved since starting down the pressure on her CPAP. She is taking gabapentin 400 mg before bed only. She will soon finally see neurology regarding her memory. She states that physical therapy helped her neck pain and so she feels less tired and less foggy. She also states that the medication adjustments with Dr. Judith Friend have helped her feel better. Eventually she would like to stop gabapentin. She states she only takes Lasix as needed for swelling. She said the restless legs have not been too bad lately. She does complain of stiffness in her right hand and fingers 2 through 4. She also complains of right knee and ankle and hip pain. She reports a history of carpal tunnel syndrome and thoracic outlet syndrome. Her ear ringing persists and she is taking B complex vitamins for this. Review of Systems    Past Medical History:   Diagnosis Date    Anxiety and depression     Asthma     AV block, 1st degree     B12 deficiency     Balance disorder     frequent falls    CAD (coronary artery disease)     Chronic ischemic heart disease     sp stents-was at [facility]-dr davalos?     CVA (cerebral vascular accident) [facility])     2003    Diabetic polyneuropathy associated with type 2 diabetes mellitus ([address]     Diabetic retinopathy ([address]     Essential (primary) hypertension     GERD (gastroesophageal reflux disease)     H/O heart artery stent     X3    H/O total hysterectomy     Hyperparathyroidism ([address]     Hypothyroidism     Leukemia ([address]     acute lymphoblastic leukemia- [facility]    Low back pain     Mild intermittent asthma without complication     cold induced    Muscle spasms of neck     CORONA (nonalcoholic steatohepatitis)     saw dr Bell Phillip    CHRIS (obstructive sleep apnea)     Osteoarthritis     hands knees, hips    Osteopenia 2021    dexa    Other hyperlipidemia     PSVT (paroxysmal supraventricular tachycardia) ([facility])     Restless leg syndrome     Sleep apnea     mild    Subarachnoid hemorrhage ([address] 2003    Thyroid nodule 09/2022    fu 6m    Type 2 diabetes mellitus with complications ([facility])        Current Outpatient Medications   Medication Sig Dispense Refill    metoprolol succinate (TOPROL XL) 25 MG extended release tablet Take 1 tablet by mouth daily 90 tablet 3    traZODone (DESYREL) 50 MG tablet Take 1 tablet by mouth nightly 30 tablet 2    empagliflozin (JARDIANCE) 25 MG tablet Take 1 tablet by mouth daily 90 tablet 1    magnesium oxide (MAG-OX) 400 (240 Mg) MG tablet TAKE ONE TABLET BY MOUTH TWICE A DAY 60 tablet 3    DULoxetine (CYMBALTA) 30 MG extended release capsule Take 1 capsule by mouth daily Take with the 60mg capsule for a total of 90mg 90 capsule 1    methocarbamol (ROBAXIN) 500 MG tablet       insulin glargine (LANTUS SOLOSTAR) 100 UNIT/ML injection pen Inject 29 Units into the skin nightly 5 Adjustable Dose Pre-filled Pen Syringe 0    levothyroxine (SYNTHROID) 75 MCG tablet Take 1 tablet by mouth Six times weekly 90 tablet 1    atorvastatin (LIPITOR) 40 MG tablet Take 1 tablet by mouth daily 90 tablet 1    Continuous Blood Gluc Sensor (FREESTYLE RAND 2 SENSOR) MISC Change sensor q 14 days 2 each 3    Inclisiran Sodium (LEQVIO) 284 MG/1.5ML Inject 1.5 mLs into the skin every 6 months 1.5 mL 5    Continuous Blood Gluc Sensor (FREESTYLE RAND 14 DAY SENSOR) MISC       furosemide (LASIX) 20 MG tablet TAKE ONE TABLET DAILY AS NEEDED FOR EDEMA 30 tablet 3    DULoxetine (CYMBALTA) 60 MG extended release capsule TAKE 1 CAPSULE BY MOUTH  DAILY 90 capsule 1    metFORMIN (GLUCOPHAGE) 500 MG tablet TAKE 2 TABLETS BY MOUTH  TWICE DAILY (Patient taking differently: 800 mg daily) 360 tablet 1    pantoprazole (PROTONIX) 40 MG tablet TAKE 1 TABLET BY MOUTH  DAILY 90 tablet 1    Blood Glucose Monitoring Suppl (ONE TOUCH ULTRA 2) w/Device KIT 1 kit by Does not apply route daily 1 kit 0    Lancets MISC 1 each by Does not apply route 2 times daily 100 each 5    blood glucose monitor strips Test 2 times a day & as needed for symptoms of irregular blood glucose 100 strip 5    Insulin Pen Needle 32G X 5 MM MISC 1 each by Other route 5 times daily 100 each 11    Ascorbic Acid (VITAMIN C) 1000 MG tablet Take 500 mg by mouth daily      aspirin 81 MG EC tablet Take 81 mg by mouth daily      Calcium Carbonate-Vitamin D (OYSTER SHELL CALCIUM/D) 500-200 MG-UNIT TABS Take 1 tablet by mouth daily      Cholecalciferol (VITAMIN D3) 125 MCG (5000 UT) CAPS 1,000 Units      cyanocobalamin 250 MCG tablet Take 250 mcg by mouth daily      Melatonin 10 MG SUBL       albuterol sulfate  (90 Base) MCG/ACT inhaler Inhale 2 puffs into the lungs every 6 hours as needed for Wheezing 18 g 5    insulin lispro, 1 Unit Dial, (HUMALOG KWIKPEN) 100 UNIT/ML SOPN Use up  To 35 units daily per sliding scale 10 pen 1    cetirizine (ZYRTEC ALLERGY) 10 MG tablet Take 1 tablet by mouth daily      gabapentin (NEURONTIN) 100 MG capsule Take 1 capsule by mouth 3 times daily for 10 days. 30 capsule 0     No current facility-administered medications for this visit. Physical Exam  Vitals reviewed. Constitutional:       General: She is not in acute distress. HENT:      Head: Normocephalic and atraumatic. Cardiovascular:      Rate and Rhythm: Normal rate and regular rhythm. Pulmonary:      Effort: Pulmonary effort is normal.      Breath sounds: Normal breath sounds. Musculoskeletal:      Right lower leg: No edema. Left lower leg: No edema. Neurological:      Mental Status: She is alert and oriented to person, place, and time. Mental status is at baseline.    Psychiatric:         Mood and Affect: Mood normal.         On this date 11/14/2022 I have spent 30 minutes reviewing previous notes, test results and face to face with the patient discussing the diagnosis and importance of compliance with the treatment plan as well as documenting on the day of the visit. This note was generated completely or in part utilizing Dragon dictation speech recognition software. Occasionally, words are mistranscribed and despite editing, the text may contain inaccuracies due to incorrect word recognition. If further clarification is needed please contact the office at [phone]          An electronic signature was used to authenticate this note.     --Alyssia Richardson MD

## 2022-12-07 ENCOUNTER — OFFICE VISIT (OUTPATIENT)
Dept: NEUROLOGY | Age: 62
End: 2022-12-07
Payer: MEDICAID

## 2022-12-07 VITALS
SYSTOLIC BLOOD PRESSURE: 139 MMHG | WEIGHT: 172 LBS | HEART RATE: 76 BPM | BODY MASS INDEX: 27.64 KG/M2 | DIASTOLIC BLOOD PRESSURE: 82 MMHG | HEIGHT: 66 IN

## 2022-12-07 DIAGNOSIS — G31.84 MILD COGNITIVE IMPAIRMENT: Primary | ICD-10-CM

## 2022-12-07 DIAGNOSIS — E11.42 DIABETIC PERIPHERAL NEUROPATHY (HCC): ICD-10-CM

## 2022-12-07 PROCEDURE — 2022F DILAT RTA XM EVC RTNOPTHY: CPT | Performed by: PSYCHIATRY & NEUROLOGY

## 2022-12-07 PROCEDURE — G8427 DOCREV CUR MEDS BY ELIG CLIN: HCPCS | Performed by: PSYCHIATRY & NEUROLOGY

## 2022-12-07 PROCEDURE — 99244 OFF/OP CNSLTJ NEW/EST MOD 40: CPT | Performed by: PSYCHIATRY & NEUROLOGY

## 2022-12-07 PROCEDURE — 3074F SYST BP LT 130 MM HG: CPT | Performed by: PSYCHIATRY & NEUROLOGY

## 2022-12-07 PROCEDURE — G8417 CALC BMI ABV UP PARAM F/U: HCPCS | Performed by: PSYCHIATRY & NEUROLOGY

## 2022-12-07 PROCEDURE — 3078F DIAST BP <80 MM HG: CPT | Performed by: PSYCHIATRY & NEUROLOGY

## 2022-12-07 PROCEDURE — G8482 FLU IMMUNIZE ORDER/ADMIN: HCPCS | Performed by: PSYCHIATRY & NEUROLOGY
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
## 2022-12-13 ENCOUNTER — OFFICE VISIT (OUTPATIENT)
Dept: ENDOCRINOLOGY | Age: 62
End: 2022-12-13
Payer: MEDICAID

## 2022-12-13 DIAGNOSIS — E11.42 DIABETIC POLYNEUROPATHY ASSOCIATED WITH TYPE 2 DIABETES MELLITUS (HCC): Primary | ICD-10-CM

## 2022-12-13 PROCEDURE — G8428 CUR MEDS NOT DOCUMENT: HCPCS | Performed by: DIETITIAN, REGISTERED

## 2022-12-13 PROCEDURE — G8417 CALC BMI ABV UP PARAM F/U: HCPCS | Performed by: DIETITIAN, REGISTERED

## 2022-12-13 PROCEDURE — 3044F HG A1C LEVEL LT 7.0%: CPT | Performed by: DIETITIAN, REGISTERED

## 2022-12-13 PROCEDURE — 97803 MED NUTRITION INDIV SUBSEQ: CPT | Performed by: DIETITIAN, REGISTERED

## 2022-12-13 NOTE — PROGRESS NOTES
Medical Nutrition Therapy for Diabetes    Beny Dai  December 13, 2022      Patient Care Team:  Jaimie Martinez MD as PCP - General (Internal Medicine)  Jaimie Martinez MD as PCP - [facility] Empaneled Provider  Ayo Live MD as Consulting Physician (Gastroenterology)  Maral Qureshi MD as Consulting Physician (Psychiatry)  Jihan Angel PSYD (Clinical Psychologist)    Reason for visit: uncontrolled, Type 2 Diabetes    ASSESSMENT/PLAN:   NUTRITION DIAGNOSIS  Follow up    #1 Problem: Altered Nutrition-Related Laboratory Values (NC-2.2)  Related to: Endocrine/Diabetes   As Evidenced by: Elevated Plasma glucose and/or HgbA1c levels          #2 Problem: Excessive Carbohydrate Intake (NI-5.8. 2)  Related to: Food-and nutrition-related knowledge deficit concerning appropriate amount of carbohydrate intake  As evidenced by: Estimated carbohydrate intake that is consistently more than the recommended amounts      #3 Problem: Knowledge and Beliefs-3.1                     Food and nutrition deficits      NUTRITION INTERVENTION  Nutrition Prescription: 30 grams carbohydrate per meal with protein and non-starch vegetables  15 gram carbohydrate snacks     Diabetes Education/Counseling included:  Discussed impact of activity on frequency of hypoglycemia. Explained rational for achieving adequate carbohydrate daily. Interventions:  Recommended having between meal snacks and bedtime snack of 15 gram carbohydrate. Encouraged continuing 30 gram carbohydrate meals with protein.   NUTRITION MONITORING AND EVALUATION  3 meals and 3 snacks  30 gram carbohydrate meals/ 15 gram carbohydrate snacks       Patient Active Problem List   Diagnosis    Chronic ischemic heart disease    Diabetic polyneuropathy associated with type 2 diabetes mellitus (HCC)    GERD (gastroesophageal reflux disease)    Leukemia (HCC)    Hypothyroidism    CORONA (nonalcoholic steatohepatitis)    Other hyperlipidemia    Type 2 diabetes mellitus with complications (HCC)    Restless leg syndrome    Mild intermittent asthma without complication    Balance disorder    CHRIS (obstructive sleep apnea)    Coronary artery disease involving native coronary artery of native heart without angina pectoris    Primary hypertension    Palpitations    Moderate episode of recurrent major depressive disorder (HCC)    KARIE (generalized anxiety disorder)    Type 2 diabetes mellitus, uncontrolled, with neuropathy    Overweight (BMI 25.0-29. 9)    Multiple thyroid nodules    Neck mass       Current Outpatient Medications   Medication Sig Dispense Refill    gabapentin (NEURONTIN) 100 MG capsule Take 1 capsule by mouth 3 times daily for 10 days.  30 capsule 0    metoprolol succinate (TOPROL XL) 25 MG extended release tablet Take 1 tablet by mouth daily 90 tablet 3    traZODone (DESYREL) 50 MG tablet Take 1 tablet by mouth nightly 30 tablet 2    empagliflozin (JARDIANCE) 25 MG tablet Take 1 tablet by mouth daily 90 tablet 1    magnesium oxide (MAG-OX) 400 (240 Mg) MG tablet TAKE ONE TABLET BY MOUTH TWICE A DAY 60 tablet 3    DULoxetine (CYMBALTA) 30 MG extended release capsule Take 1 capsule by mouth daily Take with the 60mg capsule for a total of 90mg 90 capsule 1    methocarbamol (ROBAXIN) 500 MG tablet       insulin glargine (LANTUS SOLOSTAR) 100 UNIT/ML injection pen Inject 29 Units into the skin nightly 5 Adjustable Dose Pre-filled Pen Syringe 0    levothyroxine (SYNTHROID) 75 MCG tablet Take 1 tablet by mouth Six times weekly 90 tablet 1    atorvastatin (LIPITOR) 40 MG tablet Take 1 tablet by mouth daily 90 tablet 1    Continuous Blood Gluc Sensor (FREESTYLE RAND 2 SENSOR) MISC Change sensor q 14 days 2 each 3    Inclisiran Sodium (LEQVIO) 284 MG/1.5ML Inject 1.5 mLs into the skin every 6 months 1.5 mL 5    Continuous Blood Gluc Sensor (FREESTYLE RAND 14 DAY SENSOR) MISC       furosemide (LASIX) 20 MG tablet TAKE ONE TABLET DAILY AS NEEDED FOR EDEMA 30 tablet 3 DULoxetine (CYMBALTA) 60 MG extended release capsule TAKE 1 CAPSULE BY MOUTH  DAILY 90 capsule 1    metFORMIN (GLUCOPHAGE) 500 MG tablet TAKE 2 TABLETS BY MOUTH  TWICE DAILY (Patient taking differently: 800 mg daily) 360 tablet 1    pantoprazole (PROTONIX) 40 MG tablet TAKE 1 TABLET BY MOUTH  DAILY 90 tablet 1    Blood Glucose Monitoring Suppl (ONE TOUCH ULTRA 2) w/Device KIT 1 kit by Does not apply route daily 1 kit 0    Lancets MISC 1 each by Does not apply route 2 times daily 100 each 5    blood glucose monitor strips Test 2 times a day & as needed for symptoms of irregular blood glucose 100 strip 5    Insulin Pen Needle 32G X 5 MM MISC 1 each by Other route 5 times daily 100 each 11    Ascorbic Acid (VITAMIN C) 1000 MG tablet Take 500 mg by mouth daily      aspirin 81 MG EC tablet Take 81 mg by mouth daily      Calcium Carbonate-Vitamin D (OYSTER SHELL CALCIUM/D) 500-200 MG-UNIT TABS Take 1 tablet by mouth daily      Cholecalciferol (VITAMIN D3) 125 MCG (5000 UT) CAPS 1,000 Units      cyanocobalamin 250 MCG tablet Take 250 mcg by mouth daily      Melatonin 10 MG SUBL       albuterol sulfate  (90 Base) MCG/ACT inhaler Inhale 2 puffs into the lungs every 6 hours as needed for Wheezing 18 g 5    insulin lispro, 1 Unit Dial, (HUMALOG KWIKPEN) 100 UNIT/ML SOPN Use up  To 35 units daily per sliding scale 10 pen 1    cetirizine (ZYRTEC ALLERGY) 10 MG tablet Take 1 tablet by mouth daily       No current facility-administered medications for this visit.          NUTRITION ASSESSMENT    Biochemical Data:    Lab Results   Component Value Date    LABA1C 6.5 08/30/2022     Lab Results   Component Value Date    .9 08/30/2022       Lab Results   Component Value Date    CHOL 194 08/30/2022    CHOL 168 04/06/2022     Lab Results   Component Value Date    TRIG 146 08/30/2022    TRIG 158 (H) 04/06/2022     Lab Results   Component Value Date    HDL 58 08/30/2022    HDL 57 04/06/2022     Lab Results   Component Value Date    LDLCALC 107 (H) 08/30/2022    LDLCALC 79 04/06/2022     Lab Results   Component Value Date    LABVLDL 29 08/30/2022    LABVLDL 32 04/06/2022     No results found for: [facility]    Lab Results   Component Value Date    WBC 2.7 (L) 08/30/2022    HGB 12.6 08/30/2022    HCT 38.1 08/30/2022    MCV 89.9 08/30/2022     08/30/2022       Lab Results   Component Value Date    CREATININE 1.0 08/30/2022    BUN 19 08/30/2022     (H) 08/30/2022    K 4.2 08/30/2022     08/30/2022    CO2 26 08/30/2022       Diabetes Medications: Yes  Knows name and dose of prescribed medications Yes  Knows prescribed schedule for medicationsYes  Recent change in medication type/dosage: No  Stores  medications properlyYes  Comments:     Monitoring:   Has BG meter: Yes  Testing frequency: 3-4 / day  Recent results: , 300 (1 episode recently-unexplained)                           Mela 2 indicated glucose is at 85% target  Hypoglycemia? Yes    Anthropometric Measurements: Wt:   Wt Readings from Last 3 Encounters:   12/07/22 172 lb (78 kg)   11/14/22 172 lb 6.4 oz (78.2 kg)   10/12/22 173 lb (78.5 kg)      BMI:   BMI Readings from Last 3 Encounters:   12/07/22 27.76 kg/m²   11/14/22 27.83 kg/m²   10/12/22 27.92 kg/m²     Patient's stated goal weight:   7% Weight loss goal weight:     Physical Activity History:   Physical activity: walking, housework  Frequency of activity: daily  Duration of activity: not answered, Patient says \"I have a lot more energy now. \"  Obstacles to activity: none    Sleep: using C-pap, takes trazadone     Food and Nutrition History:   Nutrition Awareness/Previous DSMES: yes  Number of people in household: 1  Frequency of Meals Eaten away from home:2/week  Food Availability Problems  Within the past 12 months, have you worried that your food would run out before you got money to buy more? No  Within the past 12 months, has the food you bought not lasted till the end of the month and you didn't have money to get more? No  Beverage consumption: water - 60 ounces, diet pop occasionally  Alcohol consumption: No  Usual Food consumption:   3 meals, 30 gram carbohydrate meals, snacks not typically including in eating pattern     Barriers:   -none          Follow Up Plan: Will remain available. Contact information given.      Referring Provider: Kayleigh Frias MD  Time spent with patient: 30 minutes

## 2022-12-20 ENCOUNTER — PATIENT MESSAGE (OUTPATIENT)
Dept: INTERNAL MEDICINE CLINIC | Age: 62
End: 2022-12-20

## 2022-12-20 DIAGNOSIS — F41.9 ANXIETY AND DEPRESSION: ICD-10-CM

## 2022-12-20 DIAGNOSIS — F32.A ANXIETY AND DEPRESSION: ICD-10-CM

## 2022-12-20 DIAGNOSIS — K21.9 GASTROESOPHAGEAL REFLUX DISEASE WITHOUT ESOPHAGITIS: ICD-10-CM

## 2022-12-20 DIAGNOSIS — F33.1 MODERATE EPISODE OF RECURRENT MAJOR DEPRESSIVE DISORDER (HCC): ICD-10-CM

## 2022-12-20 RX ORDER — DULOXETIN HYDROCHLORIDE 60 MG/1
60 CAPSULE, DELAYED RELEASE ORAL DAILY
Qty: 90 CAPSULE | Refills: 1 | Status: SHIPPED | OUTPATIENT
Start: 2022-12-20

## 2022-12-20 RX ORDER — DULOXETIN HYDROCHLORIDE 60 MG/1
60 CAPSULE, DELAYED RELEASE ORAL DAILY
Qty: 90 CAPSULE | Refills: 1 | Status: CANCELLED | OUTPATIENT
Start: 2022-12-20

## 2022-12-20 RX ORDER — PANTOPRAZOLE SODIUM 40 MG/1
40 TABLET, DELAYED RELEASE ORAL DAILY
Qty: 90 TABLET | Refills: 1 | Status: CANCELLED | OUTPATIENT
Start: 2022-12-20

## 2022-12-20 RX ORDER — PANTOPRAZOLE SODIUM 40 MG/1
40 TABLET, DELAYED RELEASE ORAL DAILY
Qty: 90 TABLET | Refills: 1 | Status: SHIPPED | OUTPATIENT
Start: 2022-12-20

## 2022-12-20 NOTE — TELEPHONE ENCOUNTER
From: Thalia Azul  To: Dr. Godoy Le: 12/20/2022 12:45 PM EST  Subject: Refills requested    Hi Dr Saumya Stone, last time I requested refills on 60 mg Duloxetine and 40 mg Pantoprazole, the pharmacy called my old doctor in [place], and for some reason they filled it. Can you please send a prescription for both items, preferably 90 day supply? Thanks! I hope you and yours have a wonderful holiday season!  Sincerely, Cheryl Zamorano

## 2023-01-03 RX ORDER — FLASH GLUCOSE SENSOR
KIT MISCELLANEOUS
Qty: 2 EACH | Refills: 0 | Status: SHIPPED | OUTPATIENT
Start: 2023-01-03 | End: 2023-02-02 | Stop reason: SDUPTHER

## 2023-01-03 RX ORDER — INSULIN GLARGINE 100 [IU]/ML
INJECTION, SOLUTION SUBCUTANEOUS
Qty: 15 ML | Refills: 0 | Status: SHIPPED | OUTPATIENT
Start: 2023-01-03 | End: 2023-01-25 | Stop reason: SDUPTHER

## 2023-01-06 NOTE — PROGRESS NOTES
PSYCHIATRY PROGRESS NOTE    Lindsey Quiroz  1960  01/09/23  Face to Face time: 30 minutes  PCP: Janusz Barrow MD    CC:   Chief Complaint   Patient presents with    Follow-up    Depression     Patient is a 59-year-old female with a past medical history significant for leukemia at age 11, subarachnoid hemorrhage, CHRIS, hypertension, RLS, diabetes with polyneuropathy, coronary artery disease status post stenting, GERD, hypothyroidism, and arthritis who presents to the outpatient psychiatric clinic today for evaluation management of depression and anxiety. A:  Patient's presentation today is indicative of some ongoing difficulties with pain management potentially leading to worsening depression and anxiety symptoms. We will attempt to adjust the patient's medication regimen in order to provide her with ongoing support. Diagnosis:  Major depressive disorder, recurrent moderate  Generalized anxiety disorder  Concerns for a mild cognitive impairment    P:   1. We will plan to continue the patient's current medication regimen of duloxetine 90 mg daily. It was advised that the patient consider trialing moving the 30 mg dosage further into the day to see if this helps with some of her neuropathic pains later in the day  2. We will plan to increase the patient's trazodone to 75 mg nightly for treatment of insomnia. Patient was cautioned regarding adverse effects this medication as had been described to her previously    Medication Monitoring:    - PDMP reviewed: No interval change    Follow-up: 2 months    Safety: Pt was counseled on the potential for increased suicidal ideations and advised on potential options for dealing with these including hotlines, calling the office, or going to the nearest emergency room. __________________________________________________________________________    S:   Patient indicated that she's having a lot of problems with sleep at this time.   She noted that she recently came off of gabapentin and found that it's a lot harder to get to sleep because of her reported neuropathy. She stated that she trialed taking 1.5 of her trazodones and found that to be helpful, however she ran out early (which wasn't helpful). She stated that she's been a bit more down without the gabapentin and with the worse sleep, though she's not sure what the ultimate cause of her mood changes is. She stated she's getting about 6-7 hours of sleep a night but feels that she needs a solid 8. The patient denied any SI/HI/AVH. ROS:  Review of Systems   Constitutional:  Positive for fatigue. HENT: Negative. Eyes: Negative. Respiratory: Negative. Cardiovascular: Negative. Gastrointestinal: Negative. Endocrine: Negative. Genitourinary: Negative. Musculoskeletal:  Positive for back pain. Skin: Negative. Allergic/Immunologic: Negative. Neurological: Negative. Positive for neuropathy   Hematological: Negative. Psychiatric/Behavioral:  Positive for dysphoric mood and sleep disturbance. The patient is nervous/anxious. Brief Medical Hx:   Patient Active Problem List   Diagnosis    Chronic ischemic heart disease    Diabetic polyneuropathy associated with type 2 diabetes mellitus (HCC)    GERD (gastroesophageal reflux disease)    Leukemia (HCC)    Hypothyroidism    CORONA (nonalcoholic steatohepatitis)    Other hyperlipidemia    Type 2 diabetes mellitus with complications (HCC)    Restless leg syndrome    Mild intermittent asthma without complication    Balance disorder    CHRIS (obstructive sleep apnea)    Coronary artery disease involving native coronary artery of native heart without angina pectoris    Primary hypertension    Palpitations    Moderate episode of recurrent major depressive disorder (HCC)    KARIE (generalized anxiety disorder)    Type 2 diabetes mellitus, uncontrolled, with neuropathy    Overweight (BMI 25.0-29. 9)    Multiple thyroid nodules Neck mass        Brief Psych Hx:  Hosp: Denied  Diagnoses: Depression and anxiety  Med trials: Amitriptyline for years, nortriptyline  Outpt: Previously seen multiple therapists but no psychiatrist  NSSI: Denied  Suicide Attempts: Denied    O:  Wt Readings from Last 3 Encounters:   01/09/23 168 lb 12.8 oz (76.6 kg)   12/07/22 172 lb (78 kg)   11/14/22 172 lb 6.4 oz (78.2 kg)       Vitals:    01/09/23 1337   BP: 110/78   Pulse: 88   SpO2: 93%   Weight: 168 lb 12.8 oz (76.6 kg)       Mental Status Exam:   Appearance:    Appropriately dressed  Motor: No abnormal movements, tics or mannerisms. Eye Contact: Good  Speech:    Appropriate rate and rhythm  Language:   Appropriate diction  Mood/Affect:  \" Okay\"/continued positive response with appropriate stimuli  Thought Process:   Generally linear, logical  Thought Content:    Topic-appropriate, no SI/HI  Hallucinations:   Denied, not seem to be responding to internal stimuli  Associations:   Intact  Attention/Concentration:   Struggled with concentration testing. Days of the week forward she missed the starting date. Days of the week backward she went past the starting point  Orientation:    Alert and oriented x4  Memory:   1 out of 3 on delayed recall  Fund of Knowledge:    Appropriate for age and education  Insight/Judgement:   Intact/intact      PHQ-9 Questionaire 1/9/2023 10/11/2022   Little interest or pleasure in doing things 2 1   Feeling down, depressed, or hopeless 2 1   Trouble falling or staying asleep, or sleeping too much 2 1   Feeling tired or having little energy 2 1   Poor appetite or overeating 1 1   Feeling bad about yourself - or that you are a failure or have let yourself or your family down 1 1   Trouble concentrating on things, such as reading the newspaper or watching television 1 1   Moving or speaking so slowly that other people could have noticed.  Or the opposite - being so fidgety or restless that you have been moving around a lot more than usual 1 1   Thoughts that you would be better off dead, or of hurting yourself in some way 1 0   PHQ-9 Total Score 13 8   If you checked off any problems, how difficult have these problems made it for you to do your work, take care of things at home, or get along with other people? 1 1     KARIE-7 SCREENING 1/9/2023 10/11/2022   Feeling nervous, anxious, or on edge More than half the days Several days   Not being able to stop or control worrying Several days Several days   Worrying too much about different things Several days Several days   Trouble relaxing More than half the days Several days   Being so restless that it is hard to sit still Several days Several days   Becoming easily annoyed or irritable More than half the days Several days   Feeling afraid as if something awful might happen Several days Not at all   KARIE-7 Total Score 10 6   How difficult have these problems made it for you to do your work, take care of things at home, or get along with other people? Somewhat difficult Somewhat difficult        Labs:     Orders Only on 08/30/2022   Component Date Value Ref Range Status    Anti-Thyroglob Abs 08/30/2022 13  <115 IU/mL Final    Thyroid Peroxidase (TPO) Abs 08/30/2022 8  <34 IU/mL Final    Comment: Please note, patients with rheumatoid factor levels > 450 IU/mL  may have falsely elevated anti-TPO. Please review results critically  and correlate with clinical findings.      T3, Free 08/30/2022 2.2 (A)  2.3 - 4.2 pg/mL Final    T4 Free 08/30/2022 1.4  0.9 - 1.8 ng/dL Final    TSH 08/30/2022 1.75  0.27 - 4.20 uIU/mL Final    C-Peptide 08/30/2022 2.6  1.1 - 4.4 ng/mL Final    [facility] [address] [place] [address] ([phone]    Sodium 08/30/2022 146 (A)  136 - 145 mmol/L Final    Potassium 08/30/2022 4.2  3.5 - 5.1 mmol/L Final    Chloride 08/30/2022 108  99 - 110 mmol/L Final    CO2 08/30/2022 26  21 - 32 mmol/L Final    Anion Gap 08/30/2022 12  3 - 16 Final    Glucose 08/30/2022 155 (A)  70  - 99 mg/dL Final    BUN 08/30/2022 19  7 - 20 mg/dL Final    Creatinine 08/30/2022 1.0  0.6 - 1.2 mg/dL Final    GFR Non- 08/30/2022 56 (A)  >60 Final    Comment: >60 mL/min/1.73m2 EGFR, calc. for ages 25 and older using the  MDRD formula (not corrected for weight), is valid for stable  renal function. GFR  08/30/2022 >60  >60 Final    Comment: Chronic Kidney Disease: less than 60 ml/min/1.73 sq.m. Kidney Failure: less than 15 ml/min/1.73 sq.m. Results valid for patients 18 years and older. Calcium 08/30/2022 8.9  8.3 - 10.6 mg/dL Final    Total Protein 08/30/2022 6.4  6.4 - 8.2 g/dL Final    Albumin 08/30/2022 4.1  3.4 - 5.0 g/dL Final    Albumin/Globulin Ratio 08/30/2022 1.8  1.1 - 2.2 Final    Total Bilirubin 08/30/2022 0.4  0.0 - 1.0 mg/dL Final    Alkaline Phosphatase 08/30/2022 74  40 - 129 U/L Final    ALT 08/30/2022 24  10 - 40 U/L Final    AST 08/30/2022 24  15 - 37 U/L Final    Cholesterol, Total 08/30/2022 194  0 - 199 mg/dL Final    Triglycerides 08/30/2022 146  0 - 150 mg/dL Final    HDL 08/30/2022 58  40 - 60 mg/dL Final    LDL Calculated 08/30/2022 107 (A)  <100 mg/dL Final    VLDL Cholesterol Calculated 08/30/2022 29  Not Established mg/dL Final    Hemoglobin A1C 08/30/2022 6.5  See comment % Final    Comment: Comment:  Diagnosis of Diabetes: > or = 6.5%  Increased risk of diabetes (Prediabetes): 5.7-6.4%  Glycemic Control: Nonpregnant Adults: <7.0%                    Pregnant: <6.0%        eAG 08/30/2022 139.9  mg/dL Final    Microalbumin, Random Urine 08/30/2022 <1.20  <2.0 mg/dL Final    Creatinine, Ur 08/30/2022 128.4  28.0 - 259.0 mg/dL Final    Microalbumin Creatinine Ratio 08/30/2022 see below  0.0 - 30.0 mg/g Final    Comment: Ratio cannot be calculated since microalbumin level is below  the lower detection limit.       Bilirubin, Direct 08/30/2022 <0.2  0.0 - 0.3 mg/dL Final    Bilirubin, Indirect 08/30/2022 see below  0.0 - 1.0 mg/dL Final Comment: Indirect Bilirubin cannot be calculated since Total Bilirubin  and/or Direct Bilirubin is below measurable range.       WBC 2022 2.7 (A)  4.0 - 11.0 K/uL Final    RBC 2022 4.23  4.00 - 5.20 M/uL Final    Hemoglobin 2022 12.6  12.0 - 16.0 g/dL Final    Hematocrit 2022 38.1  36.0 - 48.0 % Final    MCV 2022 89.9  80.0 - 100.0 fL Final    MCH 2022 29.9  26.0 - 34.0 pg Final    MCHC 2022 33.2  31.0 - 36.0 g/dL Final    RDW 2022 13.5  12.4 - 15.4 % Final    Platelets  184  135 - 450 K/uL Final    MPV 2022 9.3  5.0 - 10.5 fL Final       [id number] QTc 407        Jere Foreman MD  Psychiatrist

## 2023-01-09 ENCOUNTER — OFFICE VISIT (OUTPATIENT)
Dept: PSYCHIATRY | Age: 63
End: 2023-01-09
Payer: MEDICAID

## 2023-01-09 VITALS
HEART RATE: 88 BPM | WEIGHT: 168.8 LBS | SYSTOLIC BLOOD PRESSURE: 110 MMHG | DIASTOLIC BLOOD PRESSURE: 78 MMHG | OXYGEN SATURATION: 93 % | BODY MASS INDEX: 27.25 KG/M2

## 2023-01-09 DIAGNOSIS — F33.1 MODERATE EPISODE OF RECURRENT MAJOR DEPRESSIVE DISORDER (HCC): ICD-10-CM

## 2023-01-09 PROCEDURE — 3017F COLORECTAL CA SCREEN DOC REV: CPT | Performed by: STUDENT IN AN ORGANIZED HEALTH CARE EDUCATION/TRAINING PROGRAM

## 2023-01-09 PROCEDURE — 1036F TOBACCO NON-USER: CPT | Performed by: STUDENT IN AN ORGANIZED HEALTH CARE EDUCATION/TRAINING PROGRAM

## 2023-01-09 PROCEDURE — 99214 OFFICE O/P EST MOD 30 MIN: CPT | Performed by: STUDENT IN AN ORGANIZED HEALTH CARE EDUCATION/TRAINING PROGRAM

## 2023-01-09 PROCEDURE — 3078F DIAST BP <80 MM HG: CPT | Performed by: STUDENT IN AN ORGANIZED HEALTH CARE EDUCATION/TRAINING PROGRAM

## 2023-01-09 PROCEDURE — G8482 FLU IMMUNIZE ORDER/ADMIN: HCPCS | Performed by: STUDENT IN AN ORGANIZED HEALTH CARE EDUCATION/TRAINING PROGRAM

## 2023-01-09 PROCEDURE — 3074F SYST BP LT 130 MM HG: CPT | Performed by: STUDENT IN AN ORGANIZED HEALTH CARE EDUCATION/TRAINING PROGRAM

## 2023-01-09 PROCEDURE — G8427 DOCREV CUR MEDS BY ELIG CLIN: HCPCS | Performed by: STUDENT IN AN ORGANIZED HEALTH CARE EDUCATION/TRAINING PROGRAM

## 2023-01-09 PROCEDURE — G8417 CALC BMI ABV UP PARAM F/U: HCPCS | Performed by: STUDENT IN AN ORGANIZED HEALTH CARE EDUCATION/TRAINING PROGRAM

## 2023-01-09 RX ORDER — TRAZODONE HYDROCHLORIDE 50 MG/1
75 TABLET ORAL NIGHTLY
Qty: 45 TABLET | Refills: 2 | Status: SHIPPED | OUTPATIENT
Start: 2023-01-09

## 2023-01-09 ASSESSMENT — ANXIETY QUESTIONNAIRES
3. WORRYING TOO MUCH ABOUT DIFFERENT THINGS: 1
2. NOT BEING ABLE TO STOP OR CONTROL WORRYING: 1
1. FEELING NERVOUS, ANXIOUS, OR ON EDGE: 2
5. BEING SO RESTLESS THAT IT IS HARD TO SIT STILL: 1
4. TROUBLE RELAXING: 2
IF YOU CHECKED OFF ANY PROBLEMS ON THIS QUESTIONNAIRE, HOW DIFFICULT HAVE THESE PROBLEMS MADE IT FOR YOU TO DO YOUR WORK, TAKE CARE OF THINGS AT HOME, OR GET ALONG WITH OTHER PEOPLE: SOMEWHAT DIFFICULT
7. FEELING AFRAID AS IF SOMETHING AWFUL MIGHT HAPPEN: 1
GAD7 TOTAL SCORE: 10
6. BECOMING EASILY ANNOYED OR IRRITABLE: 2

## 2023-01-09 ASSESSMENT — PATIENT HEALTH QUESTIONNAIRE - PHQ9
SUM OF ALL RESPONSES TO PHQ QUESTIONS 1-9: 13
9. THOUGHTS THAT YOU WOULD BE BETTER OFF DEAD, OR OF HURTING YOURSELF: 1
SUM OF ALL RESPONSES TO PHQ QUESTIONS 1-9: 13
7. TROUBLE CONCENTRATING ON THINGS, SUCH AS READING THE NEWSPAPER OR WATCHING TELEVISION: 1
3. TROUBLE FALLING OR STAYING ASLEEP: 2
8. MOVING OR SPEAKING SO SLOWLY THAT OTHER PEOPLE COULD HAVE NOTICED. OR THE OPPOSITE, BEING SO FIGETY OR RESTLESS THAT YOU HAVE BEEN MOVING AROUND A LOT MORE THAN USUAL: 1
SUM OF ALL RESPONSES TO PHQ9 QUESTIONS 1 & 2: 4
6. FEELING BAD ABOUT YOURSELF - OR THAT YOU ARE A FAILURE OR HAVE LET YOURSELF OR YOUR FAMILY DOWN: 1
2. FEELING DOWN, DEPRESSED OR HOPELESS: 2
SUM OF ALL RESPONSES TO PHQ QUESTIONS 1-9: 12
10. IF YOU CHECKED OFF ANY PROBLEMS, HOW DIFFICULT HAVE THESE PROBLEMS MADE IT FOR YOU TO DO YOUR WORK, TAKE CARE OF THINGS AT HOME, OR GET ALONG WITH OTHER PEOPLE: 1
5. POOR APPETITE OR OVEREATING: 1
1. LITTLE INTEREST OR PLEASURE IN DOING THINGS: 2
SUM OF ALL RESPONSES TO PHQ QUESTIONS 1-9: 13
4. FEELING TIRED OR HAVING LITTLE ENERGY: 2

## 2023-01-10 ASSESSMENT — ENCOUNTER SYMPTOMS
BACK PAIN: 1
RESPIRATORY NEGATIVE: 1
GASTROINTESTINAL NEGATIVE: 1
ALLERGIC/IMMUNOLOGIC NEGATIVE: 1
EYES NEGATIVE: 1

## 2023-01-16 DIAGNOSIS — E78.49 OTHER HYPERLIPIDEMIA: ICD-10-CM

## 2023-01-16 DIAGNOSIS — E11.8 TYPE 2 DIABETES MELLITUS WITH COMPLICATIONS (HCC): ICD-10-CM

## 2023-01-17 RX ORDER — ATORVASTATIN CALCIUM 40 MG/1
40 TABLET, FILM COATED ORAL DAILY
Qty: 90 TABLET | Refills: 1 | Status: SHIPPED | OUTPATIENT
Start: 2023-01-17

## 2023-01-18 ENCOUNTER — HOSPITAL ENCOUNTER (OUTPATIENT)
Dept: ULTRASOUND IMAGING | Age: 63
Discharge: HOME | End: 2023-01-18
Payer: MEDICAID

## 2023-01-18 ENCOUNTER — HOSPITAL ENCOUNTER (OUTPATIENT)
Dept: GENERAL RADIOLOGY | Age: 63
Discharge: HOME | End: 2023-01-18
Payer: MEDICAID

## 2023-01-18 DIAGNOSIS — Z85.6 HISTORY OF LEUKEMIA: ICD-10-CM

## 2023-01-18 DIAGNOSIS — E04.2 MULTIPLE THYROID NODULES: ICD-10-CM

## 2023-01-18 DIAGNOSIS — I10 PRIMARY HYPERTENSION: ICD-10-CM

## 2023-01-18 DIAGNOSIS — R22.1 NECK MASS: ICD-10-CM

## 2023-01-18 DIAGNOSIS — G47.33 OSA (OBSTRUCTIVE SLEEP APNEA): ICD-10-CM

## 2023-01-18 DIAGNOSIS — M79.641 RIGHT HAND PAIN: ICD-10-CM

## 2023-01-18 DIAGNOSIS — E66.3 OVERWEIGHT (BMI 25.0-29.9): ICD-10-CM

## 2023-01-18 DIAGNOSIS — E11.40 TYPE 2 DIABETES, CONTROLLED, WITH NEUROPATHY (HCC): ICD-10-CM

## 2023-01-18 DIAGNOSIS — E03.9 ACQUIRED HYPOTHYROIDISM: ICD-10-CM

## 2023-01-18 DIAGNOSIS — I25.10 CORONARY ARTERY DISEASE INVOLVING NATIVE CORONARY ARTERY OF NATIVE HEART WITHOUT ANGINA PECTORIS: ICD-10-CM

## 2023-01-18 DIAGNOSIS — E78.49 OTHER HYPERLIPIDEMIA: ICD-10-CM

## 2023-01-18 DIAGNOSIS — K21.9 GASTROESOPHAGEAL REFLUX DISEASE WITHOUT ESOPHAGITIS: ICD-10-CM

## 2023-01-18 PROCEDURE — 76536 US EXAM OF HEAD AND NECK: CPT

## 2023-01-18 PROCEDURE — 73130 X-RAY EXAM OF HAND: CPT

## 2023-01-19 LAB
A/G RATIO: 1.9 (ref 1.1–2.2)
ALBUMIN SERPL-MCNC: 4 G/DL (ref 3.4–5)
ALP BLD-CCNC: 80 U/L (ref 40–129)
ALT SERPL-CCNC: 26 U/L (ref 10–40)
ANION GAP SERPL CALCULATED.3IONS-SCNC: 12 MMOL/L (ref 3–16)
AST SERPL-CCNC: 22 U/L (ref 15–37)
BASOPHILS ABSOLUTE: 0 K/UL (ref 0–0.2)
BASOPHILS RELATIVE PERCENT: 0.7 %
BILIRUB SERPL-MCNC: 0.4 MG/DL (ref 0–1)
BUN BLDV-MCNC: 16 MG/DL (ref 7–20)
CALCIUM SERPL-MCNC: 9.1 MG/DL (ref 8.3–10.6)
CHLORIDE BLD-SCNC: 100 MMOL/L (ref 99–110)
CHOLESTEROL, FASTING: 191 MG/DL (ref 0–199)
CO2: 26 MMOL/L (ref 21–32)
CREAT SERPL-MCNC: 0.9 MG/DL (ref 0.6–1.2)
CREATININE URINE: 243.9 MG/DL (ref 28–259)
EOSINOPHILS ABSOLUTE: 0.1 K/UL (ref 0–0.6)
EOSINOPHILS RELATIVE PERCENT: 2 %
ESTIMATED AVERAGE GLUCOSE: 148.5 MG/DL
GFR SERPL CREATININE-BSD FRML MDRD: >60 ML/MIN/{1.73_M2}
GLUCOSE BLD-MCNC: 124 MG/DL (ref 70–99)
HBA1C MFR BLD: 6.8 %
HCT VFR BLD CALC: 39.8 % (ref 36–48)
HDLC SERPL-MCNC: 54 MG/DL (ref 40–60)
HEMOGLOBIN: 13.1 G/DL (ref 12–16)
LDL CHOLESTEROL CALCULATED: 103 MG/DL
LYMPHOCYTES ABSOLUTE: 1.1 K/UL (ref 1–5.1)
LYMPHOCYTES RELATIVE PERCENT: 33.5 %
MAGNESIUM: 1.3 MG/DL (ref 1.8–2.4)
MCH RBC QN AUTO: 28.6 PG (ref 26–34)
MCHC RBC AUTO-ENTMCNC: 33 G/DL (ref 31–36)
MCV RBC AUTO: 86.6 FL (ref 80–100)
MICROALBUMIN UR-MCNC: <1.2 MG/DL
MICROALBUMIN/CREAT UR-RTO: NORMAL MG/G (ref 0–30)
MONOCYTES ABSOLUTE: 0.3 K/UL (ref 0–1.3)
MONOCYTES RELATIVE PERCENT: 9.7 %
NEUTROPHILS ABSOLUTE: 1.8 K/UL (ref 1.7–7.7)
NEUTROPHILS RELATIVE PERCENT: 54.1 %
PDW BLD-RTO: 14.5 % (ref 12.4–15.4)
PLATELET # BLD: 200 K/UL (ref 135–450)
PMV BLD AUTO: 9.5 FL (ref 5–10.5)
POTASSIUM SERPL-SCNC: 4.5 MMOL/L (ref 3.5–5.1)
RBC # BLD: 4.6 M/UL (ref 4–5.2)
SODIUM BLD-SCNC: 138 MMOL/L (ref 136–145)
T3 FREE: 2.1 PG/ML (ref 2.3–4.2)
T4 FREE: 1.3 NG/DL (ref 0.9–1.8)
TOTAL PROTEIN: 6.1 G/DL (ref 6.4–8.2)
TRIGLYCERIDE, FASTING: 171 MG/DL (ref 0–150)
TSH SERPL DL<=0.05 MIU/L-ACNC: 1.5 UIU/ML (ref 0.27–4.2)
VLDLC SERPL CALC-MCNC: 34 MG/DL
WBC # BLD: 3.4 K/UL (ref 4–11)

## 2023-01-20 RX ORDER — LANOLIN ALCOHOL/MO/W.PET/CERES
400 CREAM (GRAM) TOPICAL 3 TIMES DAILY
Qty: 90 TABLET | Refills: 3 | Status: SHIPPED | OUTPATIENT
Start: 2023-01-20 | End: 2023-03-09

## 2023-01-25 ENCOUNTER — OFFICE VISIT (OUTPATIENT)
Dept: ENDOCRINOLOGY | Age: 63
End: 2023-01-25
Payer: MEDICAID

## 2023-01-25 VITALS
BODY MASS INDEX: 27 KG/M2 | RESPIRATION RATE: 14 BRPM | DIASTOLIC BLOOD PRESSURE: 68 MMHG | OXYGEN SATURATION: 99 % | SYSTOLIC BLOOD PRESSURE: 121 MMHG | HEART RATE: 87 BPM | TEMPERATURE: 98 F | WEIGHT: 168 LBS | HEIGHT: 66 IN

## 2023-01-25 DIAGNOSIS — I10 PRIMARY HYPERTENSION: ICD-10-CM

## 2023-01-25 DIAGNOSIS — E03.9 ACQUIRED HYPOTHYROIDISM: ICD-10-CM

## 2023-01-25 DIAGNOSIS — K75.81 NASH (NONALCOHOLIC STEATOHEPATITIS): ICD-10-CM

## 2023-01-25 DIAGNOSIS — I25.10 CORONARY ARTERY DISEASE INVOLVING NATIVE CORONARY ARTERY OF NATIVE HEART WITHOUT ANGINA PECTORIS: ICD-10-CM

## 2023-01-25 DIAGNOSIS — E11.40 TYPE 2 DIABETES, CONTROLLED, WITH NEUROPATHY (HCC): Primary | ICD-10-CM

## 2023-01-25 DIAGNOSIS — G47.33 OSA (OBSTRUCTIVE SLEEP APNEA): ICD-10-CM

## 2023-01-25 DIAGNOSIS — E78.49 OTHER HYPERLIPIDEMIA: ICD-10-CM

## 2023-01-25 DIAGNOSIS — E66.3 OVERWEIGHT (BMI 25.0-29.9): ICD-10-CM

## 2023-01-25 DIAGNOSIS — E04.2 MULTIPLE THYROID NODULES: ICD-10-CM

## 2023-01-25 DIAGNOSIS — R22.1 NECK MASS: ICD-10-CM

## 2023-01-25 PROCEDURE — 3017F COLORECTAL CA SCREEN DOC REV: CPT | Performed by: INTERNAL MEDICINE

## 2023-01-25 PROCEDURE — G8427 DOCREV CUR MEDS BY ELIG CLIN: HCPCS | Performed by: INTERNAL MEDICINE

## 2023-01-25 PROCEDURE — 3074F SYST BP LT 130 MM HG: CPT | Performed by: INTERNAL MEDICINE

## 2023-01-25 PROCEDURE — G8417 CALC BMI ABV UP PARAM F/U: HCPCS | Performed by: INTERNAL MEDICINE

## 2023-01-25 PROCEDURE — 3078F DIAST BP <80 MM HG: CPT | Performed by: INTERNAL MEDICINE

## 2023-01-25 PROCEDURE — G8482 FLU IMMUNIZE ORDER/ADMIN: HCPCS | Performed by: INTERNAL MEDICINE

## 2023-01-25 PROCEDURE — 1036F TOBACCO NON-USER: CPT | Performed by: INTERNAL MEDICINE

## 2023-01-25 PROCEDURE — 2022F DILAT RTA XM EVC RTNOPTHY: CPT | Performed by: INTERNAL MEDICINE

## 2023-01-25 PROCEDURE — 99214 OFFICE O/P EST MOD 30 MIN: CPT | Performed by: INTERNAL MEDICINE

## 2023-01-25 PROCEDURE — 3044F HG A1C LEVEL LT 7.0%: CPT | Performed by: INTERNAL MEDICINE

## 2023-01-25 RX ORDER — VIT C/B6/B5/MAGNESIUM/HERB 173 50-5-6-5MG
CAPSULE ORAL DAILY
COMMUNITY

## 2023-01-25 RX ORDER — INSULIN LISPRO 100 [IU]/ML
INJECTION, SOLUTION INTRAVENOUS; SUBCUTANEOUS
Qty: 10 ADJUSTABLE DOSE PRE-FILLED PEN SYRINGE | Refills: 3
Start: 2023-01-25

## 2023-01-25 RX ORDER — INSULIN GLARGINE 100 [IU]/ML
29 INJECTION, SOLUTION SUBCUTANEOUS NIGHTLY
Qty: 15 ML | Refills: 3
Start: 2023-01-25

## 2023-01-25 RX ORDER — GLUCOSAM/MSM/CHOND/HYALURON AC 750-60-150
TABLET ORAL
COMMUNITY

## 2023-01-25 NOTE — PROGRESS NOTES
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 data, I recommend:    1. Carbohydrate counting, portion control. 2.  Healthy food choices    3. Doing well overall    Return in about 6 months (around 7/25/2023) for diabetes.     Electronically signed by Glenn Angel MD on 1/25/2023 at 4:07 PM

## 2023-01-30 ENCOUNTER — TELEPHONE (OUTPATIENT)
Dept: ENDOCRINOLOGY | Age: 63
End: 2023-01-30

## 2023-01-30 DIAGNOSIS — E11.42 DIABETIC POLYNEUROPATHY ASSOCIATED WITH TYPE 2 DIABETES MELLITUS (HCC): Primary | ICD-10-CM

## 2023-01-30 NOTE — TELEPHONE ENCOUNTER
Called pt and advised to call her insurance company to see who is covered then we can place a referral, if needed.

## 2023-01-30 NOTE — TELEPHONE ENCOUNTER
Call from patient with podiatrist that is accepting NP Dr. Zofia Mullins requesting referral. Referral can be faxed to Fax# [phone]     Please advise

## 2023-01-30 NOTE — TELEPHONE ENCOUNTER
The patient has an order for diabetic foot exam.  She has ingrown toe nails. Who should she call?       Please advise patient at

## 2023-01-31 NOTE — TELEPHONE ENCOUNTER
I was not able to find him in directory. Printed genetic referral with physicians name and fax number in scheduling. Please fax as requested.

## 2023-02-02 NOTE — TELEPHONE ENCOUNTER
NOV- 07/26/23      Requested Prescriptions     Pending Prescriptions Disp Refills    Continuous Blood Gluc Sensor (FREESTYLE RAND 2 SENSOR) MISC 2 each 0     Sig: CHANGE SENSOR EVERY 14 DAYS

## 2023-02-03 RX ORDER — FLASH GLUCOSE SENSOR
KIT MISCELLANEOUS
Qty: 2 EACH | Refills: 0 | Status: SHIPPED | OUTPATIENT
Start: 2023-02-03

## 2023-02-08 ENCOUNTER — OFFICE VISIT (OUTPATIENT)
Dept: ENT CLINIC | Age: 63
End: 2023-02-08
Payer: MEDICAID

## 2023-02-08 VITALS
RESPIRATION RATE: 16 BRPM | HEIGHT: 66 IN | WEIGHT: 166 LBS | BODY MASS INDEX: 26.68 KG/M2 | SYSTOLIC BLOOD PRESSURE: 138 MMHG | HEART RATE: 71 BPM | DIASTOLIC BLOOD PRESSURE: 88 MMHG | OXYGEN SATURATION: 98 %

## 2023-02-08 DIAGNOSIS — J34.3 HYPERTROPHY OF BOTH INFERIOR NASAL TURBINATES: ICD-10-CM

## 2023-02-08 DIAGNOSIS — J34.89 NASAL OBSTRUCTION: Primary | ICD-10-CM

## 2023-02-08 DIAGNOSIS — J34.2 DEVIATED NASAL SEPTUM: ICD-10-CM

## 2023-02-08 DIAGNOSIS — G47.33 OSA (OBSTRUCTIVE SLEEP APNEA): ICD-10-CM

## 2023-02-08 DIAGNOSIS — E03.9 ACQUIRED HYPOTHYROIDISM: ICD-10-CM

## 2023-02-08 PROCEDURE — G8417 CALC BMI ABV UP PARAM F/U: HCPCS | Performed by: STUDENT IN AN ORGANIZED HEALTH CARE EDUCATION/TRAINING PROGRAM

## 2023-02-08 PROCEDURE — G8427 DOCREV CUR MEDS BY ELIG CLIN: HCPCS | Performed by: STUDENT IN AN ORGANIZED HEALTH CARE EDUCATION/TRAINING PROGRAM

## 2023-02-08 PROCEDURE — 3074F SYST BP LT 130 MM HG: CPT | Performed by: STUDENT IN AN ORGANIZED HEALTH CARE EDUCATION/TRAINING PROGRAM

## 2023-02-08 PROCEDURE — 3078F DIAST BP <80 MM HG: CPT | Performed by: STUDENT IN AN ORGANIZED HEALTH CARE EDUCATION/TRAINING PROGRAM

## 2023-02-08 PROCEDURE — 99244 OFF/OP CNSLTJ NEW/EST MOD 40: CPT | Performed by: STUDENT IN AN ORGANIZED HEALTH CARE EDUCATION/TRAINING PROGRAM

## 2023-02-08 PROCEDURE — G8482 FLU IMMUNIZE ORDER/ADMIN: HCPCS | Performed by: STUDENT IN AN ORGANIZED HEALTH CARE EDUCATION/TRAINING PROGRAM

## 2023-02-08 RX ORDER — FLUTICASONE PROPIONATE 50 MCG
2 SPRAY, SUSPENSION (ML) NASAL 2 TIMES DAILY
Qty: 2 EACH | Refills: 5 | Status: SHIPPED | OUTPATIENT
Start: 2023-02-08

## 2023-02-13 NOTE — PROGRESS NOTES
Sapna (:  1960) is a 60 y.o. female, here for evaluation of the following chief complaint(s): Mass (Neck ) and Sleep Apnea      ASSESSMENT/PLAN:  1. Nasal obstruction  2. Hypertrophy of both inferior nasal turbinates  3. Deviated nasal septum  4. Acquired hypothyroidism  5. CHRIS (obstructive sleep apnea)    This is a very pleasant 60 y.o. female here today for evaluation of the the above-noted complaints. We discussed that currently, her issues seem to be centered about CPAP mask. I recommend that she first start with 2 sprays of fluticasone combined with saline irrigations to see effect obstruction. We can reevaluate whether she would be a candidate for surgical procedures to open up her nasal airway as after she gets properly fitting CPAP mask. No evidence of palpable neck masses. Recent thyroid ultrasound was 2022. There was noted to be some tiny cystic nodules but nothing that required biopsy. She currently has an upcoming thyroid ultrasound. The mass that she is talking about is no longer there. Plan to monitor for now and get repeat thyroid ultrasound as scheduled. Medical Decision Making: The following items were considered in medical decision making:  Independent review of images  Review / order clinical lab tests  Review / order radiology tests  Decision to obtain old records  Review and summation of old records as accessed through [facility] if applicable    SUBJECTIVE/OBJECTIVE:  JIMMY    Erica Terry is here today for evaluation of neck mass and sleep apnea. The patient has acquired hypothyroidism. She initially had a goiter. Patient has some difficulty swallowing and intermittent voice changes. She has family history of people with thyroid problems including 2 of her aunts. She does not believe she has had history of thyroid cancer.   She has never had radiation to her neck.  Felt that there used to be a thyroid mass that was present, but this is now gone away. She is unsure what this was. No dysphagia or dysphonia. Due to her thyroid ultrasound ordered in September 2022. Normal appearing thyroid. Tiny subcentimeter nodule. Patient has a lot of fatigue. She CPAP. She has difficulty tolerating this. The face mask does not fit right and pushes down on her nose causing obstruction. She does not get frequent sinus infections. REVIEW OF SYSTEMS  The following systems were reviewed and revealed the following in addition to any already discussed in the HPI:    PHYSICAL EXAM    GENERAL: No acute distress, alert and oriented, no hoarseness, strong voice  EYES: EOMI, Anti-icteric  HENT:   Head: Normocephalic and atraumatic. Face:  Symmetric, facial nerve intact  Right Ear: Normal external ear, normal external auditory canal, intact tympanic membrane with normal mobility and aerated middle ear  Left Ear: Normal external ear, normal external auditory canal, intact tympanic membrane with normal mobility and aerated middle ear  Mouth/Oral Cavity:  normal lips, Uvula is midline, no mucosal lesions, no trismus, normal dentition, normal salivary quality/flow  Oropharynx/Larynx:  normal oropharynx, 1+ tonsils  Nose:Normal external nasal appearance. Anterior rhinoscopy shows  a deviated septum preventing view posteriorly. Hypertrophy of turbinates. Normal mucosa   NECK: Normal range of motion, no thyromegaly, trachea is midline, no lymphadenopathy, no neck masses, no crepitus  CHEST: Normal respiratory effort, no retractions, breathing comfortably  SKIN: No rashes, normal appearing skin, no evidence of skin lesions/tumors  Neuro:  cranial nerve II-XII intact; normal gait  Cardio:  no edema    No evidence of palpable neck masses    PROCEDURE      This note was generated completely or in part utilizing Dragon dictation speech recognition software.   Occasionally, words are mistranscribed and despite editing, the text may contain inaccuracies due to incorrect word recognition. If further clarification is needed please contact the office at [phone]. An electronic signature was used to authenticate this note.     --Yang Neff MD

## 2023-02-16 ENCOUNTER — TELEPHONE (OUTPATIENT)
Dept: INTERNAL MEDICINE CLINIC | Age: 63
End: 2023-02-16

## 2023-02-16 DIAGNOSIS — E11.8 TYPE 2 DIABETES MELLITUS WITH COMPLICATIONS (HCC): Primary | ICD-10-CM

## 2023-02-16 DIAGNOSIS — L60.0 INGROWN NAIL: ICD-10-CM

## 2023-02-20 DIAGNOSIS — E11.40 TYPE 2 DIABETES, CONTROLLED, WITH NEUROPATHY (HCC): ICD-10-CM

## 2023-02-20 RX ORDER — INSULIN LISPRO 100 [IU]/ML
INJECTION, SOLUTION INTRAVENOUS; SUBCUTANEOUS
Qty: 10 ADJUSTABLE DOSE PRE-FILLED PEN SYRINGE | Refills: 3 | OUTPATIENT
Start: 2023-02-20

## 2023-02-20 RX ORDER — INSULIN GLARGINE 100 [IU]/ML
29 INJECTION, SOLUTION SUBCUTANEOUS NIGHTLY
Qty: 15 ML | Refills: 3 | OUTPATIENT
Start: 2023-02-20

## 2023-02-22 ENCOUNTER — PROCEDURE VISIT (OUTPATIENT)
Dept: ENDOCRINOLOGY | Age: 63
End: 2023-02-22
Payer: MEDICAID

## 2023-02-22 ENCOUNTER — TELEPHONE (OUTPATIENT)
Dept: ENDOCRINOLOGY | Age: 63
End: 2023-02-22

## 2023-02-22 DIAGNOSIS — E11.40 POORLY CONTROLLED TYPE 2 DIABETES MELLITUS WITH NEUROPATHY (HCC): Primary | ICD-10-CM

## 2023-02-22 DIAGNOSIS — E11.65 POORLY CONTROLLED TYPE 2 DIABETES MELLITUS WITH NEUROPATHY (HCC): Primary | ICD-10-CM

## 2023-02-22 PROCEDURE — 95251 CONT GLUC MNTR ANALYSIS I&R: CPT | Performed by: INTERNAL MEDICINE

## 2023-02-22 NOTE — TELEPHONE ENCOUNTER
Spoke w/ pt. She is on [employer], we are connected. Please review attached download. Only new rx is fish oil, started it last Wednesday, has stopped it 2 days ago due to nausea. Pt wt down to 164 lb. Still doing 29units lantus at night, and SSI for humalog    At lunch she was 191 and took 10 units of insulin, she dropped afterwards.

## 2023-02-22 NOTE — TELEPHONE ENCOUNTER
I reviewed [employer], spoke with patient. I spoke with patient. We will temporarily stop metformin. Patient is experiencing hypoglycemia at night and during the day. Especially at night. She changed her diet significantly, started eating low-carb meals, she lost weight. Decrease Lantus to 22 units. Decrease Humalog by 2 units before meals. Notify me on 2/24/2023 how she is doing.

## 2023-02-23 NOTE — PROGRESS NOTES
CGMS Download Review and Recommendations  See scanned document for blood glucose tracing documentation  Freestyle Mela personal CGMS data downloaded and reviewed. Average glucose 123± 32.1 SD  Time in range: 89%  Time above 180: 10%  Time under 70: 1%   Glucose management indicator 6.3%    Basal pattern review: Basal normoglycemia and hypoglycemia  Postprandial pattern review: Postprandial normoglycemia  Hypoglycemia review: Hypoglycemia after hyperglycemia, hypoglycemia at night  Activity related review: Not recorded      Based on the data, I recommend:    1. Decrease Lantus to 22 units. 2.  Decrease Humalog by 2 units before meals. 3.  Stop metformin. 4.  Close monitoring. 5.  Hypoglycemia management    These recommendations were discussed with patient in details.     Renetta Arciniega MD

## 2023-02-24 DIAGNOSIS — E11.40 TYPE 2 DIABETES, CONTROLLED, WITH NEUROPATHY (HCC): ICD-10-CM

## 2023-02-24 RX ORDER — INSULIN GLARGINE 100 [IU]/ML
INJECTION, SOLUTION SUBCUTANEOUS
Qty: 15 ML | Refills: 3 | Status: SHIPPED | OUTPATIENT
Start: 2023-02-24 | End: 2023-02-26

## 2023-02-26 DIAGNOSIS — E11.40 TYPE 2 DIABETES, CONTROLLED, WITH NEUROPATHY (HCC): ICD-10-CM

## 2023-02-26 RX ORDER — INSULIN GLARGINE 100 [IU]/ML
INJECTION, SOLUTION SUBCUTANEOUS
Qty: 15 ML | Refills: 3
Start: 2023-02-26

## 2023-02-27 NOTE — TELEPHONE ENCOUNTER
I spoke with the patient. I reviewed CGM download. Improved blood glucose levels. After metformin was discontinued and Lantus dose and Humalog dose decreased, glucose range mostly . Lantus dose 22 units at night. Current Humalog dose 7 units for breakfast, 6 units for lunch 7 units for dinner. Despite changes, patient still feels very weak. She will discuss that with family doctor as well. Advised her to do a trial stopping Jardiance and see if it makes her feel better. Also advised to drink plenty of fluids make sure she stays hydrated. Magnesium tends to be low, on replacement. Potassium stays okay. Continue monitoring.

## 2023-03-01 ENCOUNTER — TELEPHONE (OUTPATIENT)
Dept: ENDOCRINOLOGY | Age: 63
End: 2023-03-01

## 2023-03-01 DIAGNOSIS — E11.40 TYPE 2 DIABETES, CONTROLLED, WITH NEUROPATHY (HCC): ICD-10-CM

## 2023-03-01 RX ORDER — INSULIN LISPRO 100 [IU]/ML
INJECTION, SOLUTION INTRAVENOUS; SUBCUTANEOUS
Qty: 10 ADJUSTABLE DOSE PRE-FILLED PEN SYRINGE | Refills: 1 | Status: SHIPPED | OUTPATIENT
Start: 2023-03-01

## 2023-03-01 RX ORDER — FLASH GLUCOSE SENSOR
KIT MISCELLANEOUS
Qty: 6 EACH | Refills: 0 | Status: SHIPPED | OUTPATIENT
Start: 2023-03-01

## 2023-03-01 NOTE — TELEPHONE ENCOUNTER
Patient called requesting a refill     Rx- insulin lispro, 1 Unit Dial, (HUMALOG KWIKPEN) 100 UNIT/ML     Continuous Blood Gluc Sensor (FREESTYLE RAND 2 SENSOR)      [facility]     LOV- 1/25/23  NOV- 7/26/23    Please advise

## 2023-03-07 ASSESSMENT — ENCOUNTER SYMPTOMS
COLOR CHANGE: 0
EYE DISCHARGE: 0
VOMITING: 0
ABDOMINAL PAIN: 0
WHEEZING: 0
SHORTNESS OF BREATH: 0
BLOOD IN STOOL: 0
FACIAL SWELLING: 0
ABDOMINAL DISTENTION: 0
CHEST TIGHTNESS: 0
BACK PAIN: 0
COUGH: 0

## 2023-03-07 NOTE — PROGRESS NOTES
[address] [place]  [phone]          Progress Note    Patient Name:  Masha Hurt    :  1960 10:31 AM      SUBJECTIVE       Mr. Seymour Mccormick  has no chest pain, shortness of breath, palpitations, nausea or vomiting    He is status post echocardiogram and stress test this morning which results are pending. OBJECTIVE     Vital signs:    BP (!) 151/85   Pulse 92   Temp 98.2 °F (36.8 °C) (Oral)   Resp 16   Ht 5' 5\" (1.651 m)   Wt 261 lb 0.4 oz (118.4 kg)   SpO2 98%   BMI 43.44 kg/m²     . tro    Admit Weight:  275 lb (124.7 kg)    Last 3 weights: Wt Readings from Last 3 Encounters:   21 261 lb 0.4 oz (118.4 kg)   20 272 lb (123.4 kg)   20 268 lb (121.6 kg)       BMI: Body mass index is 43.44 kg/m². Input/Output:       Intake/Output Summary (Last 24 hours) at 2021 1031  Last data filed at 2021 0740  Gross per 24 hour   Intake 1561 ml   Output 2600 ml   Net -1039 ml       Date 21 0000 - 21 2359   Shift 0384-2678 7157-2713 [id number] 24 Hour Total   INTAKE   Shift Total(mL/kg)       OUTPUT   Urine(mL/kg/hr) 300(0.3)   300   Shift Total(mL/kg) 300(2.5)   300(2.5)   Weight (kg) 118.4 118.4 118.4 118.4     Exam:     General appearance: awake and alert moves all ext   Lungs: no rhonchi, no wheezes, no rales  Heart: S1 and S2 no murmur  Abdomen: positive bowel sounds, no bruits, no masses  Extremities: warm and dry, no cyanosis, no clubbing        Laboratory Studies:     CBC:   Recent Labs     21  0350 21  0529   WBC 7.4 12.6*   HGB 12.7* 12.4*   HCT 37.5* 36.9*   MCV 86.5 87.2    251     BMP:   Recent Labs     21  0350 21  0529    137   K 4.1 4.5    100   CO2 24 25   BUN 21 28*   CREATININE 0.87 1.10     PT/INR: No results for input(s): PROTIME, INR in the last 72 hours. APTT: No results for input(s): APTT in the last 72 hours.   MAG:   Recent Labs [facility]     Outpatient Cardiology         Patient Name:  Ana Lilia Dewitt  Requesting Physician: No admitting provider for patient encounter. Primary Care Physician: Med Desir MD    Reason for Consultation/Chief Complaint:   Chief Complaint   Patient presents with    Coronary Artery Disease    Hyperlipidemia    Shortness of Breath       History of Present Illness:    hospitals     [facility] a 60 y.o. female with PMH of CAD, S/p SAMARIA x 3 to LAD on 6/2/19 complicated with RP bleed after,  HLD, sleep apnea, DM II, HTN, PSVT, SAH, CORONA. Here for follow up for CAD/HLD/HTN/PSVT  CAD, normal stress test, no angina. Continue Lipitor, LDL not at goal.  Asymptomatic. HLD, on Lipitor 40, no side effects, LDL not at goal  HTN, controlled on current medications, taking only metoprolol  PSVT, symptomatic PACs  Previously Leqvio was prescribed on the issues with the infusion center. Having occasional episodes of shortness of breath. We will check a BNP    PMH  Past Medical History:   Diagnosis Date    Anxiety and depression     Asthma     AV block, 1st degree     B12 deficiency     Balance disorder     frequent falls    CAD (coronary artery disease)     Chronic ischemic heart disease     sp stents-was at [facility]-dr davalos?     CVA (cerebral vascular accident) [facility])     2003    Diabetic polyneuropathy associated with type 2 diabetes mellitus ([address]     Diabetic retinopathy ([address]     Essential (primary) hypertension     GERD (gastroesophageal reflux disease)     H/O heart artery stent     X3    H/O total hysterectomy     Hyperparathyroidism ([address]     Hypothyroidism     Leukemia ([address]     acute lymphoblastic leukemia- [facility]    Low back pain     Mild intermittent asthma without complication     cold induced    Muscle spasms of neck     CORONA (nonalcoholic steatohepatitis)     saw dr Ar Chatman    CHRIS (obstructive sleep apnea)     Osteoarthritis     hands knees, hips    Osteopenia 2021    dexa 21  0350   MG 2.1     D Dimer: No results for input(s): DDIMER in the last 72 hours. Troponin    Recent Labs     21  1100 21  1805 21  0703   TROPHS 7 8 7                BNP No results for input(s): BNP in the last 72 hours. Recent Labs     21  0350   PROBNP 228         Pulse Ox: SpO2  Av.9 %  Min: 95 %  Max: 99 %  Supplemental O2:       Current Meds:    insulin lispro  0-12 Units Subcutaneous TID WC    insulin lispro  0-6 Units Subcutaneous Nightly    potassium chloride  20 mEq Oral Daily with breakfast    sodium chloride flush  10 mL Intravenous 2 times per day    famotidine  20 mg Oral BID    ipratropium-albuterol  1 ampule Inhalation Q4H WA    predniSONE  40 mg Oral Daily    azithromycin  500 mg Intravenous Q24H    aspirin  81 mg Oral Daily    naproxen  500 mg Oral BID    metoprolol tartrate  50 mg Oral BID    ranolazine  500 mg Oral BID    atorvastatin  20 mg Oral Nightly    spironolactone  25 mg Oral Nightly    furosemide  40 mg Oral Daily    lisinopril  30 mg Oral Daily    enoxaparin  30 mg Subcutaneous BID     Continuous Infusions:    sodium chloride      dextrose         Echo:      ASSESSMENT     Active Problems:    COPD exacerbation (HCC)  Resolved Problems:    * No resolved hospital problems. *  Chest pain. PLAN   Await stress test and echocardiogram.  If negative, plan discharge. If a significant amount of ischemia is noted then would recommend invasive evaluation.         Electronically signed by Sandip Dee DO on 2021 at 10:31 AM Other hyperlipidemia     PSVT (paroxysmal supraventricular tachycardia) (HCC)     Restless leg syndrome     Sleep apnea     mild    Subarachnoid hemorrhage ([facility] 2003    Thyroid nodule 09/2022    fu 6m    Type 2 diabetes mellitus with complications ([facility]        PSH  Past Surgical History:   Procedure Laterality Date    ABDOMINAL EXPLORATION SURGERY      CHOLECYSTECTOMY, LAPAROSCOPIC      COLONOSCOPY N/A 05/27/2022    fu 10 yrs, COLONOSCOPY performed by Akila Buckner MD at [address] ([address] [facility])  1986    LIVER BIOPSY      OTHER SURGICAL HISTORY Right     radial head fx    MALLORY AND BSO (CERVIX REMOVED)      2 surgeries, 1985, 2001    UPPER GASTROINTESTINAL ENDOSCOPY N/A 05/27/2022    EGD BIOPSY performed [facility]  Social History     Tobacco Use    Smoking status: Never     Passive exposure: Past    Smokeless tobacco: Never   Vaping Use    Vaping Use: Never used   Substance Use Topics    Alcohol use: Never    Drug use: Never       Family History  Family History   Problem Relation Age of Onset    Sleep Apnea Mother     Diabetes type 2  Mother     Asthma Mother     Heart Failure Mother     COPD Mother     Diabetes type 2  Father     Heart Disease Father     Lung Cancer Father     Heart Surgery Father     Other Brother         factor 5 leiden    Cancer Maternal Aunt     Thyroid Disease Maternal Aunt     Diabetes type 2  Paternal Aunt     Cancer Paternal Aunt     Diabetes type 2  Paternal Grandmother     Mental Illness Paternal Grandmother         hospitalized with anger issues    Depression Daughter     Anxiety Disorder Daughter     Suicide Nephew         hanged self in 2012       Allergies   Allergies   Allergen Reactions    Iodides Other (See Comments) and Shortness Of Breath     SNEEZING  SNEEZING      Nitrofurantoin Anaphylaxis, Hives, Itching, Shortness Of Breath and Swelling    Nitrofurantoin Macrocrystal Anaphylaxis Cisapride Diarrhea    Fexofenadine-Pseudoephed Er Palpitations     HEART RACING      Metoclopramide Diarrhea and Hives    Codeine Dizziness or Vertigo and Other (See Comments)     HALLUCINATE      Oxycodone Hallucinations    Oxycodone-Acetaminophen Other (See Comments)     HALLUCINATE     Sulfamethoxazole-Trimethoprim Hives and Other (See Comments)     Surpresses bone marrow      Ciprofloxacin Hives    Clarithromycin Hives       Medications:     Home Medications:  Were reviewed and are listed in nursing record. and/or listed below    Prior to Admission medications    Medication Sig Start Date End Date Taking?  Authorizing Provider   DULoxetine (CYMBALTA) 30 MG extended release capsule Take 1 capsule by mouth daily Take with the 60mg capsule for a total of 90mg 3/9/23  Yes Tristan Diaz MD   traZODone (DESYREL) 50 MG tablet Take 1.5 tablets by mouth nightly 3/9/23  Yes Tristan Diaz MD   Continuous Blood Gluc Sensor (FREESTYLE RAND 2 SENSOR) MISC CHANGE SENSOR EVERY 14 DAYS 3/6/23  Yes Orlando Humphreys MD   insulin lispro, 1 Unit Dial, (HUMALOG KWIKPEN) 100 UNIT/ML SOPN Use up  To 35 units daily per sliding scale 3/6/23  Yes Orlando Humphreys MD   insulin glargine (LANTUS SOLOSTAR) 100 UNIT/ML injection pen INJECT 22 UNITS UNDER THE SKIN NIGHTLY 2/26/23  Yes Orlando Humphreys MD   fluticasone (FLONASE) 50 MCG/ACT nasal spray 2 sprays by Nasal route 2 times daily One month supply equals 2 bottles 2/8/23  Yes Yang Neff MD   vitamin D 25 MCG (1000 UT) CAPS    Yes Historical Provider, MD   Glucos-Chondroit-Hyaluron-MSM (GLUCOSAMINE CHONDROITIN JOINT) TABS Take by mouth   Yes Historical Provider, MD   turmeric 500 MG CAPS Take by mouth daily   Yes Historical Provider, MD   magnesium oxide (MAG-OX) 400 (240 Mg) MG tablet Take 1 tablet by mouth 3 times daily 1/20/23 3/9/23 Yes Zeke Miller MD   atorvastatin (LIPITOR) 40 MG tablet Take 1 tablet by mouth daily 1/17/23  Yes Zeke Miller MD pantoprazole (PROTONIX) 40 MG tablet Take 1 tablet by mouth daily 12/20/22  Yes Ted Foote MD   DULoxetine (CYMBALTA) 60 MG extended release capsule Take 1 capsule by mouth daily 12/20/22  Yes Ted Foote MD   metoprolol succinate (TOPROL XL) 25 MG extended release tablet Take 1 tablet by mouth daily 11/4/22  Yes De Santiago Frederick, APRN - CNP   methocarbamol (ROBAXIN) 500 MG tablet  9/11/22  Yes Historical Provider, MD   levothyroxine (SYNTHROID) 75 MCG tablet Take 1 tablet by mouth Six times weekly 9/28/22  Yes Masood Ward MD   furosemide (LASIX) 20 MG tablet TAKE ONE TABLET DAILY AS NEEDED FOR EDEMA 8/9/22  Yes Livier Taylor MD   Blood Glucose Monitoring Suppl (ONE TOUCH ULTRA 2) w/Device KIT 1 kit by Does not apply route daily 6/15/22  Yes Ted Foote MD   Lancets MISC 1 each by Does not apply route 2 times daily 6/15/22  Yes Ted Foote MD   blood glucose monitor strips Test 2 times a day & as needed for symptoms of irregular blood glucose 6/15/22  Yes Ted Foote MD   Insulin Pen Needle 32G X 5 MM MISC 1 each by Other route 5 times daily 5/23/22  Yes Ted Foote MD   aspirin 81 MG EC tablet Take 81 mg by mouth daily   Yes Historical Provider, MD   albuterol sulfate  (90 Base) MCG/ACT inhaler Inhale 2 puffs into the lungs every 6 hours as needed for Wheezing 4/6/22  Yes Ted Foote MD   cetirizine (ZYRTEC ALLERGY) 10 MG tablet Take 1 tablet by mouth daily 4/6/22  Yes Ted Foote MD   empagliflozin (JARDIANCE) 25 MG tablet Take 1 tablet by mouth daily  Patient not taking: Reported on 3/9/2023 11/4/22   Ted Foote MD   Inclisiran Sodium (LEQVIO) 284 MG/1.5ML Inject 1.5 mLs into the skin every 6 months  Patient not taking: Reported on 3/9/2023 9/8/22   Livire Taylor MD        Review of Systems   Constitutional:  Negative for activity change, appetite change, diaphoresis, fatigue, fever and unexpected weight change. HENT:  Negative for congestion, facial swelling, mouth sores and nosebleeds. Eyes:  Negative for discharge and visual disturbance. Respiratory:  Negative for cough, chest tightness, shortness of breath and wheezing. Cardiovascular:  Negative for chest pain, palpitations and leg swelling. Gastrointestinal:  Negative for abdominal distention, abdominal pain, blood in stool and vomiting. Endocrine: Negative for cold intolerance, heat intolerance and polyuria. Genitourinary:  Negative for difficulty urinating, dysuria, frequency and hematuria. Musculoskeletal:  Negative for back pain, joint swelling, myalgias and neck pain. Skin:  Negative for color change, pallor and rash. Allergic/Immunologic: Negative for immunocompromised state. Neurological:  Negative for dizziness, syncope, weakness, light-headedness, numbness and headaches. Hematological:  Negative for adenopathy. Does not bruise/bleed easily. Psychiatric/Behavioral:  Negative for behavioral problems, confusion, decreased concentration and suicidal ideas. The patient is not nervous/anxious. Vitals:    03/09/23 1412   BP: 130/70   Pulse: 72   SpO2: 98%    Weight: 170 lb 6.4 oz (77.3 kg)       Vitals:    03/09/23 1412   BP: 130/70   Site: Left Upper Arm   Position: Sitting   Cuff Size: Medium Adult   Pulse: 72   SpO2: 98%   Weight: 170 lb 6.4 oz (77.3 kg)       BP Readings from Last 3 Encounters:   03/09/23 130/70   03/09/23 112/68   02/08/23 138/88       Wt Readings from Last 3 Encounters:   03/09/23 170 lb 6.4 oz (77.3 kg)   03/09/23 167 lb 6.4 oz (75.9 kg)   02/08/23 166 lb (75.3 kg)       Physical Exam  Constitutional:       General: She is not in acute distress. Appearance: She is well-developed. She is not diaphoretic. HENT:      Head: Normocephalic and atraumatic. Eyes:      Pupils: Pupils are equal, round, and reactive to light.    Neck:      Thyroid: No thyromegaly. Vascular: No JVD. Cardiovascular:      Rate and Rhythm: Normal rate and regular rhythm. Chest Wall: PMI is not displaced. Heart sounds: Normal heart sounds, S1 normal and S2 normal. No murmur heard. No friction rub. No gallop. Pulmonary:      Effort: Pulmonary effort is normal. No respiratory distress. Breath sounds: Normal breath sounds. No stridor. No wheezing or rales. Chest:      Chest wall: No tenderness. Abdominal:      General: Bowel sounds are normal. There is no distension. Palpations: Abdomen is soft. Tenderness: There is no abdominal tenderness. There is no guarding or rebound. Musculoskeletal:         General: No tenderness. Normal range of motion. Cervical back: Normal range of motion. Lymphadenopathy:      Cervical: No cervical adenopathy. Skin:     General: Skin is warm and dry. Findings: No erythema or rash. Neurological:      Mental Status: She is alert and oriented to person, place, and time. Coordination: Coordination normal.   Psychiatric:         Behavior: Behavior normal.         Thought Content:  Thought content normal.         Judgment: Judgment normal.       Labs:       Lab Results   Component Value Date    WBC 3.4 (L) 01/18/2023    HGB 13.1 01/18/2023    HCT 39.8 01/18/2023    MCV 86.6 01/18/2023     01/18/2023     Lab Results   Component Value Date     01/18/2023    K 4.5 01/18/2023     01/18/2023    CO2 26 01/18/2023    BUN 16 01/18/2023    CREATININE 0.9 01/18/2023    GLUCOSE 124 (H) 01/18/2023    CALCIUM 9.1 01/18/2023    PROT 6.1 (L) 01/18/2023    LABALBU 4.0 01/18/2023    BILITOT 0.4 01/18/2023    ALKPHOS 80 01/18/2023    AST 22 01/18/2023    ALT 26 01/18/2023    LABGLOM >60 01/18/2023    GFRAA >60 08/30/2022    AGRATIO 1.9 01/18/2023         Lab Results   Component Value Date    CHOL 194 08/30/2022    CHOL 168 04/06/2022     Lab Results   Component Value Date    TRIG 146 08/30/2022    TRIG 158 (H) 04/06/2022     Lab Results   Component Value Date    HDL 54 01/18/2023    HDL 58 08/30/2022    HDL 57 04/06/2022     Lab Results   Component Value Date    LDLCALC 103 (H) 01/18/2023    LDLCALC 107 (H) 08/30/2022    LDLCALC 79 04/06/2022     Lab Results   Component Value Date    LABVLDL 34 01/18/2023    LABVLDL 29 08/30/2022    LABVLDL 32 04/06/2022     No results found for: CHOLHDLRATIO    No results found for: INR, PROTIME    The 10-year ASCVD risk score (Desmond SHEFFIELD, et al., 2019) is: 11.5%    Values used to calculate the score:      Age: 60 years      Sex: Female      Is Non- : No      Diabetic: Yes      Tobacco smoker: No      Systolic Blood Pressure: 237 mmHg      Is BP treated: Yes      HDL Cholesterol: 54 mg/dL      Total Cholesterol: 191 mg/dL      Imaging:       Last ECG (if available, Personally interpreted):    Last Monitor/Holter (if available):    Last Stress (if available): 8/17/22  Summary    There is normal isotope uptake at stress and rest. There is no evidence of    myocardial ischemia or scar. Normal LV size and systolic function. Left ventricular ejection fraction of 85 %. There are no regional wall motion abnormalities. Overall findings represent a low risk scan. 6/10/21  IMPRESSIONS    Normal perfusion images with no evidence of ischemia    Hyperdynamic LV function     Last Cath (if available): 3/8/19  Result Date: 3/8/2019  · 70% stenosis of the mid LAD and 60% of proximal LAD. FFR was 0.59 with significant stenosis of the proximal segment on pullback so both lesions were stented. · 1 SAMARIA placed in the proximal LAD · 2 SAMARIA placed in the mid to distal LAD with good results. Plan: 1. Ticagrelor daily for a minimum of 12 months 2. Daily aspirin indefinitely 3. Standard secondary preventative medical therapy (statin, beta blocker, ACEI if needed) 4.  Start Imdur for anti-anginal management     Cardiac Procedure    Result Date: 3/8/2019  · Proximal LAD 30-40%, mid 30-40%, distal 40-50% · Cx 20-30% · Proximal RCA 20%. · The ejection fraction is greater than 55% by visual estimate. Plan FFR of LAD     Last TTE/WILLIAM(if available): 5/13/22   Summary   Normal left ventricle size, wall thickness, and systolic function with an   estimated ejection fraction of 60%-65%. No regional wall motion abnormalities are seen. Indeterminate diastolic function. Trivial mitral regurgitation. Mildly calcified aortic valve. The pulmonic valve is not well visualized. Last CMR  (if available):    Last Coronary Artery Calcium Score: Ankle-brachial index:    Carotid ultrasound screening:    Abdominal aortic aneurysm screening:       Assessment / Plan:     Other hyperlipidemia  LDL not at goal.  On max dose Lipitor. She does have coronary disease with prior PCI's. Needs to be on Leqvio    Chronic ischemic heart disease  Doing well. No symptoms concerning for angina. Continue Lipitor    Primary hypertension  Well-controlled on metoprolol    Given occasional episodes of shortness of breath plan to check a BNP. If elevated restart Jardiance and try Lasix a few times a week    Follow up in 6 months. I had the opportunity to review the clinical symptoms and presentation of Malka Burnham. Patient's allergies and medications were reviewed and updated. Patient's past medical, surgical, social and family history were reviewed and updated. Patient's testing including laboratory, ECGs, monitor, imaging (TTE,WILLIAM,CMR,cath) were reviewed. All questions and concerns were addressed to the patient/family. Alternatives to my treatment were discussed. The note was completed using EMR. Every effort wasmade to ensure accuracy; however, inadvertent computerized transcription errors may be present. Thank you for allowing me to participate in lucero or Lu Arreola MD, [facility], [facility] [address]

## 2023-03-08 NOTE — PROGRESS NOTES
patient continues to note difficulties with concentration and cognitive issues, though she's ok with these at present and does not wish for intervention regarding such. The patient denied any SI/HI/AVH on evaluation. ROS:  Review of Systems   Constitutional: Negative. HENT: Negative. Eyes: Negative. Respiratory: Negative. Cardiovascular: Negative. Gastrointestinal: Negative. Endocrine: Negative. Genitourinary: Negative. Musculoskeletal: Negative. Skin: Negative. Allergic/Immunologic: Negative. Neurological: Negative. Hematological: Negative. Psychiatric/Behavioral:  Positive for confusion and dysphoric mood. The patient is nervous/anxious. Brief Medical Hx:   Patient Active Problem List   Diagnosis    Chronic ischemic heart disease    Diabetic polyneuropathy associated with type 2 diabetes mellitus (HCC)    GERD (gastroesophageal reflux disease)    Leukemia (HCC)    Hypothyroidism    CORONA (nonalcoholic steatohepatitis)    Other hyperlipidemia    Type 2 diabetes mellitus with complications (HCC)    Restless leg syndrome    Mild intermittent asthma without complication    Balance disorder    CHRIS (obstructive sleep apnea)    Coronary artery disease involving native coronary artery of native heart without angina pectoris    Primary hypertension    Palpitations    Moderate episode of recurrent major depressive disorder (HCC)    KARIE (generalized anxiety disorder)    Type 2 diabetes mellitus, uncontrolled, with neuropathy    Overweight (BMI 25.0-29. 9)    Multiple thyroid nodules    Neck mass    Type 2 diabetes, controlled, with neuropathy (HCC)        Brief Psych Hx:  Hosp: Denied  Diagnoses: Depression and anxiety  Med trials: Amitriptyline for years, nortriptyline  Outpt: Previously seen multiple therapists but no psychiatrist  NSSI: Denied  Suicide Attempts: Denied    O:  Wt Readings from Last 3 Encounters:   03/09/23 170 lb 6.4 oz (77.3 kg)   03/09/23 167 lb 6.4 oz

## 2023-03-09 ENCOUNTER — OFFICE VISIT (OUTPATIENT)
Dept: PSYCHIATRY | Age: 63
End: 2023-03-09
Payer: MEDICAID

## 2023-03-09 ENCOUNTER — OFFICE VISIT (OUTPATIENT)
Dept: CARDIOLOGY CLINIC | Age: 63
End: 2023-03-09

## 2023-03-09 VITALS
HEART RATE: 74 BPM | DIASTOLIC BLOOD PRESSURE: 68 MMHG | BODY MASS INDEX: 27.02 KG/M2 | OXYGEN SATURATION: 96 % | SYSTOLIC BLOOD PRESSURE: 112 MMHG | WEIGHT: 167.4 LBS

## 2023-03-09 VITALS
SYSTOLIC BLOOD PRESSURE: 130 MMHG | OXYGEN SATURATION: 98 % | DIASTOLIC BLOOD PRESSURE: 70 MMHG | BODY MASS INDEX: 27.5 KG/M2 | HEART RATE: 72 BPM | WEIGHT: 170.4 LBS

## 2023-03-09 DIAGNOSIS — F33.1 MODERATE EPISODE OF RECURRENT MAJOR DEPRESSIVE DISORDER (HCC): Primary | ICD-10-CM

## 2023-03-09 DIAGNOSIS — H91.90 HEARING LOSS, UNSPECIFIED HEARING LOSS TYPE, UNSPECIFIED LATERALITY: Primary | ICD-10-CM

## 2023-03-09 DIAGNOSIS — F41.1 GAD (GENERALIZED ANXIETY DISORDER): ICD-10-CM

## 2023-03-09 DIAGNOSIS — I10 PRIMARY HYPERTENSION: ICD-10-CM

## 2023-03-09 DIAGNOSIS — E78.49 OTHER HYPERLIPIDEMIA: ICD-10-CM

## 2023-03-09 DIAGNOSIS — I25.9 CHRONIC ISCHEMIC HEART DISEASE: ICD-10-CM

## 2023-03-09 DIAGNOSIS — R07.9 CHEST PAIN, UNSPECIFIED TYPE: Primary | ICD-10-CM

## 2023-03-09 PROCEDURE — 3078F DIAST BP <80 MM HG: CPT | Performed by: STUDENT IN AN ORGANIZED HEALTH CARE EDUCATION/TRAINING PROGRAM

## 2023-03-09 PROCEDURE — 1036F TOBACCO NON-USER: CPT | Performed by: STUDENT IN AN ORGANIZED HEALTH CARE EDUCATION/TRAINING PROGRAM

## 2023-03-09 PROCEDURE — 3017F COLORECTAL CA SCREEN DOC REV: CPT | Performed by: STUDENT IN AN ORGANIZED HEALTH CARE EDUCATION/TRAINING PROGRAM

## 2023-03-09 PROCEDURE — G8417 CALC BMI ABV UP PARAM F/U: HCPCS | Performed by: STUDENT IN AN ORGANIZED HEALTH CARE EDUCATION/TRAINING PROGRAM

## 2023-03-09 PROCEDURE — 3074F SYST BP LT 130 MM HG: CPT | Performed by: STUDENT IN AN ORGANIZED HEALTH CARE EDUCATION/TRAINING PROGRAM

## 2023-03-09 PROCEDURE — G8427 DOCREV CUR MEDS BY ELIG CLIN: HCPCS | Performed by: STUDENT IN AN ORGANIZED HEALTH CARE EDUCATION/TRAINING PROGRAM

## 2023-03-09 PROCEDURE — G8482 FLU IMMUNIZE ORDER/ADMIN: HCPCS | Performed by: STUDENT IN AN ORGANIZED HEALTH CARE EDUCATION/TRAINING PROGRAM

## 2023-03-09 PROCEDURE — 99214 OFFICE O/P EST MOD 30 MIN: CPT | Performed by: STUDENT IN AN ORGANIZED HEALTH CARE EDUCATION/TRAINING PROGRAM

## 2023-03-09 RX ORDER — DULOXETIN HYDROCHLORIDE 30 MG/1
30 CAPSULE, DELAYED RELEASE ORAL DAILY
Qty: 90 CAPSULE | Refills: 1 | Status: SHIPPED | OUTPATIENT
Start: 2023-03-09

## 2023-03-09 RX ORDER — TRAZODONE HYDROCHLORIDE 50 MG/1
75 TABLET ORAL NIGHTLY
Qty: 45 TABLET | Refills: 2 | Status: SHIPPED | OUTPATIENT
Start: 2023-03-09

## 2023-03-09 ASSESSMENT — ANXIETY QUESTIONNAIRES
5. BEING SO RESTLESS THAT IT IS HARD TO SIT STILL: 1
IF YOU CHECKED OFF ANY PROBLEMS ON THIS QUESTIONNAIRE, HOW DIFFICULT HAVE THESE PROBLEMS MADE IT FOR YOU TO DO YOUR WORK, TAKE CARE OF THINGS AT HOME, OR GET ALONG WITH OTHER PEOPLE: SOMEWHAT DIFFICULT
1. FEELING NERVOUS, ANXIOUS, OR ON EDGE: 1
6. BECOMING EASILY ANNOYED OR IRRITABLE: 1
7. FEELING AFRAID AS IF SOMETHING AWFUL MIGHT HAPPEN: 1
4. TROUBLE RELAXING: 1
2. NOT BEING ABLE TO STOP OR CONTROL WORRYING: 1
GAD7 TOTAL SCORE: 7
3. WORRYING TOO MUCH ABOUT DIFFERENT THINGS: 1

## 2023-03-09 ASSESSMENT — PATIENT HEALTH QUESTIONNAIRE - PHQ9
SUM OF ALL RESPONSES TO PHQ QUESTIONS 1-9: 10
3. TROUBLE FALLING OR STAYING ASLEEP: 1
7. TROUBLE CONCENTRATING ON THINGS, SUCH AS READING THE NEWSPAPER OR WATCHING TELEVISION: 1
2. FEELING DOWN, DEPRESSED OR HOPELESS: 1
SUM OF ALL RESPONSES TO PHQ QUESTIONS 1-9: 9
SUM OF ALL RESPONSES TO PHQ QUESTIONS 1-9: 10
SUM OF ALL RESPONSES TO PHQ QUESTIONS 1-9: 10
5. POOR APPETITE OR OVEREATING: 1
10. IF YOU CHECKED OFF ANY PROBLEMS, HOW DIFFICULT HAVE THESE PROBLEMS MADE IT FOR YOU TO DO YOUR WORK, TAKE CARE OF THINGS AT HOME, OR GET ALONG WITH OTHER PEOPLE: 1
SUM OF ALL RESPONSES TO PHQ9 QUESTIONS 1 & 2: 2
9. THOUGHTS THAT YOU WOULD BE BETTER OFF DEAD, OR OF HURTING YOURSELF: 1
4. FEELING TIRED OR HAVING LITTLE ENERGY: 2
1. LITTLE INTEREST OR PLEASURE IN DOING THINGS: 1
8. MOVING OR SPEAKING SO SLOWLY THAT OTHER PEOPLE COULD HAVE NOTICED. OR THE OPPOSITE, BEING SO FIGETY OR RESTLESS THAT YOU HAVE BEEN MOVING AROUND A LOT MORE THAN USUAL: 1
6. FEELING BAD ABOUT YOURSELF - OR THAT YOU ARE A FAILURE OR HAVE LET YOURSELF OR YOUR FAMILY DOWN: 1

## 2023-03-09 NOTE — ASSESSMENT & PLAN NOTE
LDL not at goal.  On max dose Lipitor. She does have coronary disease with prior PCI's.   Needs to be on Leqvio

## 2023-03-14 ENCOUNTER — PROCEDURE VISIT (OUTPATIENT)
Dept: ENDOCRINOLOGY | Age: 63
End: 2023-03-14
Payer: MEDICAID

## 2023-03-14 ENCOUNTER — TELEPHONE (OUTPATIENT)
Dept: CARDIOLOGY CLINIC | Age: 63
End: 2023-03-14

## 2023-03-14 DIAGNOSIS — E11.40 TYPE 2 DIABETES, CONTROLLED, WITH NEUROPATHY (HCC): Primary | ICD-10-CM

## 2023-03-14 PROCEDURE — 95251 CONT GLUC MNTR ANALYSIS I&R: CPT | Performed by: INTERNAL MEDICINE

## 2023-03-14 RX ORDER — INSULIN GLARGINE 100 [IU]/ML
INJECTION, SOLUTION SUBCUTANEOUS
Qty: 15 ML | Refills: 3
Start: 2023-03-14

## 2023-03-14 NOTE — TELEPHONE ENCOUNTER
Saloni Oconnor not covered by patient's insurance. Currently on lipitor 40 mg daily, last . Options are Zetia and Repatha.

## 2023-03-15 NOTE — PROGRESS NOTES
CGMS Download Review and Recommendations  See scanned document for blood glucose tracing documentation  [employer] personal CGMS data downloaded and reviewed. Average glucose 189± S 29.3 D  Time in range: 50%  Time above 180: 50 %  Time under 70: 0%   GMI 7.8%    Basal pattern review: Variable basal patterns  Postprandial pattern review: Postprandial hyperglycemia  Hypoglycemia review: Occasional hypoglycemia at night  Activity related review: Not recorded      Based on the data, I recommend:    1. Healthy eating, portion control    2. Timely Humalog injections before meals    3. Do not underestimate carbohydrates. Patient enters similar carbohydrate amount, but blood glucose patterns are different in response    4. Humalog 9 units for breakfast, 8 units for lunch and 9 units for dinner    5. Restart Jardiance 25 mg daily    6. If blood glucose remains uncontrolled and no nighttime hypoglycemia, increase Lantus to 28 units. Currently on 26 units. These recommendations were discussed with patient in details. Questions were answered.     Yared Jorgensen MD

## 2023-03-15 NOTE — TELEPHONE ENCOUNTER
Spoke with patient and reviewed Repatha. Patient would like to think about it before ordering. She will call back if she is interested in the medication.

## 2023-04-05 ASSESSMENT — ENCOUNTER SYMPTOMS
GASTROINTESTINAL NEGATIVE: 1
EYES NEGATIVE: 1
RESPIRATORY NEGATIVE: 1
ALLERGIC/IMMUNOLOGIC NEGATIVE: 1

## 2023-05-01 RX ORDER — LEVOTHYROXINE SODIUM 0.07 MG/1
75 TABLET ORAL
Qty: 90 TABLET | Refills: 0 | Status: SHIPPED | OUTPATIENT
Start: 2023-05-01

## 2023-05-08 RX ORDER — LEVOTHYROXINE SODIUM 0.07 MG/1
75 TABLET ORAL
Qty: 90 TABLET | Refills: 0 | OUTPATIENT
Start: 2023-05-08

## 2023-05-11 RX ORDER — EMPAGLIFLOZIN 25 MG/1
TABLET, FILM COATED ORAL
Qty: 90 TABLET | Refills: 1 | Status: SHIPPED | OUTPATIENT
Start: 2023-05-11

## 2023-05-15 ENCOUNTER — OFFICE VISIT (OUTPATIENT)
Dept: INTERNAL MEDICINE CLINIC | Age: 63
End: 2023-05-15
Payer: MEDICAID

## 2023-05-15 VITALS
BODY MASS INDEX: 26.47 KG/M2 | SYSTOLIC BLOOD PRESSURE: 106 MMHG | OXYGEN SATURATION: 97 % | HEART RATE: 72 BPM | WEIGHT: 164 LBS | DIASTOLIC BLOOD PRESSURE: 62 MMHG

## 2023-05-15 DIAGNOSIS — K21.9 GASTROESOPHAGEAL REFLUX DISEASE WITHOUT ESOPHAGITIS: ICD-10-CM

## 2023-05-15 DIAGNOSIS — C95.11 CHRONIC LEUKEMIA IN REMISSION (HCC): ICD-10-CM

## 2023-05-15 DIAGNOSIS — L29.9 PRURITIC DISORDER: ICD-10-CM

## 2023-05-15 DIAGNOSIS — D72.819 LEUKOPENIA, UNSPECIFIED TYPE: ICD-10-CM

## 2023-05-15 DIAGNOSIS — R30.0 DYSURIA: Primary | ICD-10-CM

## 2023-05-15 DIAGNOSIS — J45.30 MILD PERSISTENT ASTHMA WITHOUT COMPLICATION: ICD-10-CM

## 2023-05-15 DIAGNOSIS — Z12.31 ENCOUNTER FOR SCREENING MAMMOGRAM FOR MALIGNANT NEOPLASM OF BREAST: ICD-10-CM

## 2023-05-15 LAB
BILIRUBIN, POC: NEGATIVE
BLOOD URINE, POC: NEGATIVE
CLARITY, POC: NORMAL
COLOR, POC: NORMAL
GLUCOSE URINE, POC: NORMAL
KETONES, POC: NEGATIVE
LEUKOCYTE EST, POC: NEGATIVE
NITRITE, POC: NEGATIVE
PH, POC: 5
PROTEIN, POC: NEGATIVE
SPECIFIC GRAVITY, POC: 1.01
UROBILINOGEN, POC: NORMAL

## 2023-05-15 PROCEDURE — 3074F SYST BP LT 130 MM HG: CPT | Performed by: INTERNAL MEDICINE

## 2023-05-15 PROCEDURE — 99214 OFFICE O/P EST MOD 30 MIN: CPT | Performed by: INTERNAL MEDICINE

## 2023-05-15 PROCEDURE — 3017F COLORECTAL CA SCREEN DOC REV: CPT | Performed by: INTERNAL MEDICINE

## 2023-05-15 PROCEDURE — 1036F TOBACCO NON-USER: CPT | Performed by: INTERNAL MEDICINE

## 2023-05-15 PROCEDURE — 90750 HZV VACC RECOMBINANT IM: CPT | Performed by: INTERNAL MEDICINE

## 2023-05-15 PROCEDURE — 81002 URINALYSIS NONAUTO W/O SCOPE: CPT | Performed by: INTERNAL MEDICINE

## 2023-05-15 PROCEDURE — 90471 IMMUNIZATION ADMIN: CPT | Performed by: INTERNAL MEDICINE

## 2023-05-15 PROCEDURE — G8427 DOCREV CUR MEDS BY ELIG CLIN: HCPCS | Performed by: INTERNAL MEDICINE

## 2023-05-15 PROCEDURE — 3078F DIAST BP <80 MM HG: CPT | Performed by: INTERNAL MEDICINE

## 2023-05-15 PROCEDURE — G8417 CALC BMI ABV UP PARAM F/U: HCPCS | Performed by: INTERNAL MEDICINE

## 2023-05-15 RX ORDER — MONTELUKAST SODIUM 10 MG/1
10 TABLET ORAL DAILY
Qty: 30 TABLET | Refills: 0 | Status: SHIPPED | OUTPATIENT
Start: 2023-05-15

## 2023-05-15 SDOH — ECONOMIC STABILITY: INCOME INSECURITY: HOW HARD IS IT FOR YOU TO PAY FOR THE VERY BASICS LIKE FOOD, HOUSING, MEDICAL CARE, AND HEATING?: NOT VERY HARD

## 2023-05-15 SDOH — ECONOMIC STABILITY: FOOD INSECURITY: WITHIN THE PAST 12 MONTHS, YOU WORRIED THAT YOUR FOOD WOULD RUN OUT BEFORE YOU GOT MONEY TO BUY MORE.: SOMETIMES TRUE

## 2023-05-15 SDOH — ECONOMIC STABILITY: FOOD INSECURITY: WITHIN THE PAST 12 MONTHS, THE FOOD YOU BOUGHT JUST DIDN'T LAST AND YOU DIDN'T HAVE MONEY TO GET MORE.: NEVER TRUE

## 2023-05-15 SDOH — ECONOMIC STABILITY: HOUSING INSECURITY
IN THE LAST 12 MONTHS, WAS THERE A TIME WHEN YOU DID NOT HAVE A STEADY PLACE TO SLEEP OR SLEPT IN A SHELTER (INCLUDING NOW)?: NO

## 2023-05-15 NOTE — PATIENT INSTRUCTIONS
Paul Qureshi  What they offer: The [address] [place] [address] Program helps to extend the [facility] by improving the health of our communities with emphasis on people who are poor and under-served. We care for everyone who comes to us in need regardless of his or her ability to pay. At [facility] ([facility]), we recognize the difficulties that unexpected medical problems can cause. That's why our [facility] health systems have financial assistance programs that are designed to assist you in finding resources that may help pay your hospital bill. Please click on the links below to learn more about the financial assistance programs available within our regions. Phone Number: [phone]  How to apply for the [address] [place] [address] Program:       Option 1: To apply for financial assistance, a patient (or their family or other provider) should fill out the Financial Assistance Application. Copies of the Financial Assistance Application and the FAP may be obtained for free by calling the [facility] Service department at [phone]   Option 2: The Financial Assistance Application and policy may be obtained for free by downloading a copy from the [facility]) website:  [employer]/patient-resources/financial-assistance  Applications are available in several languages on the website  [facility]  What they offer: If financial strain is hindering your ability to meet health care needs, the [facility] may be able to help. It provides support to ambulatory patients facing complex health issues and social barriers.  These services are provided at no cost.   Eligible Patients  Adults who are at or below 200% poverty level  Uninsured, underinsured, or those at risk of losing health insurance  You may be eligible to receive:  One-on-one support enrolling in [facility], Marketplace health care coverage,

## 2023-05-15 NOTE — PROGRESS NOTES
Jose Joy (:  1960) is a 58 y.o. female, here for evaluation of the following chief complaint(s):    6 Month Follow-Up and Urinary Frequency (Burning with urination, urgency & frequency)      ASSESSMENT/PLAN:  1. Dysuria  -     POCT Urinalysis no Micro  -     Culture, Urine  2. Mild persistent asthma without complication  Fair control on albuterol and zyrtec  -     montelukast (SINGULAIR) 10 MG tablet; Take 1 tablet by mouth daily, Disp-30 tablet, R-0Normal  3. Leukopenia, unspecified type  -     CBC; Future  4. Chronic leukemia in remission ([facility] [address]  -     [facility] - Kiley Ny DO, Bone Marrow Transplant, [facility]  5. Gastroesophageal reflux disease without esophagitis  Stable on PPI  -     Magnesium; Future  6. Encounter for screening mammogram for malignant neoplasm of breast  -     [facility] K[facility]STIAN DIGITAL SCREEN BILATERAL; Future  7. Pruritic disorder  -     Jacquelyn Pineda MD, Dermatology, [facility]    ---  Other specified hypothyroidism  Due to issues with getting the correct amount of levothyroxine every month, I had changed her prescription to 75 mcg daily but skip 1 day/week. She will be due for repeat thyroid ultrasound in around      Primary insomnia  Her insomnia is well controlled with trazodone, Type 2 diabetes mellitus with complications [facility])  Patient sees endocrinology. Remains on insulin and metformin and now jardiance. Chronic ischemic heart disease  -     Saw Dr Alexus Soliz who adjusted meds - on metoprolol, asa/statin  CORONA (nonalcoholic steatohepatitis)  Had u/s of liver with GI    Diabetic polyneuropathy associated with type 2 diabetes mellitus   Stable on duloxetine     Other hyperlipidemia  -    stable on atorvastatin  B12 deficiency  -   Anxiety and depression   Patient feels her symptoms are better controlled. Sees Dr. Arias Gr.   Remains on duloxetine      Sleep apnea, unspecified type  -     [address]  Sleep Medicine - cpap advised  Return in about 6 months

## 2023-05-16 LAB
DEPRECATED RDW RBC AUTO: 15.3 % (ref 12.4–15.4)
HCT VFR BLD AUTO: 38.2 % (ref 36–48)
HGB BLD-MCNC: 13.3 G/DL (ref 12–16)
MAGNESIUM SERPL-MCNC: 1.8 MG/DL (ref 1.8–2.4)
MCH RBC QN AUTO: 31 PG (ref 26–34)
MCHC RBC AUTO-ENTMCNC: 34.7 G/DL (ref 31–36)
MCV RBC AUTO: 89.2 FL (ref 80–100)
PLATELET # BLD AUTO: 188 K/UL (ref 135–450)
PMV BLD AUTO: 9.7 FL (ref 5–10.5)
RBC # BLD AUTO: 4.29 M/UL (ref 4–5.2)
WBC # BLD AUTO: 3.9 K/UL (ref 4–11)

## 2023-05-17 LAB — BACTERIA UR CULT: NORMAL

## 2023-05-18 ENCOUNTER — TELEPHONE (OUTPATIENT)
Dept: ENDOCRINOLOGY | Age: 63
End: 2023-05-18

## 2023-05-18 NOTE — TELEPHONE ENCOUNTER
Pt uses marybeth 2 and it came out of her arm and she needs to know what she can do and has questions if she can use a different kind

## 2023-05-28 DIAGNOSIS — E11.40 TYPE 2 DIABETES, CONTROLLED, WITH NEUROPATHY (HCC): ICD-10-CM

## 2023-05-30 RX ORDER — LANOLIN ALCOHOL/MO/W.PET/CERES
400 CREAM (GRAM) TOPICAL 3 TIMES DAILY
Qty: 90 TABLET | Refills: 3 | Status: SHIPPED | OUTPATIENT
Start: 2023-05-30 | End: 2023-06-29

## 2023-06-07 ENCOUNTER — TELEPHONE (OUTPATIENT)
Dept: PSYCHIATRY | Age: 63
End: 2023-06-07

## 2023-06-07 ENCOUNTER — OFFICE VISIT (OUTPATIENT)
Dept: PSYCHIATRY | Age: 63
End: 2023-06-07
Payer: MEDICAID

## 2023-06-07 VITALS
DIASTOLIC BLOOD PRESSURE: 72 MMHG | RESPIRATION RATE: 12 BRPM | WEIGHT: 164.6 LBS | HEART RATE: 70 BPM | SYSTOLIC BLOOD PRESSURE: 130 MMHG | BODY MASS INDEX: 26.57 KG/M2

## 2023-06-07 DIAGNOSIS — F33.1 MODERATE EPISODE OF RECURRENT MAJOR DEPRESSIVE DISORDER (HCC): Primary | ICD-10-CM

## 2023-06-07 DIAGNOSIS — F41.1 GAD (GENERALIZED ANXIETY DISORDER): ICD-10-CM

## 2023-06-07 DIAGNOSIS — F33.1 MODERATE EPISODE OF RECURRENT MAJOR DEPRESSIVE DISORDER (HCC): ICD-10-CM

## 2023-06-07 PROCEDURE — G8427 DOCREV CUR MEDS BY ELIG CLIN: HCPCS | Performed by: STUDENT IN AN ORGANIZED HEALTH CARE EDUCATION/TRAINING PROGRAM

## 2023-06-07 PROCEDURE — 1036F TOBACCO NON-USER: CPT | Performed by: STUDENT IN AN ORGANIZED HEALTH CARE EDUCATION/TRAINING PROGRAM

## 2023-06-07 PROCEDURE — 3075F SYST BP GE 130 - 139MM HG: CPT | Performed by: STUDENT IN AN ORGANIZED HEALTH CARE EDUCATION/TRAINING PROGRAM

## 2023-06-07 PROCEDURE — G8417 CALC BMI ABV UP PARAM F/U: HCPCS | Performed by: STUDENT IN AN ORGANIZED HEALTH CARE EDUCATION/TRAINING PROGRAM

## 2023-06-07 PROCEDURE — 3017F COLORECTAL CA SCREEN DOC REV: CPT | Performed by: STUDENT IN AN ORGANIZED HEALTH CARE EDUCATION/TRAINING PROGRAM

## 2023-06-07 PROCEDURE — 99214 OFFICE O/P EST MOD 30 MIN: CPT | Performed by: STUDENT IN AN ORGANIZED HEALTH CARE EDUCATION/TRAINING PROGRAM

## 2023-06-07 PROCEDURE — 3078F DIAST BP <80 MM HG: CPT | Performed by: STUDENT IN AN ORGANIZED HEALTH CARE EDUCATION/TRAINING PROGRAM

## 2023-06-07 RX ORDER — TRAZODONE HYDROCHLORIDE 50 MG/1
75 TABLET ORAL NIGHTLY
Qty: 45 TABLET | Refills: 2 | Status: SHIPPED | OUTPATIENT
Start: 2023-06-07

## 2023-06-07 RX ORDER — DULOXETIN HYDROCHLORIDE 30 MG/1
30 CAPSULE, DELAYED RELEASE ORAL DAILY
Qty: 90 CAPSULE | Refills: 1 | Status: SHIPPED | OUTPATIENT
Start: 2023-06-07

## 2023-06-07 RX ORDER — BUPROPION HYDROCHLORIDE 150 MG/1
150 TABLET ORAL EVERY MORNING
Qty: 30 TABLET | Refills: 2 | Status: SHIPPED | OUTPATIENT
Start: 2023-06-07 | End: 2023-06-07

## 2023-06-07 RX ORDER — DULOXETIN HYDROCHLORIDE 60 MG/1
60 CAPSULE, DELAYED RELEASE ORAL DAILY
Qty: 90 CAPSULE | Refills: 1 | Status: SHIPPED | OUTPATIENT
Start: 2023-06-07 | End: 2023-06-07

## 2023-06-07 RX ORDER — DULOXETIN HYDROCHLORIDE 60 MG/1
60 CAPSULE, DELAYED RELEASE ORAL DAILY
Qty: 90 CAPSULE | Refills: 1 | Status: SHIPPED | OUTPATIENT
Start: 2023-06-07

## 2023-06-07 RX ORDER — BUPROPION HYDROCHLORIDE 150 MG/1
150 TABLET ORAL EVERY MORNING
Qty: 30 TABLET | Refills: 2 | Status: SHIPPED | OUTPATIENT
Start: 2023-06-07

## 2023-06-07 ASSESSMENT — ANXIETY QUESTIONNAIRES
4. TROUBLE RELAXING: 1
3. WORRYING TOO MUCH ABOUT DIFFERENT THINGS: 1
2. NOT BEING ABLE TO STOP OR CONTROL WORRYING: 1
6. BECOMING EASILY ANNOYED OR IRRITABLE: 1
GAD7 TOTAL SCORE: 10
IF YOU CHECKED OFF ANY PROBLEMS ON THIS QUESTIONNAIRE, HOW DIFFICULT HAVE THESE PROBLEMS MADE IT FOR YOU TO DO YOUR WORK, TAKE CARE OF THINGS AT HOME, OR GET ALONG WITH OTHER PEOPLE: VERY DIFFICULT
1. FEELING NERVOUS, ANXIOUS, OR ON EDGE: 1
7. FEELING AFRAID AS IF SOMETHING AWFUL MIGHT HAPPEN: 3
5. BEING SO RESTLESS THAT IT IS HARD TO SIT STILL: 2

## 2023-06-07 ASSESSMENT — PATIENT HEALTH QUESTIONNAIRE - PHQ9
10. IF YOU CHECKED OFF ANY PROBLEMS, HOW DIFFICULT HAVE THESE PROBLEMS MADE IT FOR YOU TO DO YOUR WORK, TAKE CARE OF THINGS AT HOME, OR GET ALONG WITH OTHER PEOPLE: 2
SUM OF ALL RESPONSES TO PHQ QUESTIONS 1-9: 13
5. POOR APPETITE OR OVEREATING: 0
SUM OF ALL RESPONSES TO PHQ9 QUESTIONS 1 & 2: 4
2. FEELING DOWN, DEPRESSED OR HOPELESS: 2
4. FEELING TIRED OR HAVING LITTLE ENERGY: 3
3. TROUBLE FALLING OR STAYING ASLEEP: 2
9. THOUGHTS THAT YOU WOULD BE BETTER OFF DEAD, OR OF HURTING YOURSELF: 1
1. LITTLE INTEREST OR PLEASURE IN DOING THINGS: 2
8. MOVING OR SPEAKING SO SLOWLY THAT OTHER PEOPLE COULD HAVE NOTICED. OR THE OPPOSITE, BEING SO FIGETY OR RESTLESS THAT YOU HAVE BEEN MOVING AROUND A LOT MORE THAN USUAL: 1
SUM OF ALL RESPONSES TO PHQ QUESTIONS 1-9: 13
SUM OF ALL RESPONSES TO PHQ QUESTIONS 1-9: 13
7. TROUBLE CONCENTRATING ON THINGS, SUCH AS READING THE NEWSPAPER OR WATCHING TELEVISION: 1
SUM OF ALL RESPONSES TO PHQ QUESTIONS 1-9: 12
6. FEELING BAD ABOUT YOURSELF - OR THAT YOU ARE A FAILURE OR HAVE LET YOURSELF OR YOUR FAMILY DOWN: 1

## 2023-06-07 ASSESSMENT — ENCOUNTER SYMPTOMS
ALLERGIC/IMMUNOLOGIC NEGATIVE: 1
SHORTNESS OF BREATH: 1
GASTROINTESTINAL NEGATIVE: 1
EYES NEGATIVE: 1

## 2023-06-07 NOTE — TELEPHONE ENCOUNTER
Patient is calling because she went to pharmacy but they only gave her 2 RX's. They state they did not get the request for the Wellbutrin or the Cynbalta 60 mg. It shows received by pharmacy on our end but they state they do not have. Please re-send both of these requests to  pharmacy.

## 2023-06-08 LAB
25(OH)D3 SERPL-MCNC: 49.4 NG/ML
FOLATE SERPL-MCNC: >20 NG/ML (ref 4.78–24.2)
VIT B12 SERPL-MCNC: 718 PG/ML (ref 211–911)

## 2023-06-20 ENCOUNTER — OFFICE VISIT (OUTPATIENT)
Dept: ENT CLINIC | Age: 63
End: 2023-06-20
Payer: MEDICAID

## 2023-06-20 ENCOUNTER — PROCEDURE VISIT (OUTPATIENT)
Dept: AUDIOLOGY | Age: 63
End: 2023-06-20
Payer: MEDICAID

## 2023-06-20 VITALS
WEIGHT: 166 LBS | HEART RATE: 69 BPM | SYSTOLIC BLOOD PRESSURE: 139 MMHG | DIASTOLIC BLOOD PRESSURE: 83 MMHG | BODY MASS INDEX: 26.68 KG/M2 | OXYGEN SATURATION: 97 % | HEIGHT: 66 IN

## 2023-06-20 DIAGNOSIS — J30.9 ALLERGIC RHINITIS, UNSPECIFIED SEASONALITY, UNSPECIFIED TRIGGER: Primary | ICD-10-CM

## 2023-06-20 DIAGNOSIS — J31.0 CHRONIC RHINITIS: ICD-10-CM

## 2023-06-20 DIAGNOSIS — H90.3 SENSORINEURAL HEARING LOSS, BILATERAL: Primary | ICD-10-CM

## 2023-06-20 DIAGNOSIS — J34.89 NASAL OBSTRUCTION: ICD-10-CM

## 2023-06-20 DIAGNOSIS — G47.33 OSA (OBSTRUCTIVE SLEEP APNEA): ICD-10-CM

## 2023-06-20 DIAGNOSIS — J34.2 DEVIATED NASAL SEPTUM: ICD-10-CM

## 2023-06-20 DIAGNOSIS — H90.3 SENSORINEURAL HEARING LOSS (SNHL) OF BOTH EARS: ICD-10-CM

## 2023-06-20 DIAGNOSIS — E03.9 ACQUIRED HYPOTHYROIDISM: ICD-10-CM

## 2023-06-20 DIAGNOSIS — J34.3 HYPERTROPHY OF BOTH INFERIOR NASAL TURBINATES: ICD-10-CM

## 2023-06-20 DIAGNOSIS — H93.13 TINNITUS OF BOTH EARS: ICD-10-CM

## 2023-06-20 PROCEDURE — 99214 OFFICE O/P EST MOD 30 MIN: CPT | Performed by: STUDENT IN AN ORGANIZED HEALTH CARE EDUCATION/TRAINING PROGRAM

## 2023-06-20 PROCEDURE — 1036F TOBACCO NON-USER: CPT | Performed by: STUDENT IN AN ORGANIZED HEALTH CARE EDUCATION/TRAINING PROGRAM

## 2023-06-20 PROCEDURE — 92567 TYMPANOMETRY: CPT | Performed by: AUDIOLOGIST

## 2023-06-20 PROCEDURE — G8417 CALC BMI ABV UP PARAM F/U: HCPCS | Performed by: STUDENT IN AN ORGANIZED HEALTH CARE EDUCATION/TRAINING PROGRAM

## 2023-06-20 PROCEDURE — 3079F DIAST BP 80-89 MM HG: CPT | Performed by: STUDENT IN AN ORGANIZED HEALTH CARE EDUCATION/TRAINING PROGRAM

## 2023-06-20 PROCEDURE — 31231 NASAL ENDOSCOPY DX: CPT | Performed by: STUDENT IN AN ORGANIZED HEALTH CARE EDUCATION/TRAINING PROGRAM

## 2023-06-20 PROCEDURE — 92557 COMPREHENSIVE HEARING TEST: CPT | Performed by: AUDIOLOGIST

## 2023-06-20 PROCEDURE — G8427 DOCREV CUR MEDS BY ELIG CLIN: HCPCS | Performed by: STUDENT IN AN ORGANIZED HEALTH CARE EDUCATION/TRAINING PROGRAM

## 2023-06-20 PROCEDURE — 3017F COLORECTAL CA SCREEN DOC REV: CPT | Performed by: STUDENT IN AN ORGANIZED HEALTH CARE EDUCATION/TRAINING PROGRAM

## 2023-06-20 PROCEDURE — 3075F SYST BP GE 130 - 139MM HG: CPT | Performed by: STUDENT IN AN ORGANIZED HEALTH CARE EDUCATION/TRAINING PROGRAM

## 2023-06-20 NOTE — PROGRESS NOTES
function. Acoustic Reflexes: Ipsilateral: Could not maintain seal. Contralateral: Could not maintain seal.    LEFT EAR:  Hearing Sensitivity: Normal hearing sensitivity to Moderate   Sensorineural hearing loss  Speech Recognition Threshold: 30 dB HL  Word Recognition:Excellent (100%), based on NU-6 25-word list at 75m dBHL using recorded speech stimuli. Tympanometry: Normal peak pressure and compliance, Type A tympanogram, consistent with normal middle ear function. Acoustic Reflexes: Ipsilateral: Could not maintain seal. Contralateral: Could not maintain seal.    Reliability: Good  Transducer: HF Headphones    See scanned audiogram dated 6/20/2023  for results. PATIENT EDUCATION:     The following items were discussed with the patient:   - Good Communication Strategies  - Hearing Loss and Hearing Aids    Educational information was shared in the After Visit Summary. RECOMMENDATIONS:                                                                                                                                                                                                                                                          The following items are recommended based on patient report and results from today's appointment:   - Continue medical follow-up with Darius Carney MD.   - Retest hearing as medically indicated and/or sooner if a change in hearing is noted. - If desired, schedule a Hearing Aid Evaluation (HAE) appointment to discuss hearing aid options. - Utilize \"Good Communication Strategies\" as discussed to assist in speech understanding with communication partners. - Maintain a sound enriched environment to assist in the management of tinnitus symptoms.        Sathish Hobbs  Audiologist    Chart CC'd to: Draius Carney MD      Degree of   Hearing Sensitivity dB Range   Within Normal Limits (WNL) 0 - 20   Mild 20 - 40   Moderate 40 - 55

## 2023-06-20 NOTE — PROGRESS NOTES
Sapna (:  1960) is a 58 y.o. female, here for evaluation of the following chief complaint(s):  Ear Problem      ASSESSMENT/PLAN:  1. Allergic rhinitis, unspecified seasonality, unspecified trigger  -     Amb External Referral To Allergy  2. Nasal obstruction  3. Hypertrophy of both inferior nasal turbinates  4. Deviated nasal septum  5. Chronic rhinitis  6. Acquired hypothyroidism  7. CHRIS (obstructive sleep apnea)  8. Tinnitus of both ears  9. Sensorineural hearing loss (SNHL) of both ears      This is a very pleasant 58 y.o. female here today for evaluation of the the above-noted complaints.      -Continue follow-up with sleep medicine for intolerance of CPAP. Could consider inspire  -Stop fluticasone  -Stop saline irrigations  -Prescription of Atrovent prescribed for chronic rhinitis, discussed other treatment options including surgical procedure of cryoablation of the posterior nasal nerves. -Referral to allergy to see if she is a candidate for immunotherapy  -We discussed surgery for her septum and turbinate obstruction.  -Repeat thyroid ultrasound as scheduled    Medical Decision Making: The following items were considered in medical decision making:  Independent review of images  Review / order clinical lab tests  Review / order radiology tests  Decision to obtain old records  Review and summation of old records as accessed through [facility] if applicable    SUBJECTIVE/OBJECTIVE:  JIMMY    Jhonatan Day is here today for evaluation of neck mass and sleep apnea. The patient has acquired hypothyroidism. She initially had a goiter. Patient has some difficulty swallowing and intermittent voice changes. She has family history of people with thyroid problems including 2 of her aunts. She does not believe she has had history of thyroid cancer. She has never had radiation to her neck.   Felt that there used to be

## 2023-06-20 NOTE — PATIENT INSTRUCTIONS
tested by your audiologist and to follow-up with your physician that manages your hearing loss (such as your ENT or Primary Care doctor).

## 2023-06-24 DIAGNOSIS — E11.40 TYPE 2 DIABETES, CONTROLLED, WITH NEUROPATHY (HCC): ICD-10-CM

## 2023-06-24 DIAGNOSIS — J45.30 MILD PERSISTENT ASTHMA WITHOUT COMPLICATION: ICD-10-CM

## 2023-06-25 DIAGNOSIS — J45.30 MILD PERSISTENT ASTHMA WITHOUT COMPLICATION: ICD-10-CM

## 2023-06-26 ENCOUNTER — TELEPHONE (OUTPATIENT)
Dept: INTERNAL MEDICINE CLINIC | Age: 63
End: 2023-06-26

## 2023-06-26 RX ORDER — INSULIN LISPRO 100 [IU]/ML
INJECTION, SOLUTION INTRAVENOUS; SUBCUTANEOUS
Qty: 30 ML | Refills: 0 | Status: SHIPPED | OUTPATIENT
Start: 2023-06-26

## 2023-06-26 RX ORDER — MONTELUKAST SODIUM 10 MG/1
10 TABLET ORAL DAILY
Qty: 30 TABLET | Refills: 2 | Status: SHIPPED | OUTPATIENT
Start: 2023-06-26 | End: 2023-07-21

## 2023-06-26 RX ORDER — MONTELUKAST SODIUM 10 MG/1
TABLET ORAL
Qty: 30 TABLET | Refills: 0 | OUTPATIENT
Start: 2023-06-26

## 2023-06-26 RX ORDER — INSULIN GLARGINE 100 [IU]/ML
INJECTION, SOLUTION SUBCUTANEOUS
Qty: 15 ML | Refills: 0 | Status: SHIPPED | OUTPATIENT
Start: 2023-06-26

## 2023-06-26 NOTE — TELEPHONE ENCOUNTER
Requested Prescriptions     Pending Prescriptions Disp Refills    montelukast (SINGULAIR) 10 MG tablet 30 tablet 0     Sig: Take 1 tablet by mouth daily     Last OV - 5/15/23  Next OV - 11/13/23  Last refill - 5/15/23  Last labs - 5/15/23

## 2023-06-26 NOTE — TELEPHONE ENCOUNTER
Requested Prescriptions     Pending Prescriptions Disp Refills    montelukast (SINGULAIR) 10 MG tablet [Pharmacy Med Name: MONTELUKAST SOD 10 MG TABLET] 30 tablet 0     Sig: TAKE ONE TABLET BY MOUTH DAILY     Last OV - 5/15/23  Next OV - 11/13/23  Last refill - 5/15/23  Last labs - 5/15/23

## 2023-07-02 DIAGNOSIS — K21.9 GASTROESOPHAGEAL REFLUX DISEASE WITHOUT ESOPHAGITIS: ICD-10-CM

## 2023-07-03 RX ORDER — PANTOPRAZOLE SODIUM 40 MG/1
TABLET, DELAYED RELEASE ORAL
Qty: 90 TABLET | Refills: 1 | Status: SHIPPED | OUTPATIENT
Start: 2023-07-03

## 2023-07-03 RX ORDER — PANTOPRAZOLE SODIUM 40 MG/1
40 TABLET, DELAYED RELEASE ORAL DAILY
Qty: 90 TABLET | Refills: 1 | OUTPATIENT
Start: 2023-07-03

## 2023-07-19 NOTE — PROGRESS NOTES
PSYCHIATRY PROGRESS NOTE    Lindsey Oliva  1960  07/20/2023  Face to Face time: 30 minutes  PCP: Jarad Neff MD    CC:   Chief Complaint   Patient presents with    Follow-up       Patient is a 58 y.o. female with a past medical history significant for leukemia at age 11, subarachnoid hemorrhage, CHRIS, hypertension, RLS, diabetes with polyneuropathy, coronary artery disease status post stenting, GERD, hypothyroidism, and arthritis who presents to the outpatient psychiatric clinic today for evaluation management of depression and anxiety. A:  Patient's presentation today is indicative of improvement in her underlying depressive condition, though she continues to note memory struggles. We will continue to provide her with ongoing support. Diagnosis:  Major depressive disorder, recurrent moderate  Generalized anxiety disorder  Concerns for a mild cognitive impairment    P:   No changes to current regimen. Continue trazodone 75mg nightly, duloxetine 90mg daily, bupropion xl 150mg daily. Medication Monitoring:    - PDMP reviewed: No current prescriptions     Follow-up: 4 weeks    Safety: Pt was counseled on the potential for increased suicidal ideations and advised on potential options for dealing with these including hotlines, calling the office, or going to the nearest emergency room. __________________________________________________________________________    S:   Patient noted that things were a bit better. She's seen improvements in her concentration, not feeling as anxious or depressed. She stated that her thinking is definitely clearer, which makes her happy. She still notes feeling a bit tired, like her body isn't cooperating with her awakened mind. She still has some forgetting/word dropping, notes that there are definite memory problems still present. The patient denied any SI/HI/AVH on evaluation. ROS:  Review of Systems   Constitutional: Negative.     HENT:

## 2023-07-20 ENCOUNTER — OFFICE VISIT (OUTPATIENT)
Dept: PSYCHIATRY | Age: 63
End: 2023-07-20
Payer: MEDICAID

## 2023-07-20 ENCOUNTER — OFFICE VISIT (OUTPATIENT)
Dept: PSYCHOLOGY | Age: 63
End: 2023-07-20

## 2023-07-20 ENCOUNTER — HOSPITAL ENCOUNTER (OUTPATIENT)
Dept: WOMENS IMAGING | Age: 63
Discharge: HOME | End: 2023-07-20
Payer: MEDICAID

## 2023-07-20 VITALS
HEART RATE: 76 BPM | WEIGHT: 167.6 LBS | HEIGHT: 66 IN | SYSTOLIC BLOOD PRESSURE: 134 MMHG | BODY MASS INDEX: 26.93 KG/M2 | DIASTOLIC BLOOD PRESSURE: 76 MMHG

## 2023-07-20 DIAGNOSIS — F41.9 ANXIETY: ICD-10-CM

## 2023-07-20 DIAGNOSIS — J45.30 MILD PERSISTENT ASTHMA WITHOUT COMPLICATION: ICD-10-CM

## 2023-07-20 DIAGNOSIS — F33.1 MODERATE EPISODE OF RECURRENT MAJOR DEPRESSIVE DISORDER (HCC): Primary | ICD-10-CM

## 2023-07-20 DIAGNOSIS — F41.1 GAD (GENERALIZED ANXIETY DISORDER): ICD-10-CM

## 2023-07-20 DIAGNOSIS — Z12.31 ENCOUNTER FOR SCREENING MAMMOGRAM FOR MALIGNANT NEOPLASM OF BREAST: ICD-10-CM

## 2023-07-20 PROCEDURE — 3078F DIAST BP <80 MM HG: CPT | Performed by: STUDENT IN AN ORGANIZED HEALTH CARE EDUCATION/TRAINING PROGRAM

## 2023-07-20 PROCEDURE — 99214 OFFICE O/P EST MOD 30 MIN: CPT | Performed by: STUDENT IN AN ORGANIZED HEALTH CARE EDUCATION/TRAINING PROGRAM

## 2023-07-20 PROCEDURE — 77063 BREAST TOMOSYNTHESIS BI: CPT

## 2023-07-20 PROCEDURE — G8427 DOCREV CUR MEDS BY ELIG CLIN: HCPCS | Performed by: STUDENT IN AN ORGANIZED HEALTH CARE EDUCATION/TRAINING PROGRAM

## 2023-07-20 PROCEDURE — 3017F COLORECTAL CA SCREEN DOC REV: CPT | Performed by: STUDENT IN AN ORGANIZED HEALTH CARE EDUCATION/TRAINING PROGRAM

## 2023-07-20 PROCEDURE — 1036F TOBACCO NON-USER: CPT | Performed by: STUDENT IN AN ORGANIZED HEALTH CARE EDUCATION/TRAINING PROGRAM

## 2023-07-20 PROCEDURE — 3074F SYST BP LT 130 MM HG: CPT | Performed by: STUDENT IN AN ORGANIZED HEALTH CARE EDUCATION/TRAINING PROGRAM

## 2023-07-20 PROCEDURE — G8417 CALC BMI ABV UP PARAM F/U: HCPCS | Performed by: STUDENT IN AN ORGANIZED HEALTH CARE EDUCATION/TRAINING PROGRAM

## 2023-07-20 ASSESSMENT — PATIENT HEALTH QUESTIONNAIRE - PHQ9
SUM OF ALL RESPONSES TO PHQ QUESTIONS 1-9: 10
SUM OF ALL RESPONSES TO PHQ QUESTIONS 1-9: 11
7. TROUBLE CONCENTRATING ON THINGS, SUCH AS READING THE NEWSPAPER OR WATCHING TELEVISION: 1
3. TROUBLE FALLING OR STAYING ASLEEP: 1
2. FEELING DOWN, DEPRESSED OR HOPELESS: 2
5. POOR APPETITE OR OVEREATING: 0
6. FEELING BAD ABOUT YOURSELF - OR THAT YOU ARE A FAILURE OR HAVE LET YOURSELF OR YOUR FAMILY DOWN: 1
1. LITTLE INTEREST OR PLEASURE IN DOING THINGS: 2
4. FEELING TIRED OR HAVING LITTLE ENERGY: 2
8. MOVING OR SPEAKING SO SLOWLY THAT OTHER PEOPLE COULD HAVE NOTICED. OR THE OPPOSITE, BEING SO FIGETY OR RESTLESS THAT YOU HAVE BEEN MOVING AROUND A LOT MORE THAN USUAL: 1
9. THOUGHTS THAT YOU WOULD BE BETTER OFF DEAD, OR OF HURTING YOURSELF: 1
10. IF YOU CHECKED OFF ANY PROBLEMS, HOW DIFFICULT HAVE THESE PROBLEMS MADE IT FOR YOU TO DO YOUR WORK, TAKE CARE OF THINGS AT HOME, OR GET ALONG WITH OTHER PEOPLE: 1
SUM OF ALL RESPONSES TO PHQ9 QUESTIONS 1 & 2: 4
SUM OF ALL RESPONSES TO PHQ QUESTIONS 1-9: 11
SUM OF ALL RESPONSES TO PHQ QUESTIONS 1-9: 11

## 2023-07-20 ASSESSMENT — ANXIETY QUESTIONNAIRES
2. NOT BEING ABLE TO STOP OR CONTROL WORRYING: 1
1. FEELING NERVOUS, ANXIOUS, OR ON EDGE: 1
IF YOU CHECKED OFF ANY PROBLEMS ON THIS QUESTIONNAIRE, HOW DIFFICULT HAVE THESE PROBLEMS MADE IT FOR YOU TO DO YOUR WORK, TAKE CARE OF THINGS AT HOME, OR GET ALONG WITH OTHER PEOPLE: SOMEWHAT DIFFICULT
5. BEING SO RESTLESS THAT IT IS HARD TO SIT STILL: 1
3. WORRYING TOO MUCH ABOUT DIFFERENT THINGS: 1
GAD7 TOTAL SCORE: 7
6. BECOMING EASILY ANNOYED OR IRRITABLE: 1
7. FEELING AFRAID AS IF SOMETHING AWFUL MIGHT HAPPEN: 1
4. TROUBLE RELAXING: 1

## 2023-07-20 NOTE — PATIENT INSTRUCTIONS
Perceived Perfectionism. \"People will not love and accept me as a flawed and vulnerable human being. \"    8. Fear of Failure. \"My worthwhileness depends on my achievements (or my intelligence, or status, or attractiveness). \"    9. Fear of Disapproval or Criticism. \"I need everybody's approval to be worthwhile. \"    10. Fear of Rejection or Being Alone. \"If I'm alone, then I'm bound to feel miserable and unfulfilled. If I'm not loved, then life is not worth living. \"        15 Ways to Untwist Your Thinking    1. Identify the Distortions. Write down the negative thought and the distortions in each negative thought using the Cognitive Distortions Checklist.    2. The Straight-Forward Approach. Substitute a more positive and realistic thought. 3. The Cost-Benefit Analysis. List the advantages and disadvantages of a negative feeling, thought, belief or behavior. 4. Examine the Evidence. Instead of assuming that a negative thought is true, examine the actual evidence for it. 5. The Survey Method. Do a survey to find out if your thoughts and attitudes are realistic. 6. The Experimental Technique. Do an experiment to test the validity of your negative thought. 7. The Double-Standard Technique. Talk to yourself in the same compassionate way you might talk to a dear friend who was upset. 8. The Pleasure Predicting Method. Predict how satisfying activities will be from 0% to 100%. Record how satisfying they turn out. 9. The Vertical Arrow Technique. Draw a vertical arrow under your negative thought and ask why it would be upsetting if it were true. 10. Thinking in Shade of Gray. Instead of thinking about your problems in black-or-white categories, evaluate things in shades of gray. 11. Define Terms. When you label yourself as \"inferior\" or \"a loser,\" ask yourself what you mean by these labels. 12. Be Specific. Stick with reality and avoid judgments about reality. 13. The Semantic Method.

## 2023-07-20 NOTE — PROGRESS NOTES
Patient is here for a follow up in person and states that the medication is helping her energy mentally however, not physically.      PHQ:11  KARIE:7    Previous Scores from 6.7.23  PHQ:13  KARIE:10

## 2023-07-20 NOTE — PROGRESS NOTES
More than half the days Several days Several days Several days Several days Several days   Becoming easily annoyed or irritable Several days Several days Several days More than half the days Several days Several days Not at all   Feeling afraid as if something awful might happen Several days Nearly every day Several days Several days Not at all Several days Not at all   KARIE-7 Total Score 7 10 7 10 6 7 5   How difficult have these problems made it for you to do your work, take care of things at home, or get along with other people? Somewhat difficult Very difficult Somewhat difficult Somewhat difficult Somewhat difficult Somewhat difficult Somewhat difficult     KARIE 7 SCORE 7/20/2023 6/7/2023 3/9/2023 1/9/2023 10/11/2022 10/10/2022 8/15/2022   KARIE-7 Total Score 7 10 7 10 6 7 5       Interpretation of Total Score. Anxiety Severity: Score 0-4: Minimal Anxiety. Score 5-9: Mild Anxiety. Score 10-14: Moderate Anxiety. Score greater than 15: Severe Anxiety. Diagnosis:    1. Moderate episode of recurrent major depressive disorder ([facility])    2. Anxiety        Past Medical History      Diagnosis Date    Anxiety and depression     Asthma     AV block, 1st degree     B12 deficiency     Balance disorder     frequent falls    CAD (coronary artery disease)     Chronic ischemic heart disease     sp stents-was at [facility]-dr davalos?     CVA (cerebral vascular accident) [facility])     2003    Diabetic polyneuropathy associated with type 2 diabetes mellitus ([facility])     Diabetic retinopathy ([facility])     Essential (primary) hypertension     GERD (gastroesophageal reflux disease)     H/O heart artery stent     X3    H/O total hysterectomy     History of kidney stones     Hyperparathyroidism ([facility])     Hypothyroidism     Leukemia ([facility])     acute lymphoblastic leukemia- [place]    Low back pain     Mild intermittent asthma without complication     cold induced    Muscle spasms of neck     CORONA (nonalcoholic steatohepatitis)     saw dr Molly Harding    CHRIS

## 2023-07-21 RX ORDER — MONTELUKAST SODIUM 10 MG/1
10 TABLET ORAL DAILY
Qty: 30 TABLET | Refills: 2 | Status: SHIPPED | OUTPATIENT
Start: 2023-07-21

## 2023-07-24 DIAGNOSIS — E04.2 MULTIPLE THYROID NODULES: ICD-10-CM

## 2023-07-24 DIAGNOSIS — J45.30 MILD PERSISTENT ASTHMA WITHOUT COMPLICATION: ICD-10-CM

## 2023-07-24 DIAGNOSIS — E78.49 OTHER HYPERLIPIDEMIA: ICD-10-CM

## 2023-07-24 DIAGNOSIS — E11.40 TYPE 2 DIABETES, CONTROLLED, WITH NEUROPATHY (HCC): ICD-10-CM

## 2023-07-24 DIAGNOSIS — E03.9 ACQUIRED HYPOTHYROIDISM: ICD-10-CM

## 2023-07-24 RX ORDER — MONTELUKAST SODIUM 10 MG/1
10 TABLET ORAL DAILY
Qty: 90 TABLET | Refills: 1 | Status: SHIPPED | OUTPATIENT
Start: 2023-07-24

## 2023-07-24 NOTE — TELEPHONE ENCOUNTER
Last appointment: 5/15/2023  Next appointment: 11/13/2023  Last refill: 7/21/2023  #30 x2    Needs 90 day supply

## 2023-07-25 LAB
ALBUMIN SERPL-MCNC: 3.9 G/DL (ref 3.4–5)
ALBUMIN/GLOB SERPL: 2 {RATIO} (ref 1.1–2.2)
ALP SERPL-CCNC: 95 U/L (ref 40–129)
ALT SERPL-CCNC: 28 U/L (ref 10–40)
ANION GAP SERPL CALCULATED.3IONS-SCNC: 12 MMOL/L (ref 3–16)
AST SERPL-CCNC: 22 U/L (ref 15–37)
BILIRUB SERPL-MCNC: <0.2 MG/DL (ref 0–1)
BUN SERPL-MCNC: 16 MG/DL (ref 7–20)
CALCIUM SERPL-MCNC: 9 MG/DL (ref 8.3–10.6)
CHLORIDE SERPL-SCNC: 104 MMOL/L (ref 99–110)
CHOLEST SERPL-MCNC: 168 MG/DL (ref 0–199)
CO2 SERPL-SCNC: 26 MMOL/L (ref 21–32)
CREAT SERPL-MCNC: 1.1 MG/DL (ref 0.6–1.2)
CREAT UR-MCNC: 139.6 MG/DL (ref 28–259)
EST. AVERAGE GLUCOSE BLD GHB EST-MCNC: 157.1 MG/DL
GFR SERPLBLD CREATININE-BSD FMLA CKD-EPI: 56 ML/MIN/{1.73_M2}
GLUCOSE SERPL-MCNC: 166 MG/DL (ref 70–99)
HBA1C MFR BLD: 7.1 %
HDLC SERPL-MCNC: 66 MG/DL (ref 40–60)
LDL CHOLESTEROL CALCULATED: 76 MG/DL
MICROALBUMIN UR DL<=1MG/L-MCNC: 1.3 MG/DL
MICROALBUMIN/CREAT UR: 9.3 MG/G (ref 0–30)
POTASSIUM SERPL-SCNC: 4.5 MMOL/L (ref 3.5–5.1)
PROT SERPL-MCNC: 5.9 G/DL (ref 6.4–8.2)
SODIUM SERPL-SCNC: 142 MMOL/L (ref 136–145)
T3FREE SERPL-MCNC: 2.1 PG/ML (ref 2.3–4.2)
T4 FREE SERPL-MCNC: 1.2 NG/DL (ref 0.9–1.8)
TRIGL SERPL-MCNC: 131 MG/DL (ref 0–150)
TSH SERPL DL<=0.005 MIU/L-ACNC: 3.61 UIU/ML (ref 0.27–4.2)
VLDLC SERPL CALC-MCNC: 26 MG/DL

## 2023-07-26 ENCOUNTER — OFFICE VISIT (OUTPATIENT)
Dept: ENDOCRINOLOGY | Age: 63
End: 2023-07-26
Payer: MEDICAID

## 2023-07-26 VITALS
SYSTOLIC BLOOD PRESSURE: 123 MMHG | WEIGHT: 167 LBS | BODY MASS INDEX: 26.84 KG/M2 | RESPIRATION RATE: 14 BRPM | HEIGHT: 66 IN | TEMPERATURE: 98 F | HEART RATE: 70 BPM | DIASTOLIC BLOOD PRESSURE: 74 MMHG | OXYGEN SATURATION: 99 %

## 2023-07-26 DIAGNOSIS — E11.40 POORLY CONTROLLED TYPE 2 DIABETES MELLITUS WITH NEUROPATHY (HCC): Primary | ICD-10-CM

## 2023-07-26 DIAGNOSIS — G47.33 OSA (OBSTRUCTIVE SLEEP APNEA): ICD-10-CM

## 2023-07-26 DIAGNOSIS — K75.81 NASH (NONALCOHOLIC STEATOHEPATITIS): ICD-10-CM

## 2023-07-26 DIAGNOSIS — E03.9 ACQUIRED HYPOTHYROIDISM: ICD-10-CM

## 2023-07-26 DIAGNOSIS — I10 PRIMARY HYPERTENSION: ICD-10-CM

## 2023-07-26 DIAGNOSIS — E78.49 OTHER HYPERLIPIDEMIA: ICD-10-CM

## 2023-07-26 DIAGNOSIS — E04.2 MULTIPLE THYROID NODULES: ICD-10-CM

## 2023-07-26 DIAGNOSIS — R22.1 NECK MASS: ICD-10-CM

## 2023-07-26 DIAGNOSIS — E11.65 POORLY CONTROLLED TYPE 2 DIABETES MELLITUS WITH NEUROPATHY (HCC): Primary | ICD-10-CM

## 2023-07-26 DIAGNOSIS — E66.3 OVERWEIGHT (BMI 25.0-29.9): ICD-10-CM

## 2023-07-26 PROCEDURE — G8427 DOCREV CUR MEDS BY ELIG CLIN: HCPCS | Performed by: INTERNAL MEDICINE

## 2023-07-26 PROCEDURE — 3051F HG A1C>EQUAL 7.0%<8.0%: CPT | Performed by: INTERNAL MEDICINE

## 2023-07-26 PROCEDURE — 1036F TOBACCO NON-USER: CPT | Performed by: INTERNAL MEDICINE

## 2023-07-26 PROCEDURE — 3017F COLORECTAL CA SCREEN DOC REV: CPT | Performed by: INTERNAL MEDICINE

## 2023-07-26 PROCEDURE — 95251 CONT GLUC MNTR ANALYSIS I&R: CPT | Performed by: INTERNAL MEDICINE

## 2023-07-26 PROCEDURE — 3078F DIAST BP <80 MM HG: CPT | Performed by: INTERNAL MEDICINE

## 2023-07-26 PROCEDURE — 3074F SYST BP LT 130 MM HG: CPT | Performed by: INTERNAL MEDICINE

## 2023-07-26 PROCEDURE — 2022F DILAT RTA XM EVC RTNOPTHY: CPT | Performed by: INTERNAL MEDICINE

## 2023-07-26 PROCEDURE — G8417 CALC BMI ABV UP PARAM F/U: HCPCS | Performed by: INTERNAL MEDICINE

## 2023-07-26 PROCEDURE — 99214 OFFICE O/P EST MOD 30 MIN: CPT | Performed by: INTERNAL MEDICINE

## 2023-07-26 RX ORDER — LEVOTHYROXINE SODIUM 0.07 MG/1
75 TABLET ORAL DAILY
Qty: 90 TABLET | Refills: 1 | Status: SHIPPED | OUTPATIENT
Start: 2023-07-26

## 2023-07-26 RX ORDER — METFORMIN HYDROCHLORIDE 500 MG/5ML
10 SOLUTION ORAL
COMMUNITY

## 2023-08-09 ENCOUNTER — HOSPITAL ENCOUNTER (OUTPATIENT)
Dept: ULTRASOUND IMAGING | Age: 63
Discharge: HOME | End: 2023-08-09
Payer: MEDICAID

## 2023-08-09 ENCOUNTER — HOSPITAL ENCOUNTER (OUTPATIENT)
Dept: WOMENS IMAGING | Age: 63
Discharge: HOME | End: 2023-08-09
Payer: MEDICAID

## 2023-08-09 DIAGNOSIS — R92.8 ABNORMAL MAMMOGRAM: ICD-10-CM

## 2023-08-09 PROCEDURE — G0279 TOMOSYNTHESIS, MAMMO: HCPCS

## 2023-08-09 PROCEDURE — 76642 ULTRASOUND BREAST LIMITED: CPT

## 2023-08-15 ENCOUNTER — OFFICE VISIT (OUTPATIENT)
Dept: PSYCHOLOGY | Age: 63
End: 2023-08-15
Payer: MEDICAID

## 2023-08-15 DIAGNOSIS — F33.1 MODERATE EPISODE OF RECURRENT MAJOR DEPRESSIVE DISORDER (HCC): Primary | ICD-10-CM

## 2023-08-15 DIAGNOSIS — F41.9 ANXIETY: ICD-10-CM

## 2023-08-15 PROCEDURE — 90832 PSYTX W PT 30 MINUTES: CPT

## 2023-08-15 PROCEDURE — 1036F TOBACCO NON-USER: CPT

## 2023-08-15 ASSESSMENT — ANXIETY QUESTIONNAIRES
GAD7 TOTAL SCORE: 3
4. TROUBLE RELAXING: 3
1. FEELING NERVOUS, ANXIOUS, OR ON EDGE: 0
5. BEING SO RESTLESS THAT IT IS HARD TO SIT STILL: 0
7. FEELING AFRAID AS IF SOMETHING AWFUL MIGHT HAPPEN: 0
2. NOT BEING ABLE TO STOP OR CONTROL WORRYING: 0
6. BECOMING EASILY ANNOYED OR IRRITABLE: 0
3. WORRYING TOO MUCH ABOUT DIFFERENT THINGS: 0
IF YOU CHECKED OFF ANY PROBLEMS ON THIS QUESTIONNAIRE, HOW DIFFICULT HAVE THESE PROBLEMS MADE IT FOR YOU TO DO YOUR WORK, TAKE CARE OF THINGS AT HOME, OR GET ALONG WITH OTHER PEOPLE: SOMEWHAT DIFFICULT

## 2023-08-15 ASSESSMENT — PATIENT HEALTH QUESTIONNAIRE - PHQ9
8. MOVING OR SPEAKING SO SLOWLY THAT OTHER PEOPLE COULD HAVE NOTICED. OR THE OPPOSITE, BEING SO FIGETY OR RESTLESS THAT YOU HAVE BEEN MOVING AROUND A LOT MORE THAN USUAL: 1
4. FEELING TIRED OR HAVING LITTLE ENERGY: 3
SUM OF ALL RESPONSES TO PHQ QUESTIONS 1-9: 11
2. FEELING DOWN, DEPRESSED OR HOPELESS: 1
10. IF YOU CHECKED OFF ANY PROBLEMS, HOW DIFFICULT HAVE THESE PROBLEMS MADE IT FOR YOU TO DO YOUR WORK, TAKE CARE OF THINGS AT HOME, OR GET ALONG WITH OTHER PEOPLE: 1
5. POOR APPETITE OR OVEREATING: 1
SUM OF ALL RESPONSES TO PHQ9 QUESTIONS 1 & 2: 2
SUM OF ALL RESPONSES TO PHQ QUESTIONS 1-9: 11
SUM OF ALL RESPONSES TO PHQ QUESTIONS 1-9: 11
9. THOUGHTS THAT YOU WOULD BE BETTER OFF DEAD, OR OF HURTING YOURSELF: 0
1. LITTLE INTEREST OR PLEASURE IN DOING THINGS: 1
7. TROUBLE CONCENTRATING ON THINGS, SUCH AS READING THE NEWSPAPER OR WATCHING TELEVISION: 1
SUM OF ALL RESPONSES TO PHQ QUESTIONS 1-9: 11
6. FEELING BAD ABOUT YOURSELF - OR THAT YOU ARE A FAILURE OR HAVE LET YOURSELF OR YOUR FAMILY DOWN: 1
3. TROUBLE FALLING OR STAYING ASLEEP: 2

## 2023-08-15 NOTE — PROGRESS NOTES
Behavioral Health Consultation  SCARLETT GONZÁLES Psy.D. Psychologist  8/15/2023  1:01 PM EDT      Time spent with Patient: 30 minutes  This is patient's third  [facility] appointment. Reason for Consult:    Chief Complaint   Patient presents with    Depression    Anxiety     Referring Provider: Brian Osborn, [address] [phone]    Feedback given to PCP: not indicated at this time. S:  Pt reported that her mood is better. Stated that she has not been feeling anxious or nervous but she cannot get her body to relax. Described muscle tension. Has been engaging in breathing exercises and progressive muscle relaxation exercises but they have not been helping. Stated she has been forgetting more which is aggravating and frustrating for her. Feels she has to apologize all the time for \"not giving the right answer. \" Reported she makes lists on her phone, utilizes support from her daughter as compensatory strategies. She thinks she underwent neuropsych testing three years ago at [facility]. She is unsure if they take her insurance at this time. Advised her to check with her insurance. Pt saw neurologist in 2022 and they suggested a neuropsych eval as well.     O:  MSE:    Appearance: good hygiene   Attitude: cooperative and friendly  Consciousness: alert  Orientation: oriented to person, place, time, general circumstance  Memory:  described issues with short-term memory  Attention/Concentration: intact during session  Psychomotor Activity:normal  Eye Contact: normal  Speech:  normal rate and volume, some difficulties word-finding   Mood: \"better\"  Affect:  overall euthymic  Perception: within normal limits  Thought Content: within normal limits  Thought Process: logical, coherent and goal-directed  Insight: good  Judgment: intact  Ability to understand instructions: Yes  Ability to respond meaningfully: Yes  Morbid Ideation: no   Suicide Assessment: no suicidal

## 2023-08-15 NOTE — PATIENT INSTRUCTIONS
Patient premedicated with the 4 hour emergency protocol.  Patient tolerated contrast well.   Return to see Dr. Debby Emery in 2 weeks  Practice passive progressive muscle relaxation exercises (can look these up on YouTube)  Consider downloading fred for relaxation  Utilize compensatory strategies to aid in memory  Call PC office if mood significantly worsens      Compensatory Strategies:     Executive function:  Increase organization  Increase structure (e.g., activity schedules)  Simplify  Decrease clutter  External aids  Write out simple steps  Visual instructions  Calendars  Lists     Memory:  External memory aids  Clocks  Notes  Labels  Calendars  Alarms  Visual cues  Lists  Repetition  Reminisce. Open-ended questions. Adjust expectations  May forget  May repeat themselves frequently  May need repetition     Orientation:  External aids  Easily visible calendars, clocks  Verbal reminders  Technologies (e.g., GPS)     Emotion: For the person with memory or cognitive complaints:  Create opportunities to maintain social relationships and social support  Reminisce     Language:  Adjust expectations  May take longer to retrieve words  May make errors (e.g., in naming)  Ask follow-up questions to improve clarity                  iPhone, iPad, iPod ([place]) Only    Gaze HD [employer]  6 calming beach scenes       free  10 calming beach scenes                $.99    Gaze HD Beautiful Views Life        free  Variety of 8 scenes           Pain [facility]                   $2.99  Based on [employer] for children    Draw pain, color pain        Log for headache and stomachaches   9 audio recordings (breathing, progressive muscle   relaxation, guided imagery)    www.meditationoasis. com    some aspects free, price varies  Adult          Blu Luna for free  Variety of apps for relaxation and sleep       (limited Android and [employer] available)      Both Android and iPhone    Calm                 free trial then $14.99/month  Guided [employer] stories, music

## 2023-08-20 DIAGNOSIS — E78.49 OTHER HYPERLIPIDEMIA: ICD-10-CM

## 2023-08-21 DIAGNOSIS — E11.40 TYPE 2 DIABETES, CONTROLLED, WITH NEUROPATHY (HCC): ICD-10-CM

## 2023-08-21 RX ORDER — ATORVASTATIN CALCIUM 40 MG/1
40 TABLET, FILM COATED ORAL DAILY
Qty: 90 TABLET | Refills: 1 | Status: SHIPPED | OUTPATIENT
Start: 2023-08-21

## 2023-08-21 NOTE — TELEPHONE ENCOUNTER
Medication:   Requested Prescriptions     Pending Prescriptions Disp Refills    atorvastatin (LIPITOR) 40 MG tablet 90 tablet 1     Sig: Take 1 tablet by mouth daily       Last Filled:  1/17/23    Patient Phone Number: [phone] (home)     Last appt: 5/15/2023   Next appt: 11/13/2023    Last Lipid:   Lab Results   Component Value Date/Time    CHOL 194 08/30/2022 11:05 AM    TRIG 146 08/30/2022 11:05 AM    HDL 66 07/24/2023 09:13 AM    LDLCALC 76 07/24/2023 09:13 AM

## 2023-08-27 ASSESSMENT — ENCOUNTER SYMPTOMS
ALLERGIC/IMMUNOLOGIC NEGATIVE: 1
RESPIRATORY NEGATIVE: 1
EYES NEGATIVE: 1
GASTROINTESTINAL NEGATIVE: 1

## 2023-09-01 ASSESSMENT — ENCOUNTER SYMPTOMS
CHEST TIGHTNESS: 0
ABDOMINAL PAIN: 0
ABDOMINAL DISTENTION: 0
COLOR CHANGE: 0
VOMITING: 0
EYE DISCHARGE: 0
BACK PAIN: 0
WHEEZING: 0
BLOOD IN STOOL: 0
COUGH: 0
SHORTNESS OF BREATH: 0
FACIAL SWELLING: 0

## 2023-09-01 NOTE — PROGRESS NOTES
[address] [facility]     Outpatient Cardiology         Patient Name:  Jayro Rogers  Requesting Physician: No admitting provider for patient encounter. Primary Care Physician: Amy Barger MD    Reason for Consultation/Chief Complaint:   Chief Complaint   Patient presents with    Hyperlipidemia       History of Present Illness:    HPI     Jayro Rogers is a 58 y.o. female with PMH of CAD, S/p SAMARIA x 3 to LAD on 2/2/07 complicated with RP bleed after,  HLD, sleep apnea, DM II, HTN, PSVT, SAH, CORONA. Here for follow up for CAD/HLD/HTN/PSVT  CAD, remains asymptomatic, occasional episodes of chest discomfort although these are noncardiac in nature. Continue aspirin and Lipitor  HLD, on Lipitor 40, doing well, LDL at goal  HTN, controlled on metoprolol  PSVT, symptomatic PACs, no recurrence. Continue low-dose metoprolol  Feels very good in general.  Undergoing some work-up regarding memory issues/balance. Doing well from a cardiovascular perspective. PMH  Past Medical History:   Diagnosis Date    Anxiety and depression     Asthma     AV block, 1st degree     B12 deficiency     Balance disorder     frequent falls    CAD (coronary artery disease)     Chronic ischemic heart disease     sp stents-was at [facility]-dr davalos?     CVA (cerebral vascular accident) [facility])     2003    Diabetic polyneuropathy associated with type 2 diabetes mellitus ([address])     Diabetic retinopathy ([address])     Essential (primary) hypertension     GERD (gastroesophageal reflux disease)     H/O heart artery stent     X3    H/O total hysterectomy     History of kidney stones     Hyperparathyroidism ([address])     Hypothyroidism     Leukemia ([address])     acute lymphoblastic leukemia- [facility]    Low back pain     Mild intermittent asthma without complication     cold induced    Muscle spasms of neck     CORONA (nonalcoholic steatohepatitis)     saw dr Jaron Clark    CHRIS (obstructive sleep apnea)     Osteoarthritis     hands knees, hips

## 2023-09-13 ENCOUNTER — TELEMEDICINE (OUTPATIENT)
Dept: PSYCHIATRY | Age: 63
End: 2023-09-13
Payer: MEDICAID

## 2023-09-13 DIAGNOSIS — F33.1 MODERATE EPISODE OF RECURRENT MAJOR DEPRESSIVE DISORDER (HCC): ICD-10-CM

## 2023-09-13 DIAGNOSIS — F41.1 GAD (GENERALIZED ANXIETY DISORDER): Primary | ICD-10-CM

## 2023-09-13 PROCEDURE — 3017F COLORECTAL CA SCREEN DOC REV: CPT | Performed by: STUDENT IN AN ORGANIZED HEALTH CARE EDUCATION/TRAINING PROGRAM

## 2023-09-13 PROCEDURE — G8427 DOCREV CUR MEDS BY ELIG CLIN: HCPCS | Performed by: STUDENT IN AN ORGANIZED HEALTH CARE EDUCATION/TRAINING PROGRAM

## 2023-09-13 PROCEDURE — 1036F TOBACCO NON-USER: CPT | Performed by: STUDENT IN AN ORGANIZED HEALTH CARE EDUCATION/TRAINING PROGRAM

## 2023-09-13 PROCEDURE — 99214 OFFICE O/P EST MOD 30 MIN: CPT | Performed by: STUDENT IN AN ORGANIZED HEALTH CARE EDUCATION/TRAINING PROGRAM

## 2023-09-13 PROCEDURE — G8417 CALC BMI ABV UP PARAM F/U: HCPCS | Performed by: STUDENT IN AN ORGANIZED HEALTH CARE EDUCATION/TRAINING PROGRAM

## 2023-09-13 RX ORDER — BUPROPION HYDROCHLORIDE 300 MG/1
300 TABLET ORAL EVERY MORNING
Qty: 30 TABLET | Refills: 2 | Status: SHIPPED | OUTPATIENT
Start: 2023-09-13

## 2023-09-13 ASSESSMENT — ANXIETY QUESTIONNAIRES
6. BECOMING EASILY ANNOYED OR IRRITABLE: 0
4. TROUBLE RELAXING: 2
7. FEELING AFRAID AS IF SOMETHING AWFUL MIGHT HAPPEN: 0
1. FEELING NERVOUS, ANXIOUS, OR ON EDGE: 1
3. WORRYING TOO MUCH ABOUT DIFFERENT THINGS: 1
5. BEING SO RESTLESS THAT IT IS HARD TO SIT STILL: 1
2. NOT BEING ABLE TO STOP OR CONTROL WORRYING: 1
GAD7 TOTAL SCORE: 6

## 2023-09-13 ASSESSMENT — PATIENT HEALTH QUESTIONNAIRE - PHQ9
3. TROUBLE FALLING OR STAYING ASLEEP: 1
2. FEELING DOWN, DEPRESSED OR HOPELESS: 1
1. LITTLE INTEREST OR PLEASURE IN DOING THINGS: 1
8. MOVING OR SPEAKING SO SLOWLY THAT OTHER PEOPLE COULD HAVE NOTICED. OR THE OPPOSITE, BEING SO FIGETY OR RESTLESS THAT YOU HAVE BEEN MOVING AROUND A LOT MORE THAN USUAL: 1
SUM OF ALL RESPONSES TO PHQ QUESTIONS 1-9: 8
4. FEELING TIRED OR HAVING LITTLE ENERGY: 3
SUM OF ALL RESPONSES TO PHQ QUESTIONS 1-9: 8
7. TROUBLE CONCENTRATING ON THINGS, SUCH AS READING THE NEWSPAPER OR WATCHING TELEVISION: 1
SUM OF ALL RESPONSES TO PHQ QUESTIONS 1-9: 8
SUM OF ALL RESPONSES TO PHQ QUESTIONS 1-9: 8
SUM OF ALL RESPONSES TO PHQ9 QUESTIONS 1 & 2: 2
5. POOR APPETITE OR OVEREATING: 0
9. THOUGHTS THAT YOU WOULD BE BETTER OFF DEAD, OR OF HURTING YOURSELF: 0
6. FEELING BAD ABOUT YOURSELF - OR THAT YOU ARE A FAILURE OR HAVE LET YOURSELF OR YOUR FAMILY DOWN: 0

## 2023-09-13 ASSESSMENT — ENCOUNTER SYMPTOMS
EYES NEGATIVE: 1
RESPIRATORY NEGATIVE: 1
GASTROINTESTINAL NEGATIVE: 1
ALLERGIC/IMMUNOLOGIC NEGATIVE: 1

## 2023-09-13 NOTE — PROGRESS NOTES
PSYCHIATRY PROGRESS NOTE    Fahad Steiner  1960  09/13/2023  Face to Face time: 25 minutes  PCP: Shanta Wellington MD    CC:   Chief Complaint   Patient presents with    Follow-up       Patient is a 58 y.o. female with a past medical history significant for leukemia at age 11, subarachnoid hemorrhage, CHRIS, hypertension, RLS, diabetes with polyneuropathy, coronary artery disease status post stenting, GERD, hypothyroidism, and arthritis who presents to the outpatient psychiatric clinic today for evaluation management of depression and anxiety. A:  Patient's presentation today is indicative of continued difficulties with residual depressive symptoms. We will continue to adjust things to provide her with further support. Diagnosis:  Major depressive disorder, recurrent moderate  Generalized anxiety disorder  Concerns for a mild cognitive impairment    P:   Increase bupropion xl to 300mg daily. Patient was cautioned regarding adverse effects of the medication as had been described to them previously. Continue duloxetine 90mg daily, trazodone 75mg daily    Medication Monitoring:    - PDMP reviewed: No current prescriptions     Follow-up: 6 weeks    Safety: Pt was counseled on the potential for increased suicidal ideations and advised on potential options for dealing with these including hotlines, calling the office, or going to the nearest emergency room. __________________________________________________________________________    S:   Patient noted that she felt about the same as last time. She noted that she has good motivation, feels her moods have lightened a bit, however she's struggling with finding the energy or \"spunk\" to get things started. She's still making efforts to do things to improve cognitive health (brain games, trying to exercise) and has found some improvement from previous states.   The patient indicated that she has even started reading again a little, though that is

## 2023-09-14 ENCOUNTER — OFFICE VISIT (OUTPATIENT)
Dept: CARDIOLOGY CLINIC | Age: 63
End: 2023-09-14
Payer: MEDICAID

## 2023-09-14 VITALS
BODY MASS INDEX: 26.6 KG/M2 | WEIGHT: 164.8 LBS | HEART RATE: 88 BPM | DIASTOLIC BLOOD PRESSURE: 70 MMHG | SYSTOLIC BLOOD PRESSURE: 110 MMHG

## 2023-09-14 DIAGNOSIS — I10 PRIMARY HYPERTENSION: ICD-10-CM

## 2023-09-14 DIAGNOSIS — R00.2 PALPITATIONS: ICD-10-CM

## 2023-09-14 DIAGNOSIS — E78.49 OTHER HYPERLIPIDEMIA: ICD-10-CM

## 2023-09-14 DIAGNOSIS — I25.10 CORONARY ARTERY DISEASE INVOLVING NATIVE CORONARY ARTERY OF NATIVE HEART WITHOUT ANGINA PECTORIS: ICD-10-CM

## 2023-09-14 PROCEDURE — 3017F COLORECTAL CA SCREEN DOC REV: CPT | Performed by: INTERNAL MEDICINE

## 2023-09-14 PROCEDURE — 99214 OFFICE O/P EST MOD 30 MIN: CPT | Performed by: INTERNAL MEDICINE

## 2023-09-14 PROCEDURE — G8417 CALC BMI ABV UP PARAM F/U: HCPCS | Performed by: INTERNAL MEDICINE

## 2023-09-14 PROCEDURE — G8427 DOCREV CUR MEDS BY ELIG CLIN: HCPCS | Performed by: INTERNAL MEDICINE

## 2023-09-14 PROCEDURE — 3074F SYST BP LT 130 MM HG: CPT | Performed by: INTERNAL MEDICINE

## 2023-09-14 PROCEDURE — 3078F DIAST BP <80 MM HG: CPT | Performed by: INTERNAL MEDICINE

## 2023-09-14 PROCEDURE — 1036F TOBACCO NON-USER: CPT | Performed by: INTERNAL MEDICINE

## 2023-09-21 ENCOUNTER — OFFICE VISIT (OUTPATIENT)
Dept: PSYCHOLOGY | Age: 63
End: 2023-09-21
Payer: MEDICAID

## 2023-09-21 DIAGNOSIS — F33.1 MODERATE EPISODE OF RECURRENT MAJOR DEPRESSIVE DISORDER (HCC): Primary | ICD-10-CM

## 2023-09-21 DIAGNOSIS — F41.9 ANXIETY: ICD-10-CM

## 2023-09-21 PROCEDURE — 90832 PSYTX W PT 30 MINUTES: CPT

## 2023-09-21 PROCEDURE — 1036F TOBACCO NON-USER: CPT

## 2023-09-21 ASSESSMENT — ANXIETY QUESTIONNAIRES
1. FEELING NERVOUS, ANXIOUS, OR ON EDGE: 1
GAD7 TOTAL SCORE: 7
IF YOU CHECKED OFF ANY PROBLEMS ON THIS QUESTIONNAIRE, HOW DIFFICULT HAVE THESE PROBLEMS MADE IT FOR YOU TO DO YOUR WORK, TAKE CARE OF THINGS AT HOME, OR GET ALONG WITH OTHER PEOPLE: SOMEWHAT DIFFICULT
3. WORRYING TOO MUCH ABOUT DIFFERENT THINGS: 1
2. NOT BEING ABLE TO STOP OR CONTROL WORRYING: 1
7. FEELING AFRAID AS IF SOMETHING AWFUL MIGHT HAPPEN: 0
4. TROUBLE RELAXING: 2
6. BECOMING EASILY ANNOYED OR IRRITABLE: 1
5. BEING SO RESTLESS THAT IT IS HARD TO SIT STILL: 1

## 2023-09-21 ASSESSMENT — PATIENT HEALTH QUESTIONNAIRE - PHQ9
4. FEELING TIRED OR HAVING LITTLE ENERGY: 3
3. TROUBLE FALLING OR STAYING ASLEEP: 2
9. THOUGHTS THAT YOU WOULD BE BETTER OFF DEAD, OR OF HURTING YOURSELF: 0
SUM OF ALL RESPONSES TO PHQ QUESTIONS 1-9: 10
8. MOVING OR SPEAKING SO SLOWLY THAT OTHER PEOPLE COULD HAVE NOTICED. OR THE OPPOSITE, BEING SO FIGETY OR RESTLESS THAT YOU HAVE BEEN MOVING AROUND A LOT MORE THAN USUAL: 0
5. POOR APPETITE OR OVEREATING: 1
10. IF YOU CHECKED OFF ANY PROBLEMS, HOW DIFFICULT HAVE THESE PROBLEMS MADE IT FOR YOU TO DO YOUR WORK, TAKE CARE OF THINGS AT HOME, OR GET ALONG WITH OTHER PEOPLE: 1
SUM OF ALL RESPONSES TO PHQ QUESTIONS 1-9: 10
SUM OF ALL RESPONSES TO PHQ9 QUESTIONS 1 & 2: 2
7. TROUBLE CONCENTRATING ON THINGS, SUCH AS READING THE NEWSPAPER OR WATCHING TELEVISION: 1
1. LITTLE INTEREST OR PLEASURE IN DOING THINGS: 1
6. FEELING BAD ABOUT YOURSELF - OR THAT YOU ARE A FAILURE OR HAVE LET YOURSELF OR YOUR FAMILY DOWN: 1
2. FEELING DOWN, DEPRESSED OR HOPELESS: 1

## 2023-09-21 NOTE — PROGRESS NOTES
Several days Several days Not at all Several days Several days Several days Several days   Worrying too much about different things Several days Several days Not at all Several days Several days Several days Several days   Trouble relaxing More than half the days More than half the days Nearly every day Several days Several days Several days More than half the days   Being so restless that it is hard to sit still Several days Several days Not at all Several days More than half the days Several days Several days   Becoming easily annoyed or irritable Several days Not at all Not at all Several days Several days Several days More than half the days   Feeling afraid as if something awful might happen Not at all Not at all Not at all Several days Nearly every day Several days Several days   KARIE-7 Total Score 7 6 3 7 10 7 10   How difficult have these problems made it for you to do your work, take care of things at home, or get along with other people? Somewhat difficult  Somewhat difficult Somewhat difficult Very difficult Somewhat difficult Somewhat difficult         9/21/2023    11:00 AM 9/13/2023    11:00 AM 8/15/2023     1:00 PM 7/20/2023     2:00 PM 6/7/2023    12:00 PM 3/9/2023    11:00 AM 1/9/2023     1:00 PM   KARIE 7 SCORE   KARIE-7 Total Score 7 6 3 7 10 7 10       Interpretation of Total Score. Anxiety Severity: Score 0-4: Minimal Anxiety. Score 5-9: Mild Anxiety. Score 10-14: Moderate Anxiety. Score greater than 15: Severe Anxiety. Diagnosis:    1. Moderate episode of recurrent major depressive disorder ([address])    2. Anxiety        Past Medical History      Diagnosis Date    Anxiety and depression     Asthma     AV block, 1st degree     B12 deficiency     Balance disorder     frequent falls    CAD (coronary artery disease)     Chronic ischemic heart disease     sp stents-was at [facility]-dr davalos?     CVA (cerebral vascular accident) [facility])     2003    Diabetic polyneuropathy associated with type 2 diabetes mellitus

## 2023-10-02 RX ORDER — LANOLIN ALCOHOL/MO/W.PET/CERES
400 CREAM (GRAM) TOPICAL 3 TIMES DAILY
Qty: 270 TABLET | Refills: 1 | Status: SHIPPED | OUTPATIENT
Start: 2023-10-02

## 2023-10-02 NOTE — TELEPHONE ENCOUNTER
Last appointment: 5/15/2023  Next appointment: 11/13/2023  Last refill: 5/30/2023  #90 x3 Progress Note - Pulmonary   Brittany Ahumada 60 y o  male MRN: [id number]  Unit/Bed#: [address] [place] [address] Encounter: [id number]    Assessment:  Right lower lobe pneumonia slowly improving  Minor hemoptysis secondary to pneumonia and effective Eliquis  Bronchoscopy today shows no evidence of any bleeding or old blood  No endobronchial lesions  Few scattered white mucus secretions bilaterally  Acute on chronic hypoxemic respiratory failure  Restrictive lung impairment secondary to obesity  Chronic bronchitis  Obstructive sleep apnea    Plan: Today is day 9 of IV ceftriaxone  Give 1 more day of IV ceftriaxone to complete 10 days   Would hold Eliquis for now  May need to hold for 2-3 days more as this patient is still complaining of some intermittent mild hemoptysis  Continue Breo 100 mcg puff daily  Neb treatment with levalbuterol 1 25 mg Atrovent 0 5 mg t i d  Patient is on 3 L of oxygen at present  Continue auto BiPAP with pressure range of 12/5 to 18/11 cm H2O with 2 L of oxygen at bedtime  I did order follow-up chest x-ray for tomorrow morning    Subjective:   Patient states he coughs a small amount of bright red blood today    Objective:     Vitals: Blood pressure 143/87, pulse 74, temperature 97 9 °F (36 6 °C), resp  rate 18, height 5' 10" (1 778 m), weight 120 kg (264 lb 8 8 oz), SpO2 96 %  ,Body mass index is 37 96 kg/m²  Intake/Output Summary (Last 24 hours) at 2/10/2020 1718  Last data filed at 2/10/2020 1541  Gross per 24 hour   Intake 50 ml   Output 2600 ml   Net -2550 ml       Physical Exam: Physical Exam   Constitutional: He is oriented to person, place, and time  He appears well-developed and well-nourished  No distress  HENT:   Head: Normocephalic  Nose: Nose normal    Mouth/Throat: Oropharynx is clear and moist  No oropharyngeal exudate  Eyes: Pupils are equal, round, and reactive to light  Conjunctivae are normal    Neck: Neck supple  No JVD present  No tracheal deviation present  Cardiovascular: Normal rate, regular rhythm and normal heart sounds  Pulmonary/Chest: Effort normal    Lung sounds with few rhonchi bilaterally  No wheezes, crackles or rhonchi   Abdominal: Soft  He exhibits no distension  There is no tenderness  There is no guarding  Musculoskeletal: He exhibits no edema  Lymphadenopathy:     He has no cervical adenopathy  Neurological: He is alert and oriented to person, place, and time  Skin: Skin is warm and dry  No rash noted  Psychiatric: He has a normal mood and affect  His behavior is normal  Thought content normal         Labs: I have personally reviewed pertinent lab results  , ABG:   Lab Results   Component Value Date    PHART 7 415 08/09/2019    WHG6GFH 46 3 (H) 08/09/2019    PO2ART 90 0 08/09/2019    RCU2SKT 29 0 (H) 08/09/2019    BEART 3 7 08/09/2019    SOURCE Radial, Right 08/09/2019   , BNP: No results found for: BNP, CBC:   Lab Results   Component Value Date    WBC 12 55 (H) 02/10/2020    HGB 12 8 02/10/2020    HCT 39 6 02/10/2020    MCV 96 02/10/2020     02/10/2020    ADJUSTEDWBC 8 60 09/14/2016    MCH 31 1 02/10/2020    MCHC 32 3 02/10/2020    RDW 13 0 02/10/2020    MPV 9 3 02/10/2020    NRBC 0 02/10/2020   , CMP:   Lab Results   Component Value Date    K 4 3 02/10/2020    CL 99 (L) 02/10/2020    CO2 30 02/10/2020    BUN 12 02/10/2020    CREATININE 0 88 02/10/2020    CALCIUM 8 5 02/10/2020    AST 20 02/02/2020    ALT 23 02/02/2020    ALKPHOS 70 02/02/2020    EGFR 93 02/10/2020   , PT/INR:   Lab Results   Component Value Date    INR 1 07 02/10/2020   , Troponin: No results found for: TROPONIN    Imaging and other studies: I have personally reviewed pertinent reports     and I have personally reviewed pertinent films in PACS

## 2023-10-06 ENCOUNTER — PATIENT MESSAGE (OUTPATIENT)
Dept: PSYCHIATRY | Age: 63
End: 2023-10-06

## 2023-10-06 DIAGNOSIS — F33.1 MODERATE EPISODE OF RECURRENT MAJOR DEPRESSIVE DISORDER (HCC): ICD-10-CM

## 2023-10-06 RX ORDER — BUPROPION HYDROCHLORIDE 150 MG/1
150 TABLET ORAL EVERY MORNING
Qty: 30 TABLET | Refills: 5 | Status: SHIPPED | OUTPATIENT
Start: 2023-10-06

## 2023-10-17 ENCOUNTER — TELEPHONE (OUTPATIENT)
Dept: PSYCHOLOGY | Age: 63
End: 2023-10-17

## 2023-10-17 NOTE — TELEPHONE ENCOUNTER
Called pt as she did not show to scheduled appointment. Pt stated she made a wrong turn and would not be able to make appointment. She reported she would call PC office back to reschedule. No safety concerns noted or observed during call.

## 2023-11-12 DIAGNOSIS — E11.40 TYPE 2 DIABETES, CONTROLLED, WITH NEUROPATHY (HCC): ICD-10-CM

## 2023-11-12 DIAGNOSIS — F33.1 MODERATE EPISODE OF RECURRENT MAJOR DEPRESSIVE DISORDER (HCC): ICD-10-CM

## 2023-11-13 ENCOUNTER — HOSPITAL ENCOUNTER (OUTPATIENT)
Age: 63
Discharge: HOME | End: 2023-11-13
Payer: MEDICAID

## 2023-11-13 ENCOUNTER — OFFICE VISIT (OUTPATIENT)
Dept: INTERNAL MEDICINE CLINIC | Age: 63
End: 2023-11-13
Payer: MEDICAID

## 2023-11-13 ENCOUNTER — HOSPITAL ENCOUNTER (OUTPATIENT)
Dept: CT IMAGING | Age: 63
Discharge: HOME | End: 2023-11-13
Payer: MEDICAID

## 2023-11-13 ENCOUNTER — HOSPITAL ENCOUNTER (OUTPATIENT)
Age: 63
End: 2023-11-13
Payer: MEDICAID

## 2023-11-13 VITALS
DIASTOLIC BLOOD PRESSURE: 72 MMHG | BODY MASS INDEX: 27.41 KG/M2 | OXYGEN SATURATION: 98 % | WEIGHT: 169.8 LBS | HEART RATE: 76 BPM | SYSTOLIC BLOOD PRESSURE: 110 MMHG

## 2023-11-13 DIAGNOSIS — R20.0 RIGHT FACIAL NUMBNESS: ICD-10-CM

## 2023-11-13 DIAGNOSIS — M25.531 RIGHT WRIST PAIN: ICD-10-CM

## 2023-11-13 DIAGNOSIS — Z11.59 SCREENING FOR VIRAL DISEASE: ICD-10-CM

## 2023-11-13 DIAGNOSIS — R20.0 RIGHT LEG NUMBNESS: ICD-10-CM

## 2023-11-13 DIAGNOSIS — C95.11 CHRONIC LEUKEMIA IN REMISSION (HCC): ICD-10-CM

## 2023-11-13 DIAGNOSIS — R20.0 RIGHT ARM NUMBNESS: ICD-10-CM

## 2023-11-13 DIAGNOSIS — K21.9 GASTROESOPHAGEAL REFLUX DISEASE WITHOUT ESOPHAGITIS: ICD-10-CM

## 2023-11-13 DIAGNOSIS — R20.0 RIGHT FACIAL NUMBNESS: Primary | ICD-10-CM

## 2023-11-13 DIAGNOSIS — J45.30 MILD PERSISTENT ASTHMA WITHOUT COMPLICATION: ICD-10-CM

## 2023-11-13 LAB
DEPRECATED RDW RBC AUTO: 14 % (ref 12.4–15.4)
HBV SURFACE AB SERPL IA-ACNC: 96.72 MIU/ML
HCT VFR BLD AUTO: 39.2 % (ref 36–48)
HGB BLD-MCNC: 13.1 G/DL (ref 12–16)
MAGNESIUM SERPL-MCNC: 1.6 MG/DL (ref 1.8–2.4)
MCH RBC QN AUTO: 28.4 PG (ref 26–34)
MCHC RBC AUTO-ENTMCNC: 33.5 G/DL (ref 31–36)
MCV RBC AUTO: 84.7 FL (ref 80–100)
PLATELET # BLD AUTO: 215 K/UL (ref 135–450)
PMV BLD AUTO: 9 FL (ref 5–10.5)
RBC # BLD AUTO: 4.63 M/UL (ref 4–5.2)
WBC # BLD AUTO: 4.6 K/UL (ref 4–11)

## 2023-11-13 PROCEDURE — 85027 COMPLETE CBC AUTOMATED: CPT

## 2023-11-13 PROCEDURE — G8417 CALC BMI ABV UP PARAM F/U: HCPCS | Performed by: INTERNAL MEDICINE

## 2023-11-13 PROCEDURE — 3074F SYST BP LT 130 MM HG: CPT | Performed by: INTERNAL MEDICINE

## 2023-11-13 PROCEDURE — 36415 COLL VENOUS BLD VENIPUNCTURE: CPT

## 2023-11-13 PROCEDURE — G8482 FLU IMMUNIZE ORDER/ADMIN: HCPCS | Performed by: INTERNAL MEDICINE

## 2023-11-13 PROCEDURE — 70450 CT HEAD/BRAIN W/O DYE: CPT

## 2023-11-13 PROCEDURE — 3078F DIAST BP <80 MM HG: CPT | Performed by: INTERNAL MEDICINE

## 2023-11-13 PROCEDURE — 83735 ASSAY OF MAGNESIUM: CPT

## 2023-11-13 PROCEDURE — 86706 HEP B SURFACE ANTIBODY: CPT

## 2023-11-13 PROCEDURE — 90471 IMMUNIZATION ADMIN: CPT | Performed by: INTERNAL MEDICINE

## 2023-11-13 PROCEDURE — 1036F TOBACCO NON-USER: CPT | Performed by: INTERNAL MEDICINE

## 2023-11-13 PROCEDURE — 90674 CCIIV4 VAC NO PRSV 0.5 ML IM: CPT | Performed by: INTERNAL MEDICINE

## 2023-11-13 PROCEDURE — 99214 OFFICE O/P EST MOD 30 MIN: CPT | Performed by: INTERNAL MEDICINE

## 2023-11-13 PROCEDURE — G8427 DOCREV CUR MEDS BY ELIG CLIN: HCPCS | Performed by: INTERNAL MEDICINE

## 2023-11-13 PROCEDURE — 3017F COLORECTAL CA SCREEN DOC REV: CPT | Performed by: INTERNAL MEDICINE

## 2023-11-13 RX ORDER — INSULIN LISPRO 100 [IU]/ML
INJECTION, SOLUTION INTRAVENOUS; SUBCUTANEOUS
Qty: 30 ML | Refills: 1 | Status: SHIPPED | OUTPATIENT
Start: 2023-11-13

## 2023-11-13 RX ORDER — MONTELUKAST SODIUM 10 MG/1
10 TABLET ORAL DAILY
Qty: 90 TABLET | Refills: 1 | Status: SHIPPED | OUTPATIENT
Start: 2023-11-13

## 2023-11-13 RX ORDER — VITAMIN B COMPLEX
1 CAPSULE ORAL DAILY
COMMUNITY

## 2023-11-13 RX ORDER — INSULIN GLARGINE 100 [IU]/ML
INJECTION, SOLUTION SUBCUTANEOUS
Qty: 15 ML | Refills: 1 | Status: SHIPPED | OUTPATIENT
Start: 2023-11-13

## 2023-11-13 RX ORDER — TRAZODONE HYDROCHLORIDE 50 MG/1
75 TABLET ORAL NIGHTLY
Qty: 45 TABLET | Refills: 2 | Status: SHIPPED | OUTPATIENT
Start: 2023-11-13

## 2023-11-13 NOTE — PROGRESS NOTES
Mark Gong (:  1960) is a 58 y.o. female, here for evaluation of the following chief complaint(s):    Follow-up (Pt c/o acute (2 days ago) numbness on right side of face, right side of body as well)      ASSESSMENT/PLAN:  1. Right facial numbness  Restart aspirin 81 mg daily. Rule out [address]; Future stat  2. Right arm numbness  -     CT HEAD WO CONTRAST; Future  3. Right leg numbness  -     CT HEAD WO CONTRAST; Future  4. Right wrist pain  -     Aria Mustafa MD, Hand Surgery (Hand, Wrist, Upper Extremity), [facility]  5. Mild persistent asthma without complication  -   stable on Albuterol and zyrtec and  montelukast (SINGULAIR) 10 MG tablet; Take 1 tablet by mouth daily, Disp-90 tablet, R-1Normal  6. Chronic leukemia in remission ([address])  -     CBC; Future  Prior consult to dr Brenda Lorenzana  7. Gastroesophageal reflux disease without esophagitis  Stable on PPI  -     Magnesium; Future  8. Screening for viral disease  -     Hepatitis B Surface Antibody; Future    Other specified hypothyroidism  Stable on Levothyroxine. She will be due for repeat thyroid ultrasound in around      Primary insomnia  Her insomnia is well controlled with trazodone,   Type 2 diabetes mellitus with complications [facility])  Patient sees endocrinology. Remains on insulin and jardiance. Chronic ischemic heart disease  -     Saw Dr Abelardo Mast who adjusted meds - on metoprolol, asa/statin  CORONA (nonalcoholic steatohepatitis)  Had u/s of liver with GI     Diabetic polyneuropathy associated with type 2 diabetes mellitus   Stable on duloxetine and B  complex vitamins     Other hyperlipidemia  -    stable on atorvastatin    Anxiety and depression   Patient feels her symptoms are better controlled. Sees Dr. Luis Antonio Le.   Remains on duloxetine      Sleep apnea, unspecified type  -     [facility] - cpap advised  Return in about 3 months (around

## 2023-11-14 ENCOUNTER — TELEPHONE (OUTPATIENT)
Dept: PSYCHIATRY | Age: 63
End: 2023-11-14

## 2023-11-14 NOTE — TELEPHONE ENCOUNTER
Patient is calling for Rosetta Esparza in regards to wanting to schedule a sooner visit than December. Per patient she has been very depressed and dsnt feel like she can wait that long for an appointment. Patient also stated that she stopped taking her buPROPion (WELLBUTRIN XL) 150 MG extended release tablet about 3 weeks ago without consulting her PCP or Rosetta Esparza due to how depressed she was  but states that she has been feeling numbness on her RT side face and body recently as well. Patient states she had a cat scan and nothing came back abnormal.     Please advise.

## 2023-11-17 NOTE — TELEPHONE ENCOUNTER
Spoke with patient and she agreed to moving up her appointment.   We will see her on [email or web address]

## 2023-11-20 ENCOUNTER — TELEPHONE (OUTPATIENT)
Dept: INTERNAL MEDICINE CLINIC | Age: 63
End: 2023-11-20

## 2023-11-20 NOTE — TELEPHONE ENCOUNTER
Pt calling to speak with Dr. Kale Bahena regarding her CT scan results from 11/13/2023.       >>Dr. Kale Bahena: Let pt know the CT scan did not show a stroke. There were a few other findings I will call her about this week.     Please Advise Mariann: [phone]

## 2023-11-21 DIAGNOSIS — R20.0 RIGHT FACIAL NUMBNESS: Primary | ICD-10-CM

## 2023-11-21 DIAGNOSIS — G93.89 CEREBRAL VENTRICULOMEGALY: ICD-10-CM

## 2023-11-21 DIAGNOSIS — R20.0 RIGHT LEG NUMBNESS: ICD-10-CM

## 2023-11-21 DIAGNOSIS — R20.0 RIGHT ARM NUMBNESS: ICD-10-CM

## 2023-11-27 ENCOUNTER — OFFICE VISIT (OUTPATIENT)
Dept: ORTHOPEDIC SURGERY | Age: 63
End: 2023-11-27
Payer: MEDICAID

## 2023-11-27 VITALS — WEIGHT: 169 LBS | BODY MASS INDEX: 27.16 KG/M2 | RESPIRATION RATE: 16 BRPM | HEIGHT: 66 IN

## 2023-11-27 DIAGNOSIS — R20.0 RIGHT FACIAL NUMBNESS: Primary | ICD-10-CM

## 2023-11-27 DIAGNOSIS — M25.531 RIGHT WRIST PAIN: Primary | ICD-10-CM

## 2023-11-27 DIAGNOSIS — R93.0 ABNORMAL HEAD CT: ICD-10-CM

## 2023-11-27 PROCEDURE — G8482 FLU IMMUNIZE ORDER/ADMIN: HCPCS | Performed by: ORTHOPAEDIC SURGERY

## 2023-11-27 PROCEDURE — 99244 OFF/OP CNSLTJ NEW/EST MOD 40: CPT | Performed by: ORTHOPAEDIC SURGERY

## 2023-11-27 PROCEDURE — G8427 DOCREV CUR MEDS BY ELIG CLIN: HCPCS | Performed by: ORTHOPAEDIC SURGERY

## 2023-11-27 PROCEDURE — G8417 CALC BMI ABV UP PARAM F/U: HCPCS | Performed by: ORTHOPAEDIC SURGERY

## 2023-11-27 NOTE — PROGRESS NOTES
Ms. Fahad Steiner is a 58 y.o. right handed woman  who is seen today in Hand Surgical Consultation at the request of Shanta Wellington MD.    She is seen today regarding an injury occurring on September 19th, 2023. She reports injuring her right Wrist, having fall onto an outstretched hand   at Home. At the time of injury, there was not clear dislocation or malposition of the wrist.  She was seen for Emergency evaluation elsewhere, radiographs were obtained & she has been immobilized. By report, there  was not an associated skin injury. She has not sought or received any treatment for this injury since her ER visit. She reports moderate pain located in the Radial and Ulnar aspect of the wrist, no tenderness of the remaining fingers, hand, or elbow. She notes today, no neurologic symptoms in the Whole Hand. Symptoms show no change over time. She has been wearing a forearm based wrist orthosis near constantly since the ER visit. I have today reviewed with Fahad Steiner the clinically relevant, past medical history, medications, allergies,  family history, social history, and Review Of Systems & I have documented any details relevant to today's presenting complaints in my history above. Ms. Mauro Abrams's self-reported past medical history, medications, allergies,  family history, social history, and Review Of Systems have been scanned into the chart under the \"Media\" tab. Physical Exam:  Ms. Mauro Abrams's most recent vitals:  Vitals  Respirations: 16  Height: 167.6 cm (5' 6\")  Weight - Scale: 76.7 kg (169 lb)    She is well nourished, oriented to person, place & time. She demonstrates appropriate mood and affect as well as normal gait and station.     Skin: Normal in appearance, Normal Color, and Free of Lesions Bilaterally   Digital range of motion is without significant limitation bilaterally  Wrist range of motion is limited by pain and stiff from immobilization on the Right,

## 2023-11-27 NOTE — PATIENT INSTRUCTIONS
Hand Range of Motion Instructions      Dr. Mauro Last    Be cautions in resuming fulll activities and use of the hand for next 2 - 4 weeks. Perform the following exercises VIGOROUSLY at least four times a day. Exercises should be performed in the seated position with elbow on tabletop or other firm surface. If you cannot make these motions on your own, you may use other hand to assist in making these motions. Fully straighten fingers until hand is flat. Fully bend fingers until hand is in a full fist.   Bend wrist forward and backward (grasp hand around knuckles with other hand to do so). Rotate forearm so that your palm faces towards your face. Rotate forearm so that your palm faces away from your face (grasp hand around wrist with other hand to do so). Fully straighten elbow. Fully bend elbow. Continue light use of the hand progressing to more normal us as it feels comfortable to do so. In 2 - 4 weeks you may discontinue using the brace (if you were using one) and resume normal use of the hand and wrist if you have regained full and painless motion and function. If you are unable to achieve  full and painless motion and function over 4 weeks, please call the office at [phone] to schedule a follow-up appointment with Dr. Sarah Langley. Thank you for choosing [address] [facility] for your Hand and Upper Extremity needs. If we can be of any further assistance to you, please do not hesitate to contact us.     Office Phone Number:  [phone]  or  [phone]

## 2023-11-28 ENCOUNTER — OFFICE VISIT (OUTPATIENT)
Dept: NEUROLOGY | Age: 63
End: 2023-11-28
Payer: MEDICAID

## 2023-11-28 VITALS
WEIGHT: 175.04 LBS | HEART RATE: 83 BPM | DIASTOLIC BLOOD PRESSURE: 62 MMHG | BODY MASS INDEX: 28.25 KG/M2 | SYSTOLIC BLOOD PRESSURE: 121 MMHG

## 2023-11-28 DIAGNOSIS — E11.42 DIABETIC PERIPHERAL NEUROPATHY (HCC): ICD-10-CM

## 2023-11-28 DIAGNOSIS — E13.8 DM (DIABETES MELLITUS), SECONDARY, WITH COMPLICATIONS (HCC): ICD-10-CM

## 2023-11-28 DIAGNOSIS — G45.1 TIA INVOLVING LEFT INTERNAL CAROTID ARTERY: ICD-10-CM

## 2023-11-28 DIAGNOSIS — G91.0 HYDROCEPHALUS, COMMUNICATING (HCC): Primary | ICD-10-CM

## 2023-11-28 DIAGNOSIS — G31.84 MILD COGNITIVE IMPAIRMENT: ICD-10-CM

## 2023-11-28 PROCEDURE — 3051F HG A1C>EQUAL 7.0%<8.0%: CPT | Performed by: PSYCHIATRY & NEUROLOGY

## 2023-11-28 PROCEDURE — 2022F DILAT RTA XM EVC RTNOPTHY: CPT | Performed by: PSYCHIATRY & NEUROLOGY

## 2023-11-28 PROCEDURE — 1036F TOBACCO NON-USER: CPT | Performed by: PSYCHIATRY & NEUROLOGY

## 2023-11-28 PROCEDURE — 3078F DIAST BP <80 MM HG: CPT | Performed by: PSYCHIATRY & NEUROLOGY

## 2023-11-28 PROCEDURE — 3074F SYST BP LT 130 MM HG: CPT | Performed by: PSYCHIATRY & NEUROLOGY

## 2023-11-28 PROCEDURE — 3017F COLORECTAL CA SCREEN DOC REV: CPT | Performed by: PSYCHIATRY & NEUROLOGY

## 2023-11-28 PROCEDURE — G8427 DOCREV CUR MEDS BY ELIG CLIN: HCPCS | Performed by: PSYCHIATRY & NEUROLOGY

## 2023-11-28 PROCEDURE — 99204 OFFICE O/P NEW MOD 45 MIN: CPT | Performed by: PSYCHIATRY & NEUROLOGY

## 2023-11-28 PROCEDURE — G8417 CALC BMI ABV UP PARAM F/U: HCPCS | Performed by: PSYCHIATRY & NEUROLOGY

## 2023-11-28 PROCEDURE — G8482 FLU IMMUNIZE ORDER/ADMIN: HCPCS | Performed by: PSYCHIATRY & NEUROLOGY

## 2023-11-28 NOTE — PROGRESS NOTES
The patient is a 58y.o. years old female who  was referred by Veronica Goodman MD  for consultation regarding abnormal CT and new onset numbness. HPI:  The patient describes new onset numbness and tingling about 2 weeks ago. He describes right-sided face, arm and leg numbness and tingling for several hours. No headache or severe neck pain or chest pain, dysphagia or dysarthria. No triggers, relieving aggravating factors. She stopped taking aspirin 2 to 3 weeks prior to such incident for unclear reason. Patient was seen by PCP had a CT of the head which I did review. CT of the head showed diffuse hydrocephalus. She came today for consultation. She feels today back to her baseline. She denies any shuffling feet, freezing, gait ataxia or bladder incontinence. She described history of spontaneous subarachnoid hemorrhage back 20 years ago. No recent trauma or injury or febrile illness. No history of meningitis. She has not had a recent MRI of the brain or carotid Doppler. She does have chronic headaches and she had a CTV back in August of last year which was unremarkable. She is diabetic. She does have chronic hands and feet dysesthesias. Last A1c 7.1. She had normal B12. She takes statin and Cymbalta. She is on different blood pressure medications. Last echo from last year was unremarkable. Other review of system was relevant for chronic cognitive impairment and intermittent neck pain. ROS : A 10-14 system review of constitutional, cardiovascular, respiratory, GI, eyes, , ENT, musculoskeletal, endocrine, skin, SHEENT, genitourinary, psychiatric and neurologic systems was obtained and updated today and is unremarkable except as mentioned in my HPI        Exam:   Constitutional:   Vitals:    11/28/23 1241   BP: 121/62   Pulse: 83   Weight: 79.4 kg (175 lb 0.7 oz)       General appearance:  Normal development and appear in no acute distress.    Mental Status:   Oriented to

## 2023-11-29 ENCOUNTER — HOSPITAL ENCOUNTER (OUTPATIENT)
Dept: VASCULAR LAB | Age: 63
Discharge: HOME | End: 2023-11-29
Payer: MEDICAID

## 2023-11-29 DIAGNOSIS — G45.1 TIA INVOLVING LEFT INTERNAL CAROTID ARTERY: ICD-10-CM

## 2023-11-29 PROCEDURE — 93880 EXTRACRANIAL BILAT STUDY: CPT

## 2023-11-29 NOTE — PROGRESS NOTES
PSYCHIATRY PROGRESS NOTE    Michelle Hunt  1960  11/30/2023  Face to Face time: 30 minutes  PCP: Shweta Quintana MD    CC:   Chief Complaint   Patient presents with    Follow-up       Patient is a 58 y.o. female with a past medical history significant for leukemia at age 11, subarachnoid hemorrhage, CHRIS, hypertension, RLS, diabetes with polyneuropathy, coronary artery disease status post stenting, GERD, hypothyroidism, and arthritis who presents to the outpatient psychiatric clinic today for evaluation management of depression and anxiety. A:  Patient's presentation today is indicative of acute worsening of her depressive symptoms with the cessation of the bupropion from her medication list.  It does appear that her tinnitus is still present and not likely to be related to the Wellbutrin as she thought. That being said we will still attempt to change her medication regimen to provide her with further support. Diagnosis:  Major depressive disorder, recurrent moderate  Generalized anxiety disorder  Concerns for a mild cognitive impairment    P:   1. Continue current medications of duloxetine 90 mg daily and trazodone 75 mg nightly  2. Add atomoxetine 18 mg daily for treatment of cognitive impairments. Patient was cautioned regarding adverse effects of the medications including headaches, abdominal pain, nausea, appetite suppression, xerostomia, insomnia, and drowsiness. Medication Monitoring:    - PDMP reviewed: No current prescriptions     Follow-up: 6 weeks    Safety: Pt was counseled on the potential for increased suicidal ideations and advised on potential options for dealing with these including hotlines, calling the office, or going to the nearest emergency room. __________________________________________________________________________    S:   Patient identified that she has been feeling more down lately.   She notes that this coincided with coming off of the bupropion from

## 2023-11-30 ENCOUNTER — OFFICE VISIT (OUTPATIENT)
Dept: PSYCHIATRY | Age: 63
End: 2023-11-30
Payer: MEDICAID

## 2023-11-30 VITALS
WEIGHT: 172.8 LBS | DIASTOLIC BLOOD PRESSURE: 70 MMHG | HEIGHT: 66 IN | BODY MASS INDEX: 27.77 KG/M2 | HEART RATE: 86 BPM | SYSTOLIC BLOOD PRESSURE: 116 MMHG

## 2023-11-30 DIAGNOSIS — F33.1 MODERATE EPISODE OF RECURRENT MAJOR DEPRESSIVE DISORDER (HCC): Primary | ICD-10-CM

## 2023-11-30 DIAGNOSIS — R41.840 ATTENTION OR CONCENTRATION DEFICIT: ICD-10-CM

## 2023-11-30 DIAGNOSIS — F41.1 GAD (GENERALIZED ANXIETY DISORDER): ICD-10-CM

## 2023-11-30 PROCEDURE — 3078F DIAST BP <80 MM HG: CPT | Performed by: STUDENT IN AN ORGANIZED HEALTH CARE EDUCATION/TRAINING PROGRAM

## 2023-11-30 PROCEDURE — G8417 CALC BMI ABV UP PARAM F/U: HCPCS | Performed by: STUDENT IN AN ORGANIZED HEALTH CARE EDUCATION/TRAINING PROGRAM

## 2023-11-30 PROCEDURE — 3074F SYST BP LT 130 MM HG: CPT | Performed by: STUDENT IN AN ORGANIZED HEALTH CARE EDUCATION/TRAINING PROGRAM

## 2023-11-30 PROCEDURE — 3017F COLORECTAL CA SCREEN DOC REV: CPT | Performed by: STUDENT IN AN ORGANIZED HEALTH CARE EDUCATION/TRAINING PROGRAM

## 2023-11-30 PROCEDURE — G8482 FLU IMMUNIZE ORDER/ADMIN: HCPCS | Performed by: STUDENT IN AN ORGANIZED HEALTH CARE EDUCATION/TRAINING PROGRAM

## 2023-11-30 PROCEDURE — G8427 DOCREV CUR MEDS BY ELIG CLIN: HCPCS | Performed by: STUDENT IN AN ORGANIZED HEALTH CARE EDUCATION/TRAINING PROGRAM

## 2023-11-30 PROCEDURE — 1036F TOBACCO NON-USER: CPT | Performed by: STUDENT IN AN ORGANIZED HEALTH CARE EDUCATION/TRAINING PROGRAM

## 2023-11-30 PROCEDURE — 99214 OFFICE O/P EST MOD 30 MIN: CPT | Performed by: STUDENT IN AN ORGANIZED HEALTH CARE EDUCATION/TRAINING PROGRAM

## 2023-11-30 RX ORDER — DULOXETIN HYDROCHLORIDE 60 MG/1
60 CAPSULE, DELAYED RELEASE ORAL DAILY
Qty: 90 CAPSULE | Refills: 1 | Status: SHIPPED | OUTPATIENT
Start: 2023-11-30

## 2023-11-30 RX ORDER — ATOMOXETINE 18 MG/1
18 CAPSULE ORAL DAILY
Qty: 30 CAPSULE | Refills: 2 | Status: SHIPPED | OUTPATIENT
Start: 2023-11-30

## 2023-11-30 RX ORDER — DULOXETIN HYDROCHLORIDE 30 MG/1
30 CAPSULE, DELAYED RELEASE ORAL DAILY
Qty: 90 CAPSULE | Refills: 1 | Status: SHIPPED | OUTPATIENT
Start: 2023-11-30

## 2023-11-30 ASSESSMENT — ANXIETY QUESTIONNAIRES
4. TROUBLE RELAXING: 2
7. FEELING AFRAID AS IF SOMETHING AWFUL MIGHT HAPPEN: 1
GAD7 TOTAL SCORE: 12
2. NOT BEING ABLE TO STOP OR CONTROL WORRYING: 2
6. BECOMING EASILY ANNOYED OR IRRITABLE: 2
5. BEING SO RESTLESS THAT IT IS HARD TO SIT STILL: 1
3. WORRYING TOO MUCH ABOUT DIFFERENT THINGS: 2
IF YOU CHECKED OFF ANY PROBLEMS ON THIS QUESTIONNAIRE, HOW DIFFICULT HAVE THESE PROBLEMS MADE IT FOR YOU TO DO YOUR WORK, TAKE CARE OF THINGS AT HOME, OR GET ALONG WITH OTHER PEOPLE: VERY DIFFICULT
1. FEELING NERVOUS, ANXIOUS, OR ON EDGE: 2

## 2023-11-30 ASSESSMENT — PATIENT HEALTH QUESTIONNAIRE - PHQ9
SUM OF ALL RESPONSES TO PHQ QUESTIONS 1-9: 18
2. FEELING DOWN, DEPRESSED OR HOPELESS: 2
1. LITTLE INTEREST OR PLEASURE IN DOING THINGS: 2
7. TROUBLE CONCENTRATING ON THINGS, SUCH AS READING THE NEWSPAPER OR WATCHING TELEVISION: 3
SUM OF ALL RESPONSES TO PHQ QUESTIONS 1-9: 18
9. THOUGHTS THAT YOU WOULD BE BETTER OFF DEAD, OR OF HURTING YOURSELF: 0
SUM OF ALL RESPONSES TO PHQ9 QUESTIONS 1 & 2: 4
10. IF YOU CHECKED OFF ANY PROBLEMS, HOW DIFFICULT HAVE THESE PROBLEMS MADE IT FOR YOU TO DO YOUR WORK, TAKE CARE OF THINGS AT HOME, OR GET ALONG WITH OTHER PEOPLE: 2
3. TROUBLE FALLING OR STAYING ASLEEP: 2
SUM OF ALL RESPONSES TO PHQ QUESTIONS 1-9: 18
8. MOVING OR SPEAKING SO SLOWLY THAT OTHER PEOPLE COULD HAVE NOTICED. OR THE OPPOSITE, BEING SO FIGETY OR RESTLESS THAT YOU HAVE BEEN MOVING AROUND A LOT MORE THAN USUAL: 2
4. FEELING TIRED OR HAVING LITTLE ENERGY: 3
5. POOR APPETITE OR OVEREATING: 2
SUM OF ALL RESPONSES TO PHQ QUESTIONS 1-9: 18
6. FEELING BAD ABOUT YOURSELF - OR THAT YOU ARE A FAILURE OR HAVE LET YOURSELF OR YOUR FAMILY DOWN: 2

## 2023-11-30 ASSESSMENT — ENCOUNTER SYMPTOMS
EYES NEGATIVE: 1
GASTROINTESTINAL NEGATIVE: 1
ALLERGIC/IMMUNOLOGIC NEGATIVE: 1
RESPIRATORY NEGATIVE: 1

## 2023-12-02 ENCOUNTER — HOSPITAL ENCOUNTER (OUTPATIENT)
Dept: MRI IMAGING | Age: 63
Discharge: HOME | End: 2023-12-02
Payer: MEDICAID

## 2023-12-02 DIAGNOSIS — G45.1 TIA INVOLVING LEFT INTERNAL CAROTID ARTERY: ICD-10-CM

## 2023-12-02 PROCEDURE — 70551 MRI BRAIN STEM W/O DYE: CPT

## 2023-12-05 ENCOUNTER — TELEPHONE (OUTPATIENT)
Dept: NEUROLOGY | Age: 63
End: 2023-12-05

## 2023-12-05 NOTE — TELEPHONE ENCOUNTER
Pt is calking to get MRI results. Please call pt back he had it done at [facility] on Saturday.

## 2024-01-15 ENCOUNTER — TELEMEDICINE (OUTPATIENT)
Dept: PSYCHIATRY | Age: 64
End: 2024-01-15
Payer: MEDICAID

## 2024-01-15 DIAGNOSIS — F33.1 MODERATE EPISODE OF RECURRENT MAJOR DEPRESSIVE DISORDER (HCC): Primary | ICD-10-CM

## 2024-01-15 DIAGNOSIS — G31.84 MILD COGNITIVE IMPAIRMENT: ICD-10-CM

## 2024-01-15 DIAGNOSIS — F41.1 GAD (GENERALIZED ANXIETY DISORDER): ICD-10-CM

## 2024-01-15 PROCEDURE — 99213 OFFICE O/P EST LOW 20 MIN: CPT | Performed by: STUDENT IN AN ORGANIZED HEALTH CARE EDUCATION/TRAINING PROGRAM

## 2024-01-15 PROCEDURE — G8482 FLU IMMUNIZE ORDER/ADMIN: HCPCS | Performed by: STUDENT IN AN ORGANIZED HEALTH CARE EDUCATION/TRAINING PROGRAM

## 2024-01-15 PROCEDURE — G8427 DOCREV CUR MEDS BY ELIG CLIN: HCPCS | Performed by: STUDENT IN AN ORGANIZED HEALTH CARE EDUCATION/TRAINING PROGRAM

## 2024-01-15 PROCEDURE — G8417 CALC BMI ABV UP PARAM F/U: HCPCS | Performed by: STUDENT IN AN ORGANIZED HEALTH CARE EDUCATION/TRAINING PROGRAM

## 2024-01-15 PROCEDURE — 3017F COLORECTAL CA SCREEN DOC REV: CPT | Performed by: STUDENT IN AN ORGANIZED HEALTH CARE EDUCATION/TRAINING PROGRAM

## 2024-01-15 PROCEDURE — 1036F TOBACCO NON-USER: CPT | Performed by: STUDENT IN AN ORGANIZED HEALTH CARE EDUCATION/TRAINING PROGRAM

## 2024-01-15 ASSESSMENT — PATIENT HEALTH QUESTIONNAIRE - PHQ9
3. TROUBLE FALLING OR STAYING ASLEEP: 2
9. THOUGHTS THAT YOU WOULD BE BETTER OFF DEAD, OR OF HURTING YOURSELF: 0
2. FEELING DOWN, DEPRESSED OR HOPELESS: 1
SUM OF ALL RESPONSES TO PHQ9 QUESTIONS 1 & 2: 2
SUM OF ALL RESPONSES TO PHQ QUESTIONS 1-9: 7
5. POOR APPETITE OR OVEREATING: 0
8. MOVING OR SPEAKING SO SLOWLY THAT OTHER PEOPLE COULD HAVE NOTICED. OR THE OPPOSITE, BEING SO FIGETY OR RESTLESS THAT YOU HAVE BEEN MOVING AROUND A LOT MORE THAN USUAL: 0
SUM OF ALL RESPONSES TO PHQ QUESTIONS 1-9: 7
4. FEELING TIRED OR HAVING LITTLE ENERGY: 3
SUM OF ALL RESPONSES TO PHQ QUESTIONS 1-9: 7
1. LITTLE INTEREST OR PLEASURE IN DOING THINGS: 1
10. IF YOU CHECKED OFF ANY PROBLEMS, HOW DIFFICULT HAVE THESE PROBLEMS MADE IT FOR YOU TO DO YOUR WORK, TAKE CARE OF THINGS AT HOME, OR GET ALONG WITH OTHER PEOPLE: 1
6. FEELING BAD ABOUT YOURSELF - OR THAT YOU ARE A FAILURE OR HAVE LET YOURSELF OR YOUR FAMILY DOWN: 0
SUM OF ALL RESPONSES TO PHQ QUESTIONS 1-9: 7
7. TROUBLE CONCENTRATING ON THINGS, SUCH AS READING THE NEWSPAPER OR WATCHING TELEVISION: 0

## 2024-01-15 ASSESSMENT — ANXIETY QUESTIONNAIRES
6. BECOMING EASILY ANNOYED OR IRRITABLE: 1
IF YOU CHECKED OFF ANY PROBLEMS ON THIS QUESTIONNAIRE, HOW DIFFICULT HAVE THESE PROBLEMS MADE IT FOR YOU TO DO YOUR WORK, TAKE CARE OF THINGS AT HOME, OR GET ALONG WITH OTHER PEOPLE: SOMEWHAT DIFFICULT
GAD7 TOTAL SCORE: 5
7. FEELING AFRAID AS IF SOMETHING AWFUL MIGHT HAPPEN: 0
5. BEING SO RESTLESS THAT IT IS HARD TO SIT STILL: 0
1. FEELING NERVOUS, ANXIOUS, OR ON EDGE: 1
3. WORRYING TOO MUCH ABOUT DIFFERENT THINGS: 1
2. NOT BEING ABLE TO STOP OR CONTROL WORRYING: 1
4. TROUBLE RELAXING: 1

## 2024-01-15 ASSESSMENT — ENCOUNTER SYMPTOMS
ALLERGIC/IMMUNOLOGIC NEGATIVE: 1
EYES NEGATIVE: 1
GASTROINTESTINAL NEGATIVE: 1
RESPIRATORY NEGATIVE: 1

## 2024-01-15 NOTE — PROGRESS NOTES
Patient presents for an VV.  Patient states that the new medication she started has caused her to become very irritable.  She states that she is very snappy and angry.  She does admit to having more energy.    PHQ:7  KARIE:5  
stimuli  Associations:   Intact  Attention/Concentration:   Intact  Orientation:    Alert and oriented x4  Memory:   Intact  Fund of Knowledge:    Generally appropriate for age and education  Insight/Judgement:   Intact/intact            1/15/2024     3:11 PM 11/30/2023     1:43 PM   PHQ-9 Questionaire   Little interest or pleasure in doing things 1 2   Feeling down, depressed, or hopeless 1 2   Trouble falling or staying asleep, or sleeping too much 2 2   Feeling tired or having little energy 3 3   Poor appetite or overeating 0 2   Feeling bad about yourself - or that you are a failure or have let yourself or your family down 0 2   Trouble concentrating on things, such as reading the newspaper or watching television 0 3   Moving or speaking so slowly that other people could have noticed. Or the opposite - being so fidgety or restless that you have been moving around a lot more than usual 0 2   Thoughts that you would be better off dead, or of hurting yourself in some way 0 0   PHQ-9 Total Score 7 18   If you checked off any problems, how difficult have these problems made it for you to do your work, take care of things at home, or get along with other people? 1 2         1/15/2024     3:00 PM 11/30/2023     1:00 PM   KARIE-7 SCREENING   Feeling nervous, anxious, or on edge Several days More than half the days   Not being able to stop or control worrying Several days More than half the days   Worrying too much about different things Several days More than half the days   Trouble relaxing Several days More than half the days   Being so restless that it is hard to sit still Not at all Several days   Becoming easily annoyed or irritable Several days More than half the days   Feeling afraid as if something awful might happen Not at all Several days   KARIE-7 Total Score 5 12   How difficult have these problems made it for you to do your work, take care of things at home, or get along with other people? Somewhat difficult

## 2024-01-16 RX ORDER — METOPROLOL SUCCINATE 25 MG/1
25 TABLET, EXTENDED RELEASE ORAL DAILY
Qty: 90 TABLET | Refills: 3 | Status: SHIPPED | OUTPATIENT
Start: 2024-01-16

## 2024-01-26 DIAGNOSIS — E11.65 POORLY CONTROLLED TYPE 2 DIABETES MELLITUS WITH NEUROPATHY (HCC): ICD-10-CM

## 2024-01-26 DIAGNOSIS — E78.49 OTHER HYPERLIPIDEMIA: ICD-10-CM

## 2024-01-26 DIAGNOSIS — E04.2 MULTIPLE THYROID NODULES: ICD-10-CM

## 2024-01-26 DIAGNOSIS — E11.40 POORLY CONTROLLED TYPE 2 DIABETES MELLITUS WITH NEUROPATHY (HCC): ICD-10-CM

## 2024-01-26 DIAGNOSIS — E03.9 ACQUIRED HYPOTHYROIDISM: ICD-10-CM

## 2024-01-27 LAB
ALBUMIN SERPL-MCNC: 4.3 G/DL (ref 3.4–5)
ALBUMIN/GLOB SERPL: 1.9 {RATIO} (ref 1.1–2.2)
ALP SERPL-CCNC: 104 U/L (ref 40–129)
ALT SERPL-CCNC: 32 U/L (ref 10–40)
ANION GAP SERPL CALCULATED.3IONS-SCNC: 13 MMOL/L (ref 3–16)
AST SERPL-CCNC: 24 U/L (ref 15–37)
BILIRUB SERPL-MCNC: 0.4 MG/DL (ref 0–1)
BUN SERPL-MCNC: 18 MG/DL (ref 7–20)
CALCIUM SERPL-MCNC: 8.7 MG/DL (ref 8.3–10.6)
CHLORIDE SERPL-SCNC: 105 MMOL/L (ref 99–110)
CHOLEST SERPL-MCNC: 171 MG/DL (ref 0–199)
CO2 SERPL-SCNC: 24 MMOL/L (ref 21–32)
CREAT SERPL-MCNC: 1 MG/DL (ref 0.6–1.2)
EST. AVERAGE GLUCOSE BLD GHB EST-MCNC: 177.2 MG/DL
GFR SERPLBLD CREATININE-BSD FMLA CKD-EPI: >60 ML/MIN/{1.73_M2}
GLUCOSE SERPL-MCNC: 191 MG/DL (ref 70–99)
HBA1C MFR BLD: 7.8 %
HDLC SERPL-MCNC: 63 MG/DL (ref 40–60)
LDL CHOLESTEROL CALCULATED: 82 MG/DL
POTASSIUM SERPL-SCNC: 4.4 MMOL/L (ref 3.5–5.1)
PROT SERPL-MCNC: 6.6 G/DL (ref 6.4–8.2)
SODIUM SERPL-SCNC: 142 MMOL/L (ref 136–145)
T3FREE SERPL-MCNC: 2.6 PG/ML (ref 2.3–4.2)
T4 FREE SERPL-MCNC: 1.3 NG/DL (ref 0.9–1.8)
TRIGL SERPL-MCNC: 131 MG/DL (ref 0–150)
TSH SERPL DL<=0.005 MIU/L-ACNC: 3.19 UIU/ML (ref 0.27–4.2)
VLDLC SERPL CALC-MCNC: 26 MG/DL

## 2024-01-31 ENCOUNTER — OFFICE VISIT (OUTPATIENT)
Dept: ENDOCRINOLOGY | Age: 64
End: 2024-01-31

## 2024-01-31 VITALS
DIASTOLIC BLOOD PRESSURE: 65 MMHG | BODY MASS INDEX: 27.97 KG/M2 | WEIGHT: 174 LBS | RESPIRATION RATE: 14 BRPM | HEIGHT: 66 IN | TEMPERATURE: 98 F | HEART RATE: 76 BPM | OXYGEN SATURATION: 99 % | SYSTOLIC BLOOD PRESSURE: 121 MMHG

## 2024-01-31 DIAGNOSIS — E78.49 OTHER HYPERLIPIDEMIA: ICD-10-CM

## 2024-01-31 DIAGNOSIS — I25.10 CORONARY ARTERY DISEASE INVOLVING NATIVE CORONARY ARTERY OF NATIVE HEART WITHOUT ANGINA PECTORIS: ICD-10-CM

## 2024-01-31 DIAGNOSIS — G47.33 OSA (OBSTRUCTIVE SLEEP APNEA): ICD-10-CM

## 2024-01-31 DIAGNOSIS — E03.9 ACQUIRED HYPOTHYROIDISM: ICD-10-CM

## 2024-01-31 DIAGNOSIS — E11.40 POORLY CONTROLLED TYPE 2 DIABETES MELLITUS WITH NEUROPATHY (HCC): Primary | ICD-10-CM

## 2024-01-31 DIAGNOSIS — I10 PRIMARY HYPERTENSION: ICD-10-CM

## 2024-01-31 DIAGNOSIS — E04.2 MULTIPLE THYROID NODULES: ICD-10-CM

## 2024-01-31 DIAGNOSIS — E11.65 POORLY CONTROLLED TYPE 2 DIABETES MELLITUS WITH NEUROPATHY (HCC): Primary | ICD-10-CM

## 2024-01-31 DIAGNOSIS — K75.81 NASH (NONALCOHOLIC STEATOHEPATITIS): ICD-10-CM

## 2024-01-31 DIAGNOSIS — E66.3 OVERWEIGHT (BMI 25.0-29.9): ICD-10-CM

## 2024-01-31 RX ORDER — INSULIN GLARGINE 100 [IU]/ML
INJECTION, SOLUTION SUBCUTANEOUS
Qty: 45 ML | Refills: 1 | Status: SHIPPED | OUTPATIENT
Start: 2024-01-31

## 2024-01-31 RX ORDER — INSULIN LISPRO 100 [IU]/ML
INJECTION, SOLUTION INTRAVENOUS; SUBCUTANEOUS
Qty: 135 ML | Refills: 1 | Status: SHIPPED | OUTPATIENT
Start: 2024-01-31

## 2024-01-31 RX ORDER — LEVOTHYROXINE SODIUM 0.07 MG/1
75 TABLET ORAL DAILY
Qty: 90 TABLET | Refills: 1 | Status: SHIPPED | OUTPATIENT
Start: 2024-01-31

## 2024-01-31 RX ORDER — SEMAGLUTIDE 0.68 MG/ML
INJECTION, SOLUTION SUBCUTANEOUS
Qty: 3 ML | Refills: 1 | Status: SHIPPED | OUTPATIENT
Start: 2024-01-31 | End: 2024-01-31 | Stop reason: ALTCHOICE

## 2024-01-31 RX ORDER — INSULIN LISPRO 100 [IU]/ML
INJECTION, SOLUTION INTRAVENOUS; SUBCUTANEOUS
Qty: 30 ML | Refills: 1 | Status: SHIPPED | OUTPATIENT
Start: 2024-01-31 | End: 2024-01-31 | Stop reason: SDUPTHER

## 2024-01-31 RX ORDER — INSULIN GLARGINE 100 [IU]/ML
INJECTION, SOLUTION SUBCUTANEOUS
Qty: 15 ML | Refills: 1 | Status: SHIPPED | OUTPATIENT
Start: 2024-01-31 | End: 2024-01-31 | Stop reason: SDUPTHER

## 2024-01-31 NOTE — PROGRESS NOTES
diabetes mellitus (HCC)    GERD (gastroesophageal reflux disease)    Leukemia (HCC)    Hypothyroidism    CORONA (nonalcoholic steatohepatitis)    Other hyperlipidemia    Type 2 diabetes mellitus with complications (HCC)    Restless leg syndrome    Mild intermittent asthma without complication    Balance disorder    CHRIS (obstructive sleep apnea)    Coronary artery disease involving native coronary artery of native heart without angina pectoris    Primary hypertension    Palpitations    Moderate episode of recurrent major depressive disorder (HCC)    KARIE (generalized anxiety disorder)    Type 2 diabetes mellitus, uncontrolled, with neuropathy    Overweight (BMI 25.0-29.9)    Multiple thyroid nodules    Neck mass    Type 2 diabetes, controlled, with neuropathy (HCC)    Hydrocephalus, communicating (HCC)    TIA involving left internal carotid artery    DM (diabetes mellitus), secondary, with complications (HCC)    Mild cognitive impairment    Diabetic peripheral neuropathy (HCC)     Past Surgical History:   Procedure Laterality Date    ABDOMINAL EXPLORATION SURGERY      CHOLECYSTECTOMY, LAPAROSCOPIC      COLONOSCOPY N/A 05/27/2022    fu 10 yrs, COLONOSCOPY performed by Jonnathan Wilson MD at [facility] ENDOSCOPY    HYSTERECTOMY (CERVIX STATUS UNKNOWN)  1986    LIVER BIOPSY      OTHER SURGICAL HISTORY Right     radial head fx    MALLORY AND BSO (CERVIX REMOVED)      2 surgeries, 1985, 2001    UPPER GASTROINTESTINAL ENDOSCOPY N/A 05/27/2022    EGD BIOPSY performed by Jonnathan Wilson MD at [facility] ENDOSCOPY     Social History     Socioeconomic History    Marital status:      Spouse name: Not on file    Number of children: 2    Years of education: Not on file    Highest education level: Bachelor's degree (e.g., BA, AB, BS)   Occupational History    Occupation: retail work, retired nurse   Tobacco Use    Smoking status: Never     Passive exposure: Past    Smokeless tobacco: Never   Vaping Use    Vaping Use: Never used   Substance

## 2024-02-08 DIAGNOSIS — E78.49 OTHER HYPERLIPIDEMIA: ICD-10-CM

## 2024-02-08 RX ORDER — ATORVASTATIN CALCIUM 40 MG/1
40 TABLET, FILM COATED ORAL DAILY
Qty: 90 TABLET | Refills: 1 | Status: SHIPPED | OUTPATIENT
Start: 2024-02-08

## 2024-02-08 NOTE — TELEPHONE ENCOUNTER
Medication:   Requested Prescriptions     Pending Prescriptions Disp Refills    atorvastatin (LIPITOR) 40 MG tablet 90 tablet 1     Sig: Take 1 tablet by mouth daily     Last Filled:  8/21/23    Last appt: 11/13/2023   Next appt: Visit date not found    Last Lipid:   Lab Results   Component Value Date/Time    CHOL 194 08/30/2022 11:05 AM    TRIG 146 08/30/2022 11:05 AM    HDL 63 01/26/2024 10:05 AM    LDLCALC 82 01/26/2024 10:05 AM

## 2024-02-18 DIAGNOSIS — F33.1 MODERATE EPISODE OF RECURRENT MAJOR DEPRESSIVE DISORDER (HCC): ICD-10-CM

## 2024-02-19 RX ORDER — DULOXETIN HYDROCHLORIDE 30 MG/1
30 CAPSULE, DELAYED RELEASE ORAL DAILY
Qty: 90 CAPSULE | Refills: 1 | Status: SHIPPED | OUTPATIENT
Start: 2024-02-19

## 2024-02-19 RX ORDER — TRAZODONE HYDROCHLORIDE 50 MG/1
75 TABLET ORAL NIGHTLY
Qty: 45 TABLET | Refills: 2 | Status: SHIPPED | OUTPATIENT
Start: 2024-02-19

## 2024-02-19 RX ORDER — DULOXETIN HYDROCHLORIDE 60 MG/1
60 CAPSULE, DELAYED RELEASE ORAL DAILY
Qty: 90 CAPSULE | Refills: 1 | Status: SHIPPED | OUTPATIENT
Start: 2024-02-19

## 2024-02-20 NOTE — PROGRESS NOTES
Calculated 2024 82  <100 mg/dL Final    VLDL Cholesterol Calculated 2024 26  Not Established mg/dL Final    T3, Free 2024 2.6  2.3 - 4.2 pg/mL Final    T4 Free 2024 1.3  0.9 - 1.8 ng/dL Final    TSH 2024 3.19  0.27 - 4.20 uIU/mL Final       EK2022 QTc 407          Deon John MD  Psychiatrist

## 2024-02-26 ENCOUNTER — TELEMEDICINE (OUTPATIENT)
Dept: PSYCHIATRY | Age: 64
End: 2024-02-26
Payer: MEDICAID

## 2024-02-26 DIAGNOSIS — G31.84 MILD COGNITIVE IMPAIRMENT: ICD-10-CM

## 2024-02-26 DIAGNOSIS — F33.1 MODERATE EPISODE OF RECURRENT MAJOR DEPRESSIVE DISORDER (HCC): ICD-10-CM

## 2024-02-26 DIAGNOSIS — F41.1 GAD (GENERALIZED ANXIETY DISORDER): Primary | ICD-10-CM

## 2024-02-26 PROCEDURE — G8482 FLU IMMUNIZE ORDER/ADMIN: HCPCS | Performed by: STUDENT IN AN ORGANIZED HEALTH CARE EDUCATION/TRAINING PROGRAM

## 2024-02-26 PROCEDURE — 3017F COLORECTAL CA SCREEN DOC REV: CPT | Performed by: STUDENT IN AN ORGANIZED HEALTH CARE EDUCATION/TRAINING PROGRAM

## 2024-02-26 PROCEDURE — 1036F TOBACCO NON-USER: CPT | Performed by: STUDENT IN AN ORGANIZED HEALTH CARE EDUCATION/TRAINING PROGRAM

## 2024-02-26 PROCEDURE — G8417 CALC BMI ABV UP PARAM F/U: HCPCS | Performed by: STUDENT IN AN ORGANIZED HEALTH CARE EDUCATION/TRAINING PROGRAM

## 2024-02-26 PROCEDURE — G8427 DOCREV CUR MEDS BY ELIG CLIN: HCPCS | Performed by: STUDENT IN AN ORGANIZED HEALTH CARE EDUCATION/TRAINING PROGRAM

## 2024-02-26 PROCEDURE — 99214 OFFICE O/P EST MOD 30 MIN: CPT | Performed by: STUDENT IN AN ORGANIZED HEALTH CARE EDUCATION/TRAINING PROGRAM

## 2024-02-26 RX ORDER — TRAZODONE HYDROCHLORIDE 100 MG/1
100 TABLET ORAL NIGHTLY
Qty: 30 TABLET | Refills: 2 | Status: SHIPPED | OUTPATIENT
Start: 2024-02-26

## 2024-02-26 ASSESSMENT — ANXIETY QUESTIONNAIRES
7. FEELING AFRAID AS IF SOMETHING AWFUL MIGHT HAPPEN: 1
5. BEING SO RESTLESS THAT IT IS HARD TO SIT STILL: 1
1. FEELING NERVOUS, ANXIOUS, OR ON EDGE: 2
GAD7 TOTAL SCORE: 11
IF YOU CHECKED OFF ANY PROBLEMS ON THIS QUESTIONNAIRE, HOW DIFFICULT HAVE THESE PROBLEMS MADE IT FOR YOU TO DO YOUR WORK, TAKE CARE OF THINGS AT HOME, OR GET ALONG WITH OTHER PEOPLE: SOMEWHAT DIFFICULT
2. NOT BEING ABLE TO STOP OR CONTROL WORRYING: 2
6. BECOMING EASILY ANNOYED OR IRRITABLE: 1
4. TROUBLE RELAXING: 2
3. WORRYING TOO MUCH ABOUT DIFFERENT THINGS: 2

## 2024-02-26 ASSESSMENT — PATIENT HEALTH QUESTIONNAIRE - PHQ9
10. IF YOU CHECKED OFF ANY PROBLEMS, HOW DIFFICULT HAVE THESE PROBLEMS MADE IT FOR YOU TO DO YOUR WORK, TAKE CARE OF THINGS AT HOME, OR GET ALONG WITH OTHER PEOPLE: 1
1. LITTLE INTEREST OR PLEASURE IN DOING THINGS: 0
SUM OF ALL RESPONSES TO PHQ QUESTIONS 1-9: 12
SUM OF ALL RESPONSES TO PHQ QUESTIONS 1-9: 12
9. THOUGHTS THAT YOU WOULD BE BETTER OFF DEAD, OR OF HURTING YOURSELF: 0
4. FEELING TIRED OR HAVING LITTLE ENERGY: 3
3. TROUBLE FALLING OR STAYING ASLEEP: 3
SUM OF ALL RESPONSES TO PHQ QUESTIONS 1-9: 12
2. FEELING DOWN, DEPRESSED OR HOPELESS: 1
SUM OF ALL RESPONSES TO PHQ9 QUESTIONS 1 & 2: 1
8. MOVING OR SPEAKING SO SLOWLY THAT OTHER PEOPLE COULD HAVE NOTICED. OR THE OPPOSITE, BEING SO FIGETY OR RESTLESS THAT YOU HAVE BEEN MOVING AROUND A LOT MORE THAN USUAL: 1
7. TROUBLE CONCENTRATING ON THINGS, SUCH AS READING THE NEWSPAPER OR WATCHING TELEVISION: 1
5. POOR APPETITE OR OVEREATING: 2
SUM OF ALL RESPONSES TO PHQ QUESTIONS 1-9: 12
6. FEELING BAD ABOUT YOURSELF - OR THAT YOU ARE A FAILURE OR HAVE LET YOURSELF OR YOUR FAMILY DOWN: 1

## 2024-04-03 NOTE — PROGRESS NOTES
PSYCHIATRY PROGRESS NOTE    Malaika Abrams  1960  04/08/2024  Face to Face time: 20 minutes  PCP: Ana Luisa Terry MD    CC:   Chief Complaint   Patient presents with    Follow-up       Patient is a 64 y.o. female with a past medical history significant for leukemia at age 15, subarachnoid hemorrhage, CHRIS, hypertension, RLS, diabetes with polyneuropathy, coronary artery disease status post stenting, GERD, hypothyroidism, and arthritis who presents to the outpatient psychiatric clinic today for evaluation management of depression and anxiety.    Malaika Abrams, was evaluated through a synchronous (real-time) audio-video encounter. The patient (or guardian if applicable) is aware that this is a billable service, which includes applicable co-pays. This Virtual Visit was conducted with patient's (and/or legal guardian's) consent. Patient identification was verified, and a caregiver was present when appropriate.   The patient was located at Home: [address] [place], [place] [address]  [place] [id number]  Provider was located at Facility (Appt Dept): [address] [place] [address]  Confirm you are appropriately licensed, registered, or certified to deliver care in the state where the patient is located as indicated above. If you are not or unsure, please re-schedule the visit: Yes, I confirm.        A:  Patient's presentation today is indicative of general improvement in her sleep issues with the current structure of medications.  There still appears to be room to adjust things to provide her with further support.  We will continue to follow and adjust.    Diagnosis:  Major depressive disorder, recurrent moderate  Generalized anxiety disorder  Concerns for a mild cognitive impairment    P:   Increased duloxetine to 60mg bid for treatment of neuropathy.   Patient was cautioned regarding adverse effects of the medication as had been described to them previously.  Continue trazodone 100mg qhs    Medication 
14-Feb-2021

## 2024-04-08 ENCOUNTER — TELEMEDICINE (OUTPATIENT)
Dept: PSYCHIATRY | Age: 64
End: 2024-04-08
Payer: MEDICARE

## 2024-04-08 DIAGNOSIS — F41.1 GAD (GENERALIZED ANXIETY DISORDER): Primary | ICD-10-CM

## 2024-04-08 DIAGNOSIS — F33.1 MODERATE EPISODE OF RECURRENT MAJOR DEPRESSIVE DISORDER (HCC): ICD-10-CM

## 2024-04-08 DIAGNOSIS — E11.42 DIABETIC POLYNEUROPATHY ASSOCIATED WITH TYPE 2 DIABETES MELLITUS (HCC): ICD-10-CM

## 2024-04-08 PROCEDURE — G8427 DOCREV CUR MEDS BY ELIG CLIN: HCPCS | Performed by: STUDENT IN AN ORGANIZED HEALTH CARE EDUCATION/TRAINING PROGRAM

## 2024-04-08 PROCEDURE — 3017F COLORECTAL CA SCREEN DOC REV: CPT | Performed by: STUDENT IN AN ORGANIZED HEALTH CARE EDUCATION/TRAINING PROGRAM

## 2024-04-08 PROCEDURE — G8417 CALC BMI ABV UP PARAM F/U: HCPCS | Performed by: STUDENT IN AN ORGANIZED HEALTH CARE EDUCATION/TRAINING PROGRAM

## 2024-04-08 PROCEDURE — 1090F PRES/ABSN URINE INCON ASSESS: CPT | Performed by: STUDENT IN AN ORGANIZED HEALTH CARE EDUCATION/TRAINING PROGRAM

## 2024-04-08 PROCEDURE — 1036F TOBACCO NON-USER: CPT | Performed by: STUDENT IN AN ORGANIZED HEALTH CARE EDUCATION/TRAINING PROGRAM

## 2024-04-08 PROCEDURE — 2022F DILAT RTA XM EVC RTNOPTHY: CPT | Performed by: STUDENT IN AN ORGANIZED HEALTH CARE EDUCATION/TRAINING PROGRAM

## 2024-04-08 PROCEDURE — 3051F HG A1C>EQUAL 7.0%<8.0%: CPT | Performed by: STUDENT IN AN ORGANIZED HEALTH CARE EDUCATION/TRAINING PROGRAM

## 2024-04-08 PROCEDURE — 1123F ACP DISCUSS/DSCN MKR DOCD: CPT | Performed by: STUDENT IN AN ORGANIZED HEALTH CARE EDUCATION/TRAINING PROGRAM

## 2024-04-08 PROCEDURE — 99213 OFFICE O/P EST LOW 20 MIN: CPT | Performed by: STUDENT IN AN ORGANIZED HEALTH CARE EDUCATION/TRAINING PROGRAM

## 2024-04-08 PROCEDURE — G8400 PT W/DXA NO RESULTS DOC: HCPCS | Performed by: STUDENT IN AN ORGANIZED HEALTH CARE EDUCATION/TRAINING PROGRAM

## 2024-04-08 RX ORDER — TRAZODONE HYDROCHLORIDE 100 MG/1
100 TABLET ORAL NIGHTLY
Qty: 90 TABLET | Refills: 1 | Status: SHIPPED | OUTPATIENT
Start: 2024-04-08

## 2024-04-08 RX ORDER — DULOXETIN HYDROCHLORIDE 60 MG/1
60 CAPSULE, DELAYED RELEASE ORAL 2 TIMES DAILY
Qty: 180 CAPSULE | Refills: 1 | Status: SHIPPED | OUTPATIENT
Start: 2024-04-08

## 2024-04-08 RX ORDER — DULOXETIN HYDROCHLORIDE 60 MG/1
120 CAPSULE, DELAYED RELEASE ORAL DAILY
Qty: 180 CAPSULE | Refills: 1 | Status: SHIPPED | OUTPATIENT
Start: 2024-04-08 | End: 2024-04-08

## 2024-04-08 ASSESSMENT — PATIENT HEALTH QUESTIONNAIRE - PHQ9
10. IF YOU CHECKED OFF ANY PROBLEMS, HOW DIFFICULT HAVE THESE PROBLEMS MADE IT FOR YOU TO DO YOUR WORK, TAKE CARE OF THINGS AT HOME, OR GET ALONG WITH OTHER PEOPLE: SOMEWHAT DIFFICULT
SUM OF ALL RESPONSES TO PHQ QUESTIONS 1-9: 8
2. FEELING DOWN, DEPRESSED OR HOPELESS: SEVERAL DAYS
1. LITTLE INTEREST OR PLEASURE IN DOING THINGS: SEVERAL DAYS
4. FEELING TIRED OR HAVING LITTLE ENERGY: SEVERAL DAYS
SUM OF ALL RESPONSES TO PHQ9 QUESTIONS 1 & 2: 2
9. THOUGHTS THAT YOU WOULD BE BETTER OFF DEAD, OR OF HURTING YOURSELF: NOT AT ALL
5. POOR APPETITE OR OVEREATING: SEVERAL DAYS
7. TROUBLE CONCENTRATING ON THINGS, SUCH AS READING THE NEWSPAPER OR WATCHING TELEVISION: MORE THAN HALF THE DAYS
3. TROUBLE FALLING OR STAYING ASLEEP: SEVERAL DAYS
SUM OF ALL RESPONSES TO PHQ QUESTIONS 1-9: 8
SUM OF ALL RESPONSES TO PHQ QUESTIONS 1-9: 8
8. MOVING OR SPEAKING SO SLOWLY THAT OTHER PEOPLE COULD HAVE NOTICED. OR THE OPPOSITE, BEING SO FIGETY OR RESTLESS THAT YOU HAVE BEEN MOVING AROUND A LOT MORE THAN USUAL: NOT AT ALL
6. FEELING BAD ABOUT YOURSELF - OR THAT YOU ARE A FAILURE OR HAVE LET YOURSELF OR YOUR FAMILY DOWN: SEVERAL DAYS
SUM OF ALL RESPONSES TO PHQ QUESTIONS 1-9: 8

## 2024-04-08 ASSESSMENT — ANXIETY QUESTIONNAIRES
7. FEELING AFRAID AS IF SOMETHING AWFUL MIGHT HAPPEN: SEVERAL DAYS
IF YOU CHECKED OFF ANY PROBLEMS ON THIS QUESTIONNAIRE, HOW DIFFICULT HAVE THESE PROBLEMS MADE IT FOR YOU TO DO YOUR WORK, TAKE CARE OF THINGS AT HOME, OR GET ALONG WITH OTHER PEOPLE: SOMEWHAT DIFFICULT
4. TROUBLE RELAXING: SEVERAL DAYS
GAD7 TOTAL SCORE: 6
3. WORRYING TOO MUCH ABOUT DIFFERENT THINGS: SEVERAL DAYS
2. NOT BEING ABLE TO STOP OR CONTROL WORRYING: SEVERAL DAYS
5. BEING SO RESTLESS THAT IT IS HARD TO SIT STILL: NOT AT ALL
1. FEELING NERVOUS, ANXIOUS, OR ON EDGE: SEVERAL DAYS
6. BECOMING EASILY ANNOYED OR IRRITABLE: SEVERAL DAYS

## 2024-04-08 ASSESSMENT — ENCOUNTER SYMPTOMS
GASTROINTESTINAL NEGATIVE: 1
ALLERGIC/IMMUNOLOGIC NEGATIVE: 1
RESPIRATORY NEGATIVE: 1
EYES NEGATIVE: 1

## 2024-04-17 RX ORDER — LANOLIN ALCOHOL/MO/W.PET/CERES
400 CREAM (GRAM) TOPICAL 3 TIMES DAILY
Qty: 90 TABLET | Refills: 0 | Status: SHIPPED | OUTPATIENT
Start: 2024-04-17

## 2024-04-17 NOTE — TELEPHONE ENCOUNTER
Last appointment: 11/13/2023  Next appointment: Visit date not found  Last refill: 10/02/2023    Called patient to schedule a follow up visit, no answer so I left a voicemail for patient to call our office back with our phone number included

## 2024-05-01 PROBLEM — E11.40 POORLY CONTROLLED TYPE 2 DIABETES MELLITUS WITH NEUROPATHY (HCC): Status: ACTIVE | Noted: 2024-05-01

## 2024-05-01 PROBLEM — E53.8 B12 DEFICIENCY: Status: ACTIVE | Noted: 2024-05-01

## 2024-05-01 PROBLEM — E11.65 POORLY CONTROLLED TYPE 2 DIABETES MELLITUS WITH NEUROPATHY (HCC): Status: ACTIVE | Noted: 2024-05-01

## 2024-08-12 DIAGNOSIS — E78.49 OTHER HYPERLIPIDEMIA: ICD-10-CM

## 2024-08-12 DIAGNOSIS — J45.30 MILD PERSISTENT ASTHMA WITHOUT COMPLICATION: ICD-10-CM

## 2024-08-12 DIAGNOSIS — E03.9 ACQUIRED HYPOTHYROIDISM: ICD-10-CM

## 2024-08-12 DIAGNOSIS — E11.40 POORLY CONTROLLED TYPE 2 DIABETES MELLITUS WITH NEUROPATHY (HCC): ICD-10-CM

## 2024-08-12 DIAGNOSIS — E11.65 POORLY CONTROLLED TYPE 2 DIABETES MELLITUS WITH NEUROPATHY (HCC): ICD-10-CM

## 2024-08-12 DIAGNOSIS — E04.2 MULTIPLE THYROID NODULES: ICD-10-CM

## 2024-08-12 RX ORDER — LEVOTHYROXINE SODIUM 0.07 MG/1
75 TABLET ORAL DAILY
Qty: 90 TABLET | Refills: 0 | Status: SHIPPED | OUTPATIENT
Start: 2024-08-12

## 2024-08-12 RX ORDER — ATORVASTATIN CALCIUM 40 MG/1
40 TABLET, FILM COATED ORAL DAILY
Qty: 90 TABLET | Refills: 1 | Status: SHIPPED | OUTPATIENT
Start: 2024-08-12

## 2024-08-12 RX ORDER — MONTELUKAST SODIUM 10 MG/1
10 TABLET ORAL DAILY
Qty: 90 TABLET | Refills: 1 | Status: SHIPPED | OUTPATIENT
Start: 2024-08-12

## 2024-08-12 RX ORDER — ALBUTEROL SULFATE 90 UG/1
2 AEROSOL, METERED RESPIRATORY (INHALATION) EVERY 6 HOURS PRN
Qty: 18 G | Refills: 5 | Status: SHIPPED | OUTPATIENT
Start: 2024-08-12

## 2024-08-12 SDOH — ECONOMIC STABILITY: INCOME INSECURITY: HOW HARD IS IT FOR YOU TO PAY FOR THE VERY BASICS LIKE FOOD, HOUSING, MEDICAL CARE, AND HEATING?: SOMEWHAT HARD

## 2024-08-12 SDOH — ECONOMIC STABILITY: FOOD INSECURITY: WITHIN THE PAST 12 MONTHS, YOU WORRIED THAT YOUR FOOD WOULD RUN OUT BEFORE YOU GOT MONEY TO BUY MORE.: SOMETIMES TRUE

## 2024-08-12 SDOH — ECONOMIC STABILITY: FOOD INSECURITY: WITHIN THE PAST 12 MONTHS, THE FOOD YOU BOUGHT JUST DIDN'T LAST AND YOU DIDN'T HAVE MONEY TO GET MORE.: SOMETIMES TRUE

## 2024-08-12 SDOH — ECONOMIC STABILITY: TRANSPORTATION INSECURITY
IN THE PAST 12 MONTHS, HAS LACK OF TRANSPORTATION KEPT YOU FROM MEETINGS, WORK, OR FROM GETTING THINGS NEEDED FOR DAILY LIVING?: NO

## 2024-08-12 NOTE — TELEPHONE ENCOUNTER
Last appointment: 11/13/2023  Next appointment: 8/15/2024  Last refill:     Albuterol inhaler: 04/06/2022  Atorvastatin: 02/08/2024  Montelukast: 11/13/2023

## 2024-08-13 DIAGNOSIS — F33.1 MODERATE EPISODE OF RECURRENT MAJOR DEPRESSIVE DISORDER (HCC): ICD-10-CM

## 2024-08-14 RX ORDER — TRAZODONE HYDROCHLORIDE 100 MG/1
TABLET ORAL
Refills: 0 | OUTPATIENT
Start: 2024-08-14

## 2024-08-14 RX ORDER — METOPROLOL SUCCINATE 25 MG/1
25 TABLET, EXTENDED RELEASE ORAL DAILY
Qty: 90 TABLET | Refills: 0 | Status: SHIPPED | OUTPATIENT
Start: 2024-08-14 | End: 2024-11-12

## 2024-08-15 ENCOUNTER — OFFICE VISIT (OUTPATIENT)
Dept: INTERNAL MEDICINE CLINIC | Age: 64
End: 2024-08-15
Payer: COMMERCIAL

## 2024-08-15 VITALS
WEIGHT: 180.6 LBS | HEART RATE: 79 BPM | OXYGEN SATURATION: 98 % | SYSTOLIC BLOOD PRESSURE: 115 MMHG | BODY MASS INDEX: 29.16 KG/M2 | DIASTOLIC BLOOD PRESSURE: 65 MMHG

## 2024-08-15 DIAGNOSIS — E03.9 ACQUIRED HYPOTHYROIDISM: ICD-10-CM

## 2024-08-15 DIAGNOSIS — N95.1 SYMPTOMATIC MENOPAUSAL OR FEMALE CLIMACTERIC STATES: Primary | ICD-10-CM

## 2024-08-15 DIAGNOSIS — E11.40 POORLY CONTROLLED TYPE 2 DIABETES MELLITUS WITH NEUROPATHY (HCC): ICD-10-CM

## 2024-08-15 DIAGNOSIS — E04.2 MULTIPLE THYROID NODULES: ICD-10-CM

## 2024-08-15 DIAGNOSIS — G91.0 HYDROCEPHALUS, COMMUNICATING (HCC): ICD-10-CM

## 2024-08-15 DIAGNOSIS — F33.1 MODERATE EPISODE OF RECURRENT MAJOR DEPRESSIVE DISORDER (HCC): ICD-10-CM

## 2024-08-15 DIAGNOSIS — Z12.31 ENCOUNTER FOR SCREENING MAMMOGRAM FOR MALIGNANT NEOPLASM OF BREAST: ICD-10-CM

## 2024-08-15 DIAGNOSIS — C95.11 CHRONIC LEUKEMIA IN REMISSION (HCC): ICD-10-CM

## 2024-08-15 DIAGNOSIS — G89.29 CHRONIC PAIN OF RIGHT ANKLE: ICD-10-CM

## 2024-08-15 DIAGNOSIS — H93.19 TINNITUS, UNSPECIFIED LATERALITY: ICD-10-CM

## 2024-08-15 DIAGNOSIS — E11.65 POORLY CONTROLLED TYPE 2 DIABETES MELLITUS WITH NEUROPATHY (HCC): ICD-10-CM

## 2024-08-15 DIAGNOSIS — E21.3 HYPERPARATHYROIDISM (HCC): ICD-10-CM

## 2024-08-15 DIAGNOSIS — M25.571 CHRONIC PAIN OF RIGHT ANKLE: ICD-10-CM

## 2024-08-15 DIAGNOSIS — J45.30 MILD PERSISTENT ASTHMA WITHOUT COMPLICATION: ICD-10-CM

## 2024-08-15 DIAGNOSIS — K21.9 GASTROESOPHAGEAL REFLUX DISEASE WITHOUT ESOPHAGITIS: ICD-10-CM

## 2024-08-15 DIAGNOSIS — E78.49 OTHER HYPERLIPIDEMIA: ICD-10-CM

## 2024-08-15 PROCEDURE — 99215 OFFICE O/P EST HI 40 MIN: CPT | Performed by: INTERNAL MEDICINE

## 2024-08-15 PROCEDURE — 3078F DIAST BP <80 MM HG: CPT | Performed by: INTERNAL MEDICINE

## 2024-08-15 PROCEDURE — 3074F SYST BP LT 130 MM HG: CPT | Performed by: INTERNAL MEDICINE

## 2024-08-15 PROCEDURE — 1123F ACP DISCUSS/DSCN MKR DOCD: CPT | Performed by: INTERNAL MEDICINE

## 2024-08-15 PROCEDURE — 3051F HG A1C>EQUAL 7.0%<8.0%: CPT | Performed by: INTERNAL MEDICINE

## 2024-08-15 RX ORDER — INSULIN GLARGINE 100 [IU]/ML
INJECTION, SOLUTION SUBCUTANEOUS
Qty: 45 ML | Refills: 1 | Status: SHIPPED | OUTPATIENT
Start: 2024-08-15

## 2024-08-15 RX ORDER — PANTOPRAZOLE SODIUM 40 MG/1
40 TABLET, DELAYED RELEASE ORAL DAILY
Qty: 90 TABLET | Refills: 1 | Status: SHIPPED | OUTPATIENT
Start: 2024-08-15

## 2024-08-15 RX ORDER — TRAZODONE HYDROCHLORIDE 100 MG/1
100 TABLET ORAL NIGHTLY
Qty: 90 TABLET | Refills: 0 | Status: SHIPPED | OUTPATIENT
Start: 2024-08-15 | End: 2024-11-13

## 2024-08-15 SDOH — ECONOMIC STABILITY: FOOD INSECURITY: WITHIN THE PAST 12 MONTHS, YOU WORRIED THAT YOUR FOOD WOULD RUN OUT BEFORE YOU GOT MONEY TO BUY MORE.: NEVER TRUE

## 2024-08-15 SDOH — ECONOMIC STABILITY: INCOME INSECURITY: HOW HARD IS IT FOR YOU TO PAY FOR THE VERY BASICS LIKE FOOD, HOUSING, MEDICAL CARE, AND HEATING?: SOMEWHAT HARD

## 2024-08-15 SDOH — ECONOMIC STABILITY: FOOD INSECURITY: WITHIN THE PAST 12 MONTHS, THE FOOD YOU BOUGHT JUST DIDN'T LAST AND YOU DIDN'T HAVE MONEY TO GET MORE.: NEVER TRUE

## 2024-08-15 ASSESSMENT — ANXIETY QUESTIONNAIRES
7. FEELING AFRAID AS IF SOMETHING AWFUL MIGHT HAPPEN: SEVERAL DAYS
5. BEING SO RESTLESS THAT IT IS HARD TO SIT STILL: NOT AT ALL
1. FEELING NERVOUS, ANXIOUS, OR ON EDGE: SEVERAL DAYS
GAD7 TOTAL SCORE: 6
IF YOU CHECKED OFF ANY PROBLEMS ON THIS QUESTIONNAIRE, HOW DIFFICULT HAVE THESE PROBLEMS MADE IT FOR YOU TO DO YOUR WORK, TAKE CARE OF THINGS AT HOME, OR GET ALONG WITH OTHER PEOPLE: SOMEWHAT DIFFICULT
6. BECOMING EASILY ANNOYED OR IRRITABLE: SEVERAL DAYS
4. TROUBLE RELAXING: SEVERAL DAYS
3. WORRYING TOO MUCH ABOUT DIFFERENT THINGS: SEVERAL DAYS
2. NOT BEING ABLE TO STOP OR CONTROL WORRYING: SEVERAL DAYS

## 2024-08-15 ASSESSMENT — PATIENT HEALTH QUESTIONNAIRE - PHQ9
SUM OF ALL RESPONSES TO PHQ QUESTIONS 1-9: 11
1. LITTLE INTEREST OR PLEASURE IN DOING THINGS: SEVERAL DAYS
3. TROUBLE FALLING OR STAYING ASLEEP: NEARLY EVERY DAY
SUM OF ALL RESPONSES TO PHQ QUESTIONS 1-9: 11
2. FEELING DOWN, DEPRESSED OR HOPELESS: SEVERAL DAYS
7. TROUBLE CONCENTRATING ON THINGS, SUCH AS READING THE NEWSPAPER OR WATCHING TELEVISION: NOT AT ALL
6. FEELING BAD ABOUT YOURSELF - OR THAT YOU ARE A FAILURE OR HAVE LET YOURSELF OR YOUR FAMILY DOWN: NOT AT ALL
8. MOVING OR SPEAKING SO SLOWLY THAT OTHER PEOPLE COULD HAVE NOTICED. OR THE OPPOSITE, BEING SO FIGETY OR RESTLESS THAT YOU HAVE BEEN MOVING AROUND A LOT MORE THAN USUAL: SEVERAL DAYS
SUM OF ALL RESPONSES TO PHQ QUESTIONS 1-9: 11
SUM OF ALL RESPONSES TO PHQ QUESTIONS 1-9: 11
SUM OF ALL RESPONSES TO PHQ9 QUESTIONS 1 & 2: 2
9. THOUGHTS THAT YOU WOULD BE BETTER OFF DEAD, OR OF HURTING YOURSELF: NOT AT ALL
4. FEELING TIRED OR HAVING LITTLE ENERGY: NEARLY EVERY DAY
10. IF YOU CHECKED OFF ANY PROBLEMS, HOW DIFFICULT HAVE THESE PROBLEMS MADE IT FOR YOU TO DO YOUR WORK, TAKE CARE OF THINGS AT HOME, OR GET ALONG WITH OTHER PEOPLE: NOT DIFFICULT AT ALL
5. POOR APPETITE OR OVEREATING: MORE THAN HALF THE DAYS

## 2024-08-15 NOTE — PATIENT INSTRUCTIONS
Try find new psychiatrist- see packet    Covid at pharmacy  Flu here or pharmacy    Dexa and mammogram  [id number]    Labs fasting    Eye exam    Ankle xray  [id number]

## 2024-08-15 NOTE — PROGRESS NOTES
Malaika Arbams (:  1960) is a 64 y.o. female, here for evaluation of the following chief complaint(s):    Medication Refill      ASSESSMENT/PLAN:  1. Symptomatic menopausal or female climacteric states  -     DEXA BONE DENSITY AXIAL SKELETON; Future  2. Hyperparathyroidism (HCC)  As per endocrinology  3. Hydrocephalus, communicating (HCC)  Stable findings on  head CT.  Saw neurology but lost to follow-up  4. Chronic leukemia in remission (HCC)  Established with Dr lomeli  5. Mild persistent asthma without complication  stable on Albuterol and zyrtec and  montelukast  6. Gastroesophageal reflux disease without esophagitis  -     Stable on pantoprazole (PROTONIX) 40 MG tablet; Take 1 tablet by mouth daily, Disp-90 tablet, R-1Normal  7. Tinnitus, unspecified laterality  -     Vitamin B12 & Folate; Future  8. Acquired hypothyroidism  Stable on Levothyroxine.   9. Other hyperlipidemia  -    stable on atorvastatin  10. Multiple thyroid nodules  overdue for repeat thyroid ultrasound   11. 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  Remains on insulin and jardiance.  -     insulin glargine (LANTUS SOLOSTAR) 100 UNIT/ML injection pen; INJECT 30 UNITS UNDER THE SKIN NIGHTLY, Disp-45 mL, R-1Normal  12. Moderate episode of recurrent major depressive disorder (HCC)  -     Insomnia well-controlled on trazodone.  Remains on duloxetine.  Packet given to find a new psychiatrist.  13. Chronic pain of right ankle  -     XR ANKLE RIGHT (MIN 3 VIEWS); Future  14. Encounter for screening mammogram for malignant neoplasm of breast  -     SARITA KRYSTIAN DIGITAL SCREEN BILATERAL; Future    HO Right sided numbness  Stable on aspirin    Chronic ischemic heart disease  -     Saw Dr Sanchez who adjusted meds - on metoprolol, asa/statin  CORONA (nonalcoholic steatohepatitis)  Had u/s of liver with GI  Diabetic polyneuropathy associated with type 2 diabetes  40 MG tablet Take 1 tablet by mouth daily 90 tablet 1    insulin glargine (LANTUS SOLOSTAR) 100 UNIT/ML injection pen INJECT 30 UNITS UNDER THE SKIN NIGHTLY 45 mL 1    traZODone (DESYREL) 100 MG tablet Take 1 tablet by mouth nightly 90 tablet 0    metoprolol succinate (TOPROL XL) 25 MG extended release tablet Take 1 tablet by mouth daily 90 tablet 0    DULoxetine (CYMBALTA) 60 MG extended release capsule Take 1 capsule by mouth 2 times daily 60 capsule 0    levothyroxine (SYNTHROID) 75 MCG tablet Take 1 tablet by mouth Daily 90 tablet 0    empagliflozin (JARDIANCE) 25 MG tablet Take 1 tablet by mouth daily 90 tablet 0    albuterol sulfate HFA (PROVENTIL;VENTOLIN;PROAIR) 108 (90 Base) MCG/ACT inhaler Inhale 2 puffs into the lungs every 6 hours as needed for Wheezing 18 g 5    montelukast (SINGULAIR) 10 MG tablet Take 1 tablet by mouth daily 90 tablet 1    atorvastatin (LIPITOR) 40 MG tablet Take 1 tablet by mouth daily 90 tablet 1    magnesium oxide (MAG-OX) 400 (240 Mg) MG tablet TAKE ONE TABLET BY MOUTH THREE TIMES A DAY 90 tablet 0    insulin lispro, 1 Unit Dial, (HUMALOG KWIKPEN) 100 UNIT/ML SOPN Up to 30 units before meals 3 times daily, total daily dose up to 90 units 135 mL 1    b complex vitamins capsule Take 1 capsule by mouth daily      aspirin 81 MG EC tablet Take 1 tablet by mouth daily      cetirizine (ZYRTEC ALLERGY) 10 MG tablet Take 1 tablet by mouth daily      Continuous Blood Gluc Sensor (FREESTYLE RAND 2 SENSOR) MISC CHANGE SENSOR EVERY 14 DAYS 6 each 3    Insulin Pen Needle 32G X 5 MM MISC 1 each by Other route 5 times daily 100 each 11    vitamin D 25 MCG (1000 UT) CAPS       Glucos-Chondroit-Hyaluron-MSM (GLUCOSAMINE CHONDROITIN JOINT) TABS Take by mouth      turmeric 500 MG CAPS Take by mouth daily      Blood Glucose Monitoring Suppl (ONE TOUCH ULTRA 2) w/Device KIT 1 kit by Does not apply route daily 1 kit 0    Lancets MISC 1 each by Does not apply route 2 times daily 100 each 5    blood

## 2024-08-19 ENCOUNTER — HOSPITAL ENCOUNTER (OUTPATIENT)
Dept: GENERAL RADIOLOGY | Age: 64
Discharge: HOME | End: 2024-08-19
Payer: COMMERCIAL

## 2024-08-19 DIAGNOSIS — M25.571 CHRONIC PAIN OF RIGHT ANKLE: ICD-10-CM

## 2024-08-19 DIAGNOSIS — G89.29 CHRONIC PAIN OF RIGHT ANKLE: ICD-10-CM

## 2024-08-19 LAB
ALBUMIN: 3.9 G/DL
ALP BLD-CCNC: 97 U/L
ALT SERPL-CCNC: 34 U/L
ANION GAP SERPL CALCULATED.3IONS-SCNC: 1.3 MMOL/L
AST SERPL-CCNC: 25 U/L
BASOPHILS ABSOLUTE: 0.02 /ΜL
BASOPHILS RELATIVE PERCENT: 0.6 %
BILIRUB SERPL-MCNC: 0.7 MG/DL (ref 0.1–1.4)
BUN BLDV-MCNC: 17 MG/DL
CALCIUM SERPL-MCNC: 9.6 MG/DL
CHLORIDE BLD-SCNC: 106 MMOL/L
CO2: 25.2 MMOL/L
CREAT SERPL-MCNC: 1.17 MG/DL
EOSINOPHILS ABSOLUTE: 0.11 /ΜL
EOSINOPHILS RELATIVE PERCENT: 3.5 %
GFR, ESTIMATED: 51.8
GLUCOSE BLD-MCNC: 237 MG/DL
HCT VFR BLD CALC: 39.6 % (ref 36–46)
HEMOGLOBIN: 12.5 G/DL (ref 12–16)
IRON: 66
LYMPHOCYTES ABSOLUTE: 0.92 /ΜL
LYMPHOCYTES RELATIVE PERCENT: 29.7 %
MCH RBC QN AUTO: 25.9 PG
MCHC RBC AUTO-ENTMCNC: 31.6 G/DL
MCV RBC AUTO: 82.2 FL
MONOCYTES ABSOLUTE: 0.29 /ΜL
MONOCYTES RELATIVE PERCENT: 9.4 %
NEUTROPHILS ABSOLUTE: 1.76 /ΜL
NEUTROPHILS RELATIVE PERCENT: 56.8 %
PLATELET # BLD: 205 K/ΜL
PMV BLD AUTO: ABNORMAL FL
POTASSIUM SERPL-SCNC: 4.2 MMOL/L
RBC # BLD: 4.82 10^6/ΜL
SODIUM BLD-SCNC: 139 MMOL/L
TOTAL IRON BINDING CAPACITY: 101
TOTAL PROTEIN: 6.8 G/DL (ref 6.4–8.2)
VITAMIN B-12: 549
WBC # BLD: 3.1 10^3/ML

## 2024-08-19 PROCEDURE — 73610 X-RAY EXAM OF ANKLE: CPT

## 2024-08-23 ENCOUNTER — PATIENT MESSAGE (OUTPATIENT)
Dept: INTERNAL MEDICINE CLINIC | Age: 64
End: 2024-08-23

## 2024-09-15 SDOH — HEALTH STABILITY: PHYSICAL HEALTH: ON AVERAGE, HOW MANY MINUTES DO YOU ENGAGE IN EXERCISE AT THIS LEVEL?: 120 MIN

## 2024-09-15 SDOH — HEALTH STABILITY: PHYSICAL HEALTH: ON AVERAGE, HOW MANY DAYS PER WEEK DO YOU ENGAGE IN MODERATE TO STRENUOUS EXERCISE (LIKE A BRISK WALK)?: 1 DAY

## 2024-09-15 ASSESSMENT — PATIENT HEALTH QUESTIONNAIRE - PHQ9
2. FEELING DOWN, DEPRESSED OR HOPELESS: SEVERAL DAYS
SUM OF ALL RESPONSES TO PHQ QUESTIONS 1-9: 2
SUM OF ALL RESPONSES TO PHQ QUESTIONS 1-9: 2
1. LITTLE INTEREST OR PLEASURE IN DOING THINGS: SEVERAL DAYS
SUM OF ALL RESPONSES TO PHQ QUESTIONS 1-9: 2
SUM OF ALL RESPONSES TO PHQ9 QUESTIONS 1 & 2: 2
SUM OF ALL RESPONSES TO PHQ QUESTIONS 1-9: 2

## 2024-09-15 ASSESSMENT — LIFESTYLE VARIABLES
HOW MANY STANDARD DRINKS CONTAINING ALCOHOL DO YOU HAVE ON A TYPICAL DAY: PATIENT DOES NOT DRINK
HOW OFTEN DO YOU HAVE A DRINK CONTAINING ALCOHOL: 1
HOW OFTEN DO YOU HAVE A DRINK CONTAINING ALCOHOL: NEVER
HOW MANY STANDARD DRINKS CONTAINING ALCOHOL DO YOU HAVE ON A TYPICAL DAY: 0
HOW OFTEN DO YOU HAVE SIX OR MORE DRINKS ON ONE OCCASION: 1

## 2024-09-16 ENCOUNTER — OFFICE VISIT (OUTPATIENT)
Dept: INTERNAL MEDICINE CLINIC | Age: 64
End: 2024-09-16
Payer: COMMERCIAL

## 2024-09-16 VITALS
SYSTOLIC BLOOD PRESSURE: 116 MMHG | DIASTOLIC BLOOD PRESSURE: 68 MMHG | OXYGEN SATURATION: 96 % | HEIGHT: 66 IN | HEART RATE: 76 BPM | BODY MASS INDEX: 28.61 KG/M2 | WEIGHT: 178 LBS

## 2024-09-16 DIAGNOSIS — G89.29 CHRONIC RIGHT SHOULDER PAIN: ICD-10-CM

## 2024-09-16 DIAGNOSIS — R26.89 BALANCE DISORDER: ICD-10-CM

## 2024-09-16 DIAGNOSIS — R41.3 MEMORY LOSS: ICD-10-CM

## 2024-09-16 DIAGNOSIS — M25.511 CHRONIC RIGHT SHOULDER PAIN: ICD-10-CM

## 2024-09-16 DIAGNOSIS — Z00.00 INITIAL MEDICARE ANNUAL WELLNESS VISIT: Primary | ICD-10-CM

## 2024-09-16 PROCEDURE — 3078F DIAST BP <80 MM HG: CPT | Performed by: INTERNAL MEDICINE

## 2024-09-16 PROCEDURE — 1123F ACP DISCUSS/DSCN MKR DOCD: CPT | Performed by: INTERNAL MEDICINE

## 2024-09-16 PROCEDURE — G0438 PPPS, INITIAL VISIT: HCPCS | Performed by: INTERNAL MEDICINE

## 2024-09-16 PROCEDURE — 99212 OFFICE O/P EST SF 10 MIN: CPT | Performed by: INTERNAL MEDICINE

## 2024-09-16 PROCEDURE — 90653 IIV ADJUVANT VACCINE IM: CPT | Performed by: INTERNAL MEDICINE

## 2024-09-16 PROCEDURE — 3074F SYST BP LT 130 MM HG: CPT | Performed by: INTERNAL MEDICINE

## 2024-09-16 PROCEDURE — 90471 IMMUNIZATION ADMIN: CPT | Performed by: INTERNAL MEDICINE

## 2024-09-16 ASSESSMENT — PATIENT HEALTH QUESTIONNAIRE - PHQ9
SUM OF ALL RESPONSES TO PHQ QUESTIONS 1-9: 8
SUM OF ALL RESPONSES TO PHQ9 QUESTIONS 1 & 2: 2
3. TROUBLE FALLING OR STAYING ASLEEP: SEVERAL DAYS
8. MOVING OR SPEAKING SO SLOWLY THAT OTHER PEOPLE COULD HAVE NOTICED. OR THE OPPOSITE, BEING SO FIGETY OR RESTLESS THAT YOU HAVE BEEN MOVING AROUND A LOT MORE THAN USUAL: SEVERAL DAYS
1. LITTLE INTEREST OR PLEASURE IN DOING THINGS: SEVERAL DAYS
SUM OF ALL RESPONSES TO PHQ QUESTIONS 1-9: 8
4. FEELING TIRED OR HAVING LITTLE ENERGY: NEARLY EVERY DAY
2. FEELING DOWN, DEPRESSED OR HOPELESS: SEVERAL DAYS
9. THOUGHTS THAT YOU WOULD BE BETTER OFF DEAD, OR OF HURTING YOURSELF: NOT AT ALL
SUM OF ALL RESPONSES TO PHQ QUESTIONS 1-9: 8
SUM OF ALL RESPONSES TO PHQ QUESTIONS 1-9: 8
5. POOR APPETITE OR OVEREATING: NOT AT ALL
6. FEELING BAD ABOUT YOURSELF - OR THAT YOU ARE A FAILURE OR HAVE LET YOURSELF OR YOUR FAMILY DOWN: NOT AT ALL
10. IF YOU CHECKED OFF ANY PROBLEMS, HOW DIFFICULT HAVE THESE PROBLEMS MADE IT FOR YOU TO DO YOUR WORK, TAKE CARE OF THINGS AT HOME, OR GET ALONG WITH OTHER PEOPLE: SOMEWHAT DIFFICULT
7. TROUBLE CONCENTRATING ON THINGS, SUCH AS READING THE NEWSPAPER OR WATCHING TELEVISION: SEVERAL DAYS

## 2024-09-18 ENCOUNTER — OFFICE VISIT (OUTPATIENT)
Dept: ORTHOPEDIC SURGERY | Age: 64
End: 2024-09-18

## 2024-09-18 VITALS — WEIGHT: 178 LBS | HEIGHT: 66 IN | BODY MASS INDEX: 28.61 KG/M2

## 2024-09-18 DIAGNOSIS — M25.811 TIGHT POSTERIOR CAPSULE OF SHOULDER, RIGHT: Primary | ICD-10-CM

## 2024-09-18 DIAGNOSIS — E78.49 OTHER HYPERLIPIDEMIA: ICD-10-CM

## 2024-09-18 DIAGNOSIS — K21.9 GASTROESOPHAGEAL REFLUX DISEASE WITHOUT ESOPHAGITIS: ICD-10-CM

## 2024-09-18 DIAGNOSIS — E03.9 ACQUIRED HYPOTHYROIDISM: ICD-10-CM

## 2024-09-18 DIAGNOSIS — M75.21 BICEPS TENDONITIS ON RIGHT: ICD-10-CM

## 2024-09-18 DIAGNOSIS — E04.2 MULTIPLE THYROID NODULES: ICD-10-CM

## 2024-09-18 DIAGNOSIS — E11.40 POORLY CONTROLLED TYPE 2 DIABETES MELLITUS WITH NEUROPATHY (HCC): ICD-10-CM

## 2024-09-18 DIAGNOSIS — E11.65 POORLY CONTROLLED TYPE 2 DIABETES MELLITUS WITH NEUROPATHY (HCC): ICD-10-CM

## 2024-09-18 DIAGNOSIS — M25.511 RIGHT SHOULDER PAIN, UNSPECIFIED CHRONICITY: ICD-10-CM

## 2024-09-18 DIAGNOSIS — M75.81 ROTATOR CUFF TENDONITIS, RIGHT: ICD-10-CM

## 2024-09-18 DIAGNOSIS — H93.19 TINNITUS, UNSPECIFIED LATERALITY: ICD-10-CM

## 2024-09-18 LAB
ALBUMIN SERPL-MCNC: 4.2 G/DL (ref 3.4–5)
ALBUMIN/GLOB SERPL: 2 {RATIO} (ref 1.1–2.2)
ALP SERPL-CCNC: 105 U/L (ref 40–129)
ALT SERPL-CCNC: 31 U/L (ref 10–40)
ANION GAP SERPL CALCULATED.3IONS-SCNC: 11 MMOL/L (ref 3–16)
AST SERPL-CCNC: 26 U/L (ref 15–37)
BILIRUB SERPL-MCNC: 0.4 MG/DL (ref 0–1)
BUN SERPL-MCNC: 16 MG/DL (ref 7–20)
CALCIUM SERPL-MCNC: 9.4 MG/DL (ref 8.3–10.6)
CHLORIDE SERPL-SCNC: 103 MMOL/L (ref 99–110)
CHOLEST SERPL-MCNC: 169 MG/DL (ref 0–199)
CO2 SERPL-SCNC: 25 MMOL/L (ref 21–32)
CREAT SERPL-MCNC: 1.2 MG/DL (ref 0.6–1.2)
CREAT UR-MCNC: 102 MG/DL (ref 28–259)
EST. AVERAGE GLUCOSE BLD GHB EST-MCNC: 180 MG/DL
FOLATE SERPL-MCNC: 18.7 NG/ML (ref 4.78–24.2)
GFR SERPLBLD CREATININE-BSD FMLA CKD-EPI: 50 ML/MIN/{1.73_M2}
GLUCOSE SERPL-MCNC: 230 MG/DL (ref 70–99)
HBA1C MFR BLD: 7.9 %
HDLC SERPL-MCNC: 63 MG/DL (ref 40–60)
LDL CHOLESTEROL: 79 MG/DL
MAGNESIUM SERPL-MCNC: 1.64 MG/DL (ref 1.8–2.4)
MICROALBUMIN UR DL<=1MG/L-MCNC: <1.2 MG/DL
MICROALBUMIN/CREAT UR: NORMAL MG/G (ref 0–30)
POTASSIUM SERPL-SCNC: 4.6 MMOL/L (ref 3.5–5.1)
PROT SERPL-MCNC: 6.3 G/DL (ref 6.4–8.2)
SODIUM SERPL-SCNC: 139 MMOL/L (ref 136–145)
T4 FREE SERPL-MCNC: 1.3 NG/DL (ref 0.9–1.8)
TRIGL SERPL-MCNC: 136 MG/DL (ref 0–150)
TSH SERPL DL<=0.005 MIU/L-ACNC: 2.91 UIU/ML (ref 0.27–4.2)
VIT B12 SERPL-MCNC: 621 PG/ML (ref 211–911)
VLDLC SERPL CALC-MCNC: 27 MG/DL

## 2024-09-18 RX ORDER — LIDOCAINE HYDROCHLORIDE 10 MG/ML
8 INJECTION, SOLUTION INFILTRATION; PERINEURAL ONCE
Status: COMPLETED | OUTPATIENT
Start: 2024-09-18 | End: 2024-09-18

## 2024-09-18 RX ORDER — METHYLPREDNISOLONE ACETATE 40 MG/ML
80 INJECTION, SUSPENSION INTRA-ARTICULAR; INTRALESIONAL; INTRAMUSCULAR; SOFT TISSUE ONCE
Status: COMPLETED | OUTPATIENT
Start: 2024-09-18 | End: 2024-09-18

## 2024-09-18 RX ADMIN — LIDOCAINE HYDROCHLORIDE 8 ML: 10 INJECTION, SOLUTION INFILTRATION; PERINEURAL at 12:08

## 2024-09-18 RX ADMIN — METHYLPREDNISOLONE ACETATE 80 MG: 40 INJECTION, SUSPENSION INTRA-ARTICULAR; INTRALESIONAL; INTRAMUSCULAR; SOFT TISSUE at 12:10

## 2024-09-24 ENCOUNTER — TELEPHONE (OUTPATIENT)
Dept: PHYSICAL THERAPY | Age: 64
End: 2024-09-24

## 2024-11-18 DIAGNOSIS — E11.65 POORLY CONTROLLED TYPE 2 DIABETES MELLITUS WITH NEUROPATHY (HCC): ICD-10-CM

## 2024-11-18 DIAGNOSIS — E03.9 ACQUIRED HYPOTHYROIDISM: ICD-10-CM

## 2024-11-18 DIAGNOSIS — E04.2 MULTIPLE THYROID NODULES: ICD-10-CM

## 2024-11-18 DIAGNOSIS — E78.49 OTHER HYPERLIPIDEMIA: ICD-10-CM

## 2024-11-18 DIAGNOSIS — F33.1 MODERATE EPISODE OF RECURRENT MAJOR DEPRESSIVE DISORDER (HCC): ICD-10-CM

## 2024-11-18 DIAGNOSIS — E11.40 POORLY CONTROLLED TYPE 2 DIABETES MELLITUS WITH NEUROPATHY (HCC): ICD-10-CM

## 2024-11-19 RX ORDER — LEVOTHYROXINE SODIUM 75 UG/1
75 TABLET ORAL DAILY
Qty: 90 TABLET | Refills: 3 | OUTPATIENT
Start: 2024-11-19

## 2024-11-19 RX ORDER — TRAZODONE HYDROCHLORIDE 100 MG/1
100 TABLET ORAL NIGHTLY
Qty: 90 TABLET | Refills: 1 | Status: SHIPPED | OUTPATIENT
Start: 2024-11-19

## 2024-11-19 RX ORDER — EMPAGLIFLOZIN 25 MG/1
25 TABLET, FILM COATED ORAL DAILY
Qty: 90 TABLET | Refills: 3 | OUTPATIENT
Start: 2024-11-19

## 2024-11-19 NOTE — TELEPHONE ENCOUNTER
Last appointment: 9/16/2024  Next appointment: 1/16/2025  Last refill: 8/15/24  Requested Prescriptions     Pending Prescriptions Disp Refills    traZODone (DESYREL) 100 MG tablet [Pharmacy Med Name: TRAZODONE HCL TABS 100MG] 90 tablet 3     Sig: TAKE 1 TABLET NIGHTLY

## 2024-11-21 DIAGNOSIS — E03.9 ACQUIRED HYPOTHYROIDISM: ICD-10-CM

## 2024-11-21 DIAGNOSIS — E78.49 OTHER HYPERLIPIDEMIA: ICD-10-CM

## 2024-11-21 DIAGNOSIS — E11.65 POORLY CONTROLLED TYPE 2 DIABETES MELLITUS WITH NEUROPATHY (HCC): ICD-10-CM

## 2024-11-21 DIAGNOSIS — E04.2 MULTIPLE THYROID NODULES: ICD-10-CM

## 2024-11-21 DIAGNOSIS — E11.40 POORLY CONTROLLED TYPE 2 DIABETES MELLITUS WITH NEUROPATHY (HCC): ICD-10-CM

## 2024-11-21 RX ORDER — LEVOTHYROXINE SODIUM 75 UG/1
75 TABLET ORAL DAILY
Qty: 90 TABLET | Refills: 0 | Status: SHIPPED | OUTPATIENT
Start: 2024-11-21

## 2024-11-21 NOTE — TELEPHONE ENCOUNTER
Patient is needing a medication refill from  for:    levothyroxine (SYNTHROID) 75 MCG tablet   Last appointment: 9/16/2024  Next appointment: 1/16/2025  Last refill: 8/12/24      Please send medication refill to :    [facility] - [address] - P [phone] - F [phone] [address] [place] [id number]  Phone: [phone]  Fax: [phone]

## 2024-12-01 DIAGNOSIS — E03.9 ACQUIRED HYPOTHYROIDISM: ICD-10-CM

## 2024-12-01 DIAGNOSIS — E78.49 OTHER HYPERLIPIDEMIA: ICD-10-CM

## 2024-12-01 DIAGNOSIS — E11.40 POORLY CONTROLLED TYPE 2 DIABETES MELLITUS WITH NEUROPATHY (HCC): ICD-10-CM

## 2024-12-01 DIAGNOSIS — E11.42 DIABETIC POLYNEUROPATHY ASSOCIATED WITH TYPE 2 DIABETES MELLITUS (HCC): ICD-10-CM

## 2024-12-01 DIAGNOSIS — E04.2 MULTIPLE THYROID NODULES: ICD-10-CM

## 2024-12-01 DIAGNOSIS — E11.65 POORLY CONTROLLED TYPE 2 DIABETES MELLITUS WITH NEUROPATHY (HCC): ICD-10-CM

## 2024-12-01 DIAGNOSIS — F33.1 MODERATE EPISODE OF RECURRENT MAJOR DEPRESSIVE DISORDER (HCC): ICD-10-CM

## 2024-12-02 RX ORDER — DULOXETIN HYDROCHLORIDE 60 MG/1
60 CAPSULE, DELAYED RELEASE ORAL 2 TIMES DAILY
Qty: 60 CAPSULE | Refills: 0 | Status: SHIPPED | OUTPATIENT
Start: 2024-12-02

## 2024-12-02 NOTE — TELEPHONE ENCOUNTER
Last appointment: 9/16/2024  Next appointment: 1/16/2025  Last refill: 8/13/24  Requested Prescriptions     Pending Prescriptions Disp Refills    DULoxetine (CYMBALTA) 60 MG extended release capsule 60 capsule 0     Sig: Take 1 capsule by mouth 2 times daily

## 2024-12-03 ENCOUNTER — PATIENT MESSAGE (OUTPATIENT)
Dept: INTERNAL MEDICINE CLINIC | Age: 64
End: 2024-12-03

## 2024-12-03 DIAGNOSIS — E78.49 OTHER HYPERLIPIDEMIA: ICD-10-CM

## 2024-12-03 DIAGNOSIS — E11.40 POORLY CONTROLLED TYPE 2 DIABETES MELLITUS WITH NEUROPATHY (HCC): ICD-10-CM

## 2024-12-03 DIAGNOSIS — E03.9 ACQUIRED HYPOTHYROIDISM: ICD-10-CM

## 2024-12-03 DIAGNOSIS — E04.2 MULTIPLE THYROID NODULES: ICD-10-CM

## 2024-12-03 DIAGNOSIS — E11.65 POORLY CONTROLLED TYPE 2 DIABETES MELLITUS WITH NEUROPATHY (HCC): ICD-10-CM

## 2024-12-03 NOTE — TELEPHONE ENCOUNTER
Last appointment: 9/16/2024  Next appointment: 1/16/2025  Last refill: 8/12/24  per another provider Dr Cathi Awan   The last time you filled it ws 5/25/23    Requested Prescriptions     Pending Prescriptions Disp Refills    empagliflozin (JARDIANCE) 25 MG tablet 90 tablet 0     Sig: Take 1 tablet by mouth daily

## 2024-12-24 DIAGNOSIS — E03.9 ACQUIRED HYPOTHYROIDISM: ICD-10-CM

## 2024-12-24 DIAGNOSIS — E11.65 POORLY CONTROLLED TYPE 2 DIABETES MELLITUS WITH NEUROPATHY (HCC): ICD-10-CM

## 2024-12-24 DIAGNOSIS — E11.40 POORLY CONTROLLED TYPE 2 DIABETES MELLITUS WITH NEUROPATHY (HCC): ICD-10-CM

## 2024-12-24 DIAGNOSIS — E04.2 MULTIPLE THYROID NODULES: ICD-10-CM

## 2024-12-24 DIAGNOSIS — E78.49 OTHER HYPERLIPIDEMIA: ICD-10-CM

## (undated) DEVICE — FORCEPS BX L240CM JAW DIA2.8MM L CAP W/ NDL MIC MESH TOOTH